# Patient Record
Sex: MALE | Race: OTHER | Employment: UNEMPLOYED | URBAN - METROPOLITAN AREA
[De-identification: names, ages, dates, MRNs, and addresses within clinical notes are randomized per-mention and may not be internally consistent; named-entity substitution may affect disease eponyms.]

---

## 2018-04-11 ENCOUNTER — OFFICE VISIT (OUTPATIENT)
Dept: PRIMARY CARE CLINIC | Age: 58
End: 2018-04-11
Payer: COMMERCIAL

## 2018-04-11 VITALS
DIASTOLIC BLOOD PRESSURE: 80 MMHG | OXYGEN SATURATION: 95 % | WEIGHT: 268 LBS | SYSTOLIC BLOOD PRESSURE: 130 MMHG | HEIGHT: 70 IN | RESPIRATION RATE: 16 BRPM | TEMPERATURE: 98 F | BODY MASS INDEX: 38.37 KG/M2 | HEART RATE: 77 BPM

## 2018-04-11 DIAGNOSIS — E78.5 DYSLIPIDEMIA: ICD-10-CM

## 2018-04-11 DIAGNOSIS — Z12.11 COLON CANCER SCREENING: ICD-10-CM

## 2018-04-11 DIAGNOSIS — G47.33 OSA (OBSTRUCTIVE SLEEP APNEA): ICD-10-CM

## 2018-04-11 DIAGNOSIS — K21.9 GASTROESOPHAGEAL REFLUX DISEASE WITHOUT ESOPHAGITIS: ICD-10-CM

## 2018-04-11 DIAGNOSIS — E03.9 HYPOTHYROIDISM, UNSPECIFIED TYPE: ICD-10-CM

## 2018-04-11 DIAGNOSIS — E66.09 EXOGENOUS OBESITY: ICD-10-CM

## 2018-04-11 DIAGNOSIS — F41.1 GAD (GENERALIZED ANXIETY DISORDER): Primary | ICD-10-CM

## 2018-04-11 DIAGNOSIS — Z00.00 ANNUAL PHYSICAL EXAM: ICD-10-CM

## 2018-04-11 LAB
ALBUMIN SERPL-MCNC: 4.9 G/DL (ref 3.9–4.9)
ALP BLD-CCNC: 70 U/L (ref 35–104)
ALT SERPL-CCNC: 32 U/L (ref 0–41)
ANION GAP SERPL CALCULATED.3IONS-SCNC: 19 MEQ/L (ref 7–13)
AST SERPL-CCNC: 34 U/L (ref 0–40)
ATYPICAL LYMPHOCYTE RELATIVE PERCENT: 7 %
BANDED NEUTROPHILS RELATIVE PERCENT: 1 %
BASOPHILS ABSOLUTE: 0.1 K/UL (ref 0–0.2)
BASOPHILS RELATIVE PERCENT: 1 %
BILIRUB SERPL-MCNC: 0.7 MG/DL (ref 0–1.2)
BILIRUBIN, POC: ABNORMAL
BLOOD URINE, POC: ABNORMAL
BUN BLDV-MCNC: 15 MG/DL (ref 6–20)
CALCIUM SERPL-MCNC: 9.5 MG/DL (ref 8.6–10.2)
CHLORIDE BLD-SCNC: 97 MEQ/L (ref 98–107)
CHOLESTEROL, TOTAL: 202 MG/DL (ref 0–199)
CLARITY, POC: CLEAR
CO2: 25 MEQ/L (ref 22–29)
COLOR, POC: YELLOW
CREAT SERPL-MCNC: 1.06 MG/DL (ref 0.7–1.2)
EOSINOPHILS ABSOLUTE: 0.2 K/UL (ref 0–0.7)
EOSINOPHILS RELATIVE PERCENT: 2 %
GFR AFRICAN AMERICAN: >60
GFR NON-AFRICAN AMERICAN: >60
GLOBULIN: 2.9 G/DL (ref 2.3–3.5)
GLUCOSE BLD-MCNC: 86 MG/DL (ref 74–109)
GLUCOSE URINE, POC: ABNORMAL
HCT VFR BLD CALC: 44.7 % (ref 42–52)
HDLC SERPL-MCNC: 79 MG/DL (ref 40–59)
HEMOGLOBIN: 15.3 G/DL (ref 14–18)
KETONES, POC: ABNORMAL
LDL CHOLESTEROL CALCULATED: 105 MG/DL (ref 0–129)
LEUKOCYTE EST, POC: ABNORMAL
LYMPHOCYTES ABSOLUTE: 2.2 K/UL (ref 1–4.8)
LYMPHOCYTES RELATIVE PERCENT: 21 %
MCH RBC QN AUTO: 31.8 PG (ref 27–31.3)
MCHC RBC AUTO-ENTMCNC: 34.3 % (ref 33–37)
MCV RBC AUTO: 92.9 FL (ref 80–100)
METAMYELOCYTES RELATIVE PERCENT: 1 %
MONOCYTES ABSOLUTE: 0.5 K/UL (ref 0.2–0.8)
MONOCYTES RELATIVE PERCENT: 7 %
NEUTROPHILS ABSOLUTE: 4.9 K/UL (ref 1.4–6.5)
NEUTROPHILS RELATIVE PERCENT: 61 %
NITRITE, POC: ABNORMAL
PDW BLD-RTO: 13.4 % (ref 11.5–14.5)
PH, POC: 6
PLATELET # BLD: 257 K/UL (ref 130–400)
PLATELET SLIDE REVIEW: ADEQUATE
POTASSIUM SERPL-SCNC: 4.1 MEQ/L (ref 3.5–5.1)
PROTEIN, POC: ABNORMAL
RBC # BLD: 4.82 M/UL (ref 4.7–6.1)
RBC # BLD: NORMAL 10*6/UL
SMUDGE CELLS: 2.6
SODIUM BLD-SCNC: 141 MEQ/L (ref 132–144)
SPECIFIC GRAVITY, POC: 1.02
TOTAL PROTEIN: 7.8 G/DL (ref 6.4–8.1)
TRIGL SERPL-MCNC: 88 MG/DL (ref 0–200)
TSH SERPL DL<=0.05 MIU/L-ACNC: 2.43 UIU/ML (ref 0.27–4.2)
UROBILINOGEN, POC: ABNORMAL
WBC # BLD: 7.8 K/UL (ref 4.8–10.8)

## 2018-04-11 PROCEDURE — G8417 CALC BMI ABV UP PARAM F/U: HCPCS | Performed by: FAMILY MEDICINE

## 2018-04-11 PROCEDURE — 3017F COLORECTAL CA SCREEN DOC REV: CPT | Performed by: FAMILY MEDICINE

## 2018-04-11 PROCEDURE — G8427 DOCREV CUR MEDS BY ELIG CLIN: HCPCS | Performed by: FAMILY MEDICINE

## 2018-04-11 PROCEDURE — 99203 OFFICE O/P NEW LOW 30 MIN: CPT | Performed by: FAMILY MEDICINE

## 2018-04-11 PROCEDURE — 81003 URINALYSIS AUTO W/O SCOPE: CPT | Performed by: FAMILY MEDICINE

## 2018-04-11 PROCEDURE — 1036F TOBACCO NON-USER: CPT | Performed by: FAMILY MEDICINE

## 2018-04-11 RX ORDER — OMEPRAZOLE 20 MG/1
20 CAPSULE, DELAYED RELEASE ORAL 2 TIMES DAILY
Qty: 30 CAPSULE | Refills: 3 | Status: SHIPPED | OUTPATIENT
Start: 2018-04-11 | End: 2018-06-07 | Stop reason: SDUPTHER

## 2018-04-11 RX ORDER — PHENTERMINE HYDROCHLORIDE 37.5 MG/1
37.5 TABLET ORAL
Qty: 30 TABLET | Refills: 0 | Status: SHIPPED | OUTPATIENT
Start: 2018-04-11 | End: 2018-05-10 | Stop reason: SDUPTHER

## 2018-04-11 ASSESSMENT — ENCOUNTER SYMPTOMS
NAUSEA: 0
CONSTIPATION: 0
PHOTOPHOBIA: 0
STRIDOR: 0
CHOKING: 0
DIARRHEA: 0
CHEST TIGHTNESS: 0
APNEA: 0
WHEEZING: 0
ABDOMINAL PAIN: 0
EYE PAIN: 0
EYE REDNESS: 0
COLOR CHANGE: 0
EYE DISCHARGE: 0
FACIAL SWELLING: 0

## 2018-04-11 ASSESSMENT — PATIENT HEALTH QUESTIONNAIRE - PHQ9
2. FEELING DOWN, DEPRESSED OR HOPELESS: 0
SUM OF ALL RESPONSES TO PHQ QUESTIONS 1-9: 0
SUM OF ALL RESPONSES TO PHQ9 QUESTIONS 1 & 2: 0
1. LITTLE INTEREST OR PLEASURE IN DOING THINGS: 0

## 2018-04-20 ENCOUNTER — HOSPITAL ENCOUNTER (OUTPATIENT)
Dept: SLEEP CENTER | Age: 58
Discharge: HOME | End: 2018-04-22
Payer: COMMERCIAL

## 2018-04-20 PROCEDURE — 95810 POLYSOM 6/> YRS 4/> PARAM: CPT

## 2018-04-26 ENCOUNTER — OFFICE VISIT (OUTPATIENT)
Dept: BEHAVIORAL/MENTAL HEALTH CLINIC | Age: 58
End: 2018-04-26
Payer: COMMERCIAL

## 2018-04-26 DIAGNOSIS — F19.21 POLYSUBSTANCE DEPENDENCE IN EARLY, EARLY PARTIAL, SUSTAINED FULL, OR SUSTAINED PARTIAL REMISSION (HCC): ICD-10-CM

## 2018-04-26 DIAGNOSIS — F41.9 ANXIETY: ICD-10-CM

## 2018-04-26 PROCEDURE — 90791 PSYCH DIAGNOSTIC EVALUATION: CPT | Performed by: PSYCHOLOGIST

## 2018-04-26 ASSESSMENT — PATIENT HEALTH QUESTIONNAIRE - PHQ9
4. FEELING TIRED OR HAVING LITTLE ENERGY: 1
3. TROUBLE FALLING OR STAYING ASLEEP: 3
8. MOVING OR SPEAKING SO SLOWLY THAT OTHER PEOPLE COULD HAVE NOTICED. OR THE OPPOSITE, BEING SO FIGETY OR RESTLESS THAT YOU HAVE BEEN MOVING AROUND A LOT MORE THAN USUAL: 3
1. LITTLE INTEREST OR PLEASURE IN DOING THINGS: 0
5. POOR APPETITE OR OVEREATING: 3
SUM OF ALL RESPONSES TO PHQ9 QUESTIONS 1 & 2: 0
7. TROUBLE CONCENTRATING ON THINGS, SUCH AS READING THE NEWSPAPER OR WATCHING TELEVISION: 3
10. IF YOU CHECKED OFF ANY PROBLEMS, HOW DIFFICULT HAVE THESE PROBLEMS MADE IT FOR YOU TO DO YOUR WORK, TAKE CARE OF THINGS AT HOME, OR GET ALONG WITH OTHER PEOPLE: 2
9. THOUGHTS THAT YOU WOULD BE BETTER OFF DEAD, OR OF HURTING YOURSELF: 0
6. FEELING BAD ABOUT YOURSELF - OR THAT YOU ARE A FAILURE OR HAVE LET YOURSELF OR YOUR FAMILY DOWN: 1
SUM OF ALL RESPONSES TO PHQ QUESTIONS 1-9: 14
2. FEELING DOWN, DEPRESSED OR HOPELESS: 0

## 2018-05-04 ENCOUNTER — TELEPHONE (OUTPATIENT)
Dept: PULMONOLOGY | Age: 58
End: 2018-05-04

## 2018-05-10 ENCOUNTER — OFFICE VISIT (OUTPATIENT)
Dept: PRIMARY CARE CLINIC | Age: 58
End: 2018-05-10
Payer: COMMERCIAL

## 2018-05-10 VITALS
OXYGEN SATURATION: 95 % | TEMPERATURE: 96.3 F | RESPIRATION RATE: 16 BRPM | HEIGHT: 70 IN | BODY MASS INDEX: 35.93 KG/M2 | SYSTOLIC BLOOD PRESSURE: 104 MMHG | HEART RATE: 94 BPM | WEIGHT: 251 LBS | DIASTOLIC BLOOD PRESSURE: 76 MMHG

## 2018-05-10 DIAGNOSIS — F41.9 ANXIETY: Primary | ICD-10-CM

## 2018-05-10 DIAGNOSIS — E66.09 EXOGENOUS OBESITY: ICD-10-CM

## 2018-05-10 DIAGNOSIS — G47.33 OSA (OBSTRUCTIVE SLEEP APNEA): ICD-10-CM

## 2018-05-10 PROCEDURE — 1036F TOBACCO NON-USER: CPT | Performed by: FAMILY MEDICINE

## 2018-05-10 PROCEDURE — G8427 DOCREV CUR MEDS BY ELIG CLIN: HCPCS | Performed by: FAMILY MEDICINE

## 2018-05-10 PROCEDURE — 99213 OFFICE O/P EST LOW 20 MIN: CPT | Performed by: FAMILY MEDICINE

## 2018-05-10 PROCEDURE — G8417 CALC BMI ABV UP PARAM F/U: HCPCS | Performed by: FAMILY MEDICINE

## 2018-05-10 PROCEDURE — 3017F COLORECTAL CA SCREEN DOC REV: CPT | Performed by: FAMILY MEDICINE

## 2018-05-10 RX ORDER — PHENTERMINE HYDROCHLORIDE 37.5 MG/1
37.5 TABLET ORAL
Qty: 30 TABLET | Refills: 0 | Status: SHIPPED | OUTPATIENT
Start: 2018-05-10 | End: 2018-06-07 | Stop reason: SDUPTHER

## 2018-05-10 ASSESSMENT — ENCOUNTER SYMPTOMS
CHOKING: 0
FACIAL SWELLING: 0
CHEST TIGHTNESS: 0
COLOR CHANGE: 0
DIARRHEA: 0
STRIDOR: 0
EYE DISCHARGE: 0
WHEEZING: 0
EYE PAIN: 0
CONSTIPATION: 0
ABDOMINAL PAIN: 0
NAUSEA: 0
PHOTOPHOBIA: 0
EYE REDNESS: 0
APNEA: 0

## 2018-06-07 ENCOUNTER — OFFICE VISIT (OUTPATIENT)
Dept: PRIMARY CARE CLINIC | Age: 58
End: 2018-06-07
Payer: COMMERCIAL

## 2018-06-07 VITALS
TEMPERATURE: 98.2 F | HEIGHT: 70 IN | WEIGHT: 242 LBS | HEART RATE: 111 BPM | OXYGEN SATURATION: 97 % | BODY MASS INDEX: 34.65 KG/M2 | SYSTOLIC BLOOD PRESSURE: 108 MMHG | RESPIRATION RATE: 16 BRPM | DIASTOLIC BLOOD PRESSURE: 68 MMHG

## 2018-06-07 DIAGNOSIS — K21.9 GASTROESOPHAGEAL REFLUX DISEASE WITHOUT ESOPHAGITIS: ICD-10-CM

## 2018-06-07 DIAGNOSIS — E66.09 EXOGENOUS OBESITY: ICD-10-CM

## 2018-06-07 DIAGNOSIS — F41.9 ANXIETY: Primary | ICD-10-CM

## 2018-06-07 PROCEDURE — G8417 CALC BMI ABV UP PARAM F/U: HCPCS | Performed by: FAMILY MEDICINE

## 2018-06-07 PROCEDURE — 3017F COLORECTAL CA SCREEN DOC REV: CPT | Performed by: FAMILY MEDICINE

## 2018-06-07 PROCEDURE — G8427 DOCREV CUR MEDS BY ELIG CLIN: HCPCS | Performed by: FAMILY MEDICINE

## 2018-06-07 PROCEDURE — 99213 OFFICE O/P EST LOW 20 MIN: CPT | Performed by: FAMILY MEDICINE

## 2018-06-07 PROCEDURE — 1036F TOBACCO NON-USER: CPT | Performed by: FAMILY MEDICINE

## 2018-06-07 RX ORDER — PHENTERMINE HYDROCHLORIDE 37.5 MG/1
37.5 TABLET ORAL
Qty: 30 TABLET | Refills: 0 | Status: SHIPPED | OUTPATIENT
Start: 2018-06-07 | End: 2018-07-07

## 2018-06-07 RX ORDER — OMEPRAZOLE 20 MG/1
20 CAPSULE, DELAYED RELEASE ORAL 2 TIMES DAILY
Qty: 30 CAPSULE | Refills: 3 | Status: SHIPPED | OUTPATIENT
Start: 2018-06-07

## 2018-06-07 ASSESSMENT — ENCOUNTER SYMPTOMS
PHOTOPHOBIA: 0
EYES NEGATIVE: 1
RESPIRATORY NEGATIVE: 1
ABDOMINAL PAIN: 0
CHEST TIGHTNESS: 0
CONSTIPATION: 0
APNEA: 0
SHORTNESS OF BREATH: 0
NAUSEA: 0
DIARRHEA: 0
VOMITING: 0
BACK PAIN: 0
COUGH: 0
BLOOD IN STOOL: 0
EYE DISCHARGE: 0

## 2018-06-08 ENCOUNTER — TELEPHONE (OUTPATIENT)
Dept: PRIMARY CARE CLINIC | Age: 58
End: 2018-06-08

## 2018-06-14 ASSESSMENT — ENCOUNTER SYMPTOMS: ABDOMINAL DISTENTION: 1

## 2019-02-09 ENCOUNTER — APPOINTMENT (OUTPATIENT)
Dept: GENERAL RADIOLOGY | Age: 59
End: 2019-02-09
Payer: COMMERCIAL

## 2019-02-09 ENCOUNTER — HOSPITAL ENCOUNTER (EMERGENCY)
Age: 59
Discharge: HOME | End: 2019-02-09
Payer: COMMERCIAL

## 2019-02-09 VITALS
RESPIRATION RATE: 16 BRPM | HEART RATE: 98 BPM | OXYGEN SATURATION: 99 % | BODY MASS INDEX: 33.64 KG/M2 | WEIGHT: 235 LBS | TEMPERATURE: 97.8 F | HEIGHT: 70 IN | DIASTOLIC BLOOD PRESSURE: 77 MMHG | SYSTOLIC BLOOD PRESSURE: 135 MMHG

## 2019-02-09 DIAGNOSIS — S82.832A CLOSED FRACTURE OF DISTAL END OF LEFT FIBULA, UNSPECIFIED FRACTURE MORPHOLOGY, INITIAL ENCOUNTER: Primary | ICD-10-CM

## 2019-02-09 PROCEDURE — 29515 APPLICATION SHORT LEG SPLINT: CPT

## 2019-02-09 PROCEDURE — 99283 EMERGENCY DEPT VISIT LOW MDM: CPT

## 2019-02-09 PROCEDURE — 73610 X-RAY EXAM OF ANKLE: CPT

## 2019-02-09 RX ORDER — HYDROCODONE BITARTRATE AND ACETAMINOPHEN 5; 325 MG/1; MG/1
1 TABLET ORAL EVERY 6 HOURS PRN
Qty: 10 TABLET | Refills: 0 | Status: SHIPPED | OUTPATIENT
Start: 2019-02-09 | End: 2019-02-12

## 2019-02-09 ASSESSMENT — PAIN DESCRIPTION - LOCATION: LOCATION: ANKLE

## 2019-02-09 ASSESSMENT — ENCOUNTER SYMPTOMS
BACK PAIN: 0
ABDOMINAL PAIN: 0
COUGH: 0
SHORTNESS OF BREATH: 0

## 2019-02-09 ASSESSMENT — PAIN DESCRIPTION - DESCRIPTORS: DESCRIPTORS: ACHING

## 2019-02-09 ASSESSMENT — PAIN SCALES - GENERAL: PAINLEVEL_OUTOF10: 5

## 2019-02-09 ASSESSMENT — PAIN DESCRIPTION - PAIN TYPE: TYPE: ACUTE PAIN

## 2019-02-25 ENCOUNTER — OFFICE VISIT (OUTPATIENT)
Dept: PRIMARY CARE CLINIC | Age: 59
End: 2019-02-25
Payer: COMMERCIAL

## 2019-02-25 VITALS
DIASTOLIC BLOOD PRESSURE: 78 MMHG | HEART RATE: 86 BPM | WEIGHT: 235 LBS | OXYGEN SATURATION: 97 % | HEIGHT: 70 IN | SYSTOLIC BLOOD PRESSURE: 128 MMHG | BODY MASS INDEX: 33.64 KG/M2 | RESPIRATION RATE: 14 BRPM | TEMPERATURE: 98.2 F

## 2019-02-25 DIAGNOSIS — E66.09 EXOGENOUS OBESITY: ICD-10-CM

## 2019-02-25 DIAGNOSIS — F19.21 POLYSUBSTANCE DEPENDENCE IN EARLY, EARLY PARTIAL, SUSTAINED FULL, OR SUSTAINED PARTIAL REMISSION (HCC): ICD-10-CM

## 2019-02-25 DIAGNOSIS — F32.9 REACTIVE DEPRESSION: Primary | ICD-10-CM

## 2019-02-25 DIAGNOSIS — S82.842G CLOSED BIMALLEOLAR FRACTURE OF LEFT ANKLE WITH DELAYED HEALING, SUBSEQUENT ENCOUNTER: ICD-10-CM

## 2019-02-25 PROCEDURE — 3017F COLORECTAL CA SCREEN DOC REV: CPT | Performed by: FAMILY MEDICINE

## 2019-02-25 PROCEDURE — 99213 OFFICE O/P EST LOW 20 MIN: CPT | Performed by: FAMILY MEDICINE

## 2019-02-25 PROCEDURE — G8427 DOCREV CUR MEDS BY ELIG CLIN: HCPCS | Performed by: FAMILY MEDICINE

## 2019-02-25 PROCEDURE — G8417 CALC BMI ABV UP PARAM F/U: HCPCS | Performed by: FAMILY MEDICINE

## 2019-02-25 PROCEDURE — G8484 FLU IMMUNIZE NO ADMIN: HCPCS | Performed by: FAMILY MEDICINE

## 2019-02-25 PROCEDURE — 1036F TOBACCO NON-USER: CPT | Performed by: FAMILY MEDICINE

## 2019-02-25 RX ORDER — NAPROXEN 375 MG/1
375 TABLET ORAL 2 TIMES DAILY WITH MEALS
Qty: 60 TABLET | Refills: 0 | Status: SHIPPED | OUTPATIENT
Start: 2019-02-25

## 2019-02-25 RX ORDER — PHENTERMINE HYDROCHLORIDE 37.5 MG/1
37.5 TABLET ORAL
Qty: 30 TABLET | Refills: 0 | Status: SHIPPED | OUTPATIENT
Start: 2019-02-25 | End: 2019-03-27

## 2019-02-25 ASSESSMENT — ENCOUNTER SYMPTOMS
RESPIRATORY NEGATIVE: 1
EYES NEGATIVE: 1
CONSTIPATION: 0
DIARRHEA: 0
VOMITING: 0
APNEA: 0
PHOTOPHOBIA: 0
GASTROINTESTINAL NEGATIVE: 1
BLOOD IN STOOL: 0
SHORTNESS OF BREATH: 0
COUGH: 0
ABDOMINAL PAIN: 0
CHEST TIGHTNESS: 0
BACK PAIN: 0
NAUSEA: 0
EYE DISCHARGE: 0

## 2019-03-06 ENCOUNTER — HOSPITAL ENCOUNTER (EMERGENCY)
Age: 59
Discharge: HOME | End: 2019-03-06
Attending: EMERGENCY MEDICINE
Payer: COMMERCIAL

## 2019-03-06 VITALS
WEIGHT: 235 LBS | DIASTOLIC BLOOD PRESSURE: 77 MMHG | HEART RATE: 86 BPM | TEMPERATURE: 98.4 F | SYSTOLIC BLOOD PRESSURE: 160 MMHG | OXYGEN SATURATION: 98 % | BODY MASS INDEX: 33.64 KG/M2 | HEIGHT: 70 IN | RESPIRATION RATE: 18 BRPM

## 2019-03-06 DIAGNOSIS — L50.9 URTICARIA: Primary | ICD-10-CM

## 2019-03-06 PROCEDURE — 96374 THER/PROPH/DIAG INJ IV PUSH: CPT

## 2019-03-06 PROCEDURE — 2580000003 HC RX 258: Performed by: EMERGENCY MEDICINE

## 2019-03-06 PROCEDURE — 6360000002 HC RX W HCPCS: Performed by: EMERGENCY MEDICINE

## 2019-03-06 PROCEDURE — 96375 TX/PRO/DX INJ NEW DRUG ADDON: CPT

## 2019-03-06 PROCEDURE — 99282 EMERGENCY DEPT VISIT SF MDM: CPT

## 2019-03-06 PROCEDURE — 2500000003 HC RX 250 WO HCPCS: Performed by: EMERGENCY MEDICINE

## 2019-03-06 RX ORDER — 0.9 % SODIUM CHLORIDE 0.9 %
500 INTRAVENOUS SOLUTION INTRAVENOUS ONCE
Status: COMPLETED | OUTPATIENT
Start: 2019-03-06 | End: 2019-03-06

## 2019-03-06 RX ORDER — PREDNISONE 20 MG/1
40 TABLET ORAL DAILY
Qty: 10 TABLET | Refills: 0 | Status: SHIPPED | OUTPATIENT
Start: 2019-03-06 | End: 2019-03-11

## 2019-03-06 RX ORDER — METHYLPREDNISOLONE SODIUM SUCCINATE 125 MG/2ML
125 INJECTION, POWDER, LYOPHILIZED, FOR SOLUTION INTRAMUSCULAR; INTRAVENOUS ONCE
Status: COMPLETED | OUTPATIENT
Start: 2019-03-06 | End: 2019-03-06

## 2019-03-06 RX ADMIN — SODIUM CHLORIDE 500 ML: 9 INJECTION, SOLUTION INTRAVENOUS at 04:50

## 2019-03-06 RX ADMIN — FAMOTIDINE 20 MG: 10 INJECTION, SOLUTION INTRAVENOUS at 04:50

## 2019-03-06 RX ADMIN — METHYLPREDNISOLONE SODIUM SUCCINATE 125 MG: 125 INJECTION, POWDER, FOR SOLUTION INTRAMUSCULAR; INTRAVENOUS at 04:50

## 2019-03-06 ASSESSMENT — ENCOUNTER SYMPTOMS
SHORTNESS OF BREATH: 0
ABDOMINAL PAIN: 0
CHEST TIGHTNESS: 0
VOMITING: 0
EYE DISCHARGE: 0
PHOTOPHOBIA: 0
SORE THROAT: 0
COUGH: 0
WHEEZING: 0
ABDOMINAL DISTENTION: 0

## 2019-04-10 ENCOUNTER — TELEPHONE (OUTPATIENT)
Dept: FAMILY MEDICINE CLINIC | Age: 59
End: 2019-04-10

## 2023-08-24 ENCOUNTER — OFFICE VISIT (OUTPATIENT)
Dept: PRIMARY CARE | Facility: CLINIC | Age: 63
End: 2023-08-24
Payer: COMMERCIAL

## 2023-08-24 VITALS
BODY MASS INDEX: 43.84 KG/M2 | OXYGEN SATURATION: 92 % | HEART RATE: 87 BPM | SYSTOLIC BLOOD PRESSURE: 130 MMHG | DIASTOLIC BLOOD PRESSURE: 66 MMHG | HEIGHT: 69 IN | WEIGHT: 296 LBS

## 2023-08-24 DIAGNOSIS — Z83.3 FAMILY HISTORY OF DIABETES MELLITUS: ICD-10-CM

## 2023-08-24 DIAGNOSIS — B35.1 ONYCHOMYCOSIS: ICD-10-CM

## 2023-08-24 DIAGNOSIS — R06.83 SNORING: ICD-10-CM

## 2023-08-24 DIAGNOSIS — F17.211 CIGARETTE NICOTINE DEPENDENCE IN REMISSION: ICD-10-CM

## 2023-08-24 DIAGNOSIS — E66.01 MORBID OBESITY (MULTI): ICD-10-CM

## 2023-08-24 DIAGNOSIS — G89.29 CHRONIC PAIN OF RIGHT KNEE: ICD-10-CM

## 2023-08-24 DIAGNOSIS — G47.19 EXCESSIVE DAYTIME SLEEPINESS: ICD-10-CM

## 2023-08-24 DIAGNOSIS — Z12.5 ENCOUNTER FOR PROSTATE CANCER SCREENING: ICD-10-CM

## 2023-08-24 DIAGNOSIS — R31.9 HEMATURIA, UNSPECIFIED TYPE: ICD-10-CM

## 2023-08-24 DIAGNOSIS — R39.198 DIFFICULTY URINATING: ICD-10-CM

## 2023-08-24 DIAGNOSIS — R73.9 HYPERGLYCEMIA: Primary | ICD-10-CM

## 2023-08-24 DIAGNOSIS — M25.561 CHRONIC PAIN OF RIGHT KNEE: ICD-10-CM

## 2023-08-24 DIAGNOSIS — R39.15 URINARY URGENCY: ICD-10-CM

## 2023-08-24 DIAGNOSIS — E78.5 DYSLIPIDEMIA: ICD-10-CM

## 2023-08-24 DIAGNOSIS — R06.81 APNEIC EPISODE: ICD-10-CM

## 2023-08-24 DIAGNOSIS — F10.10 ETOH ABUSE: ICD-10-CM

## 2023-08-24 PROBLEM — M54.16 LUMBAR RADICULOPATHY: Status: ACTIVE | Noted: 2023-08-24

## 2023-08-24 PROBLEM — K21.9 ACID REFLUX: Status: ACTIVE | Noted: 2023-08-24

## 2023-08-24 PROBLEM — E55.9 VITAMIN D DEFICIENCY: Status: ACTIVE | Noted: 2023-08-24

## 2023-08-24 LAB
POC APPEARANCE, URINE: ABNORMAL
POC BILIRUBIN, URINE: NEGATIVE
POC BLOOD, URINE: ABNORMAL
POC COLOR, URINE: YELLOW
POC FINGERSTICK BLOOD GLUCOSE: 107 MG/DL (ref 70–100)
POC GLUCOSE, URINE: NEGATIVE MG/DL
POC HEMOGLOBIN A1C: 6.1 % (ref 4.2–6.5)
POC KETONES, URINE: NEGATIVE MG/DL
POC LEUKOCYTES, URINE: NEGATIVE
POC NITRITE,URINE: NEGATIVE
POC PH, URINE: 5.5 PH
POC PROTEIN, URINE: NEGATIVE MG/DL
POC SPECIFIC GRAVITY, URINE: 1.02
POC UROBILINOGEN, URINE: 1 EU/DL

## 2023-08-24 PROCEDURE — 87086 URINE CULTURE/COLONY COUNT: CPT

## 2023-08-24 PROCEDURE — 99215 OFFICE O/P EST HI 40 MIN: CPT | Performed by: FAMILY MEDICINE

## 2023-08-24 PROCEDURE — 81002 URINALYSIS NONAUTO W/O SCOPE: CPT | Performed by: FAMILY MEDICINE

## 2023-08-24 PROCEDURE — 83036 HEMOGLOBIN GLYCOSYLATED A1C: CPT | Performed by: FAMILY MEDICINE

## 2023-08-24 PROCEDURE — 82962 GLUCOSE BLOOD TEST: CPT | Performed by: FAMILY MEDICINE

## 2023-08-24 PROCEDURE — 4004F PT TOBACCO SCREEN RCVD TLK: CPT | Performed by: FAMILY MEDICINE

## 2023-08-24 PROCEDURE — 3008F BODY MASS INDEX DOCD: CPT | Performed by: FAMILY MEDICINE

## 2023-08-24 RX ORDER — BUPROPION HYDROCHLORIDE 150 MG/1
150 TABLET, EXTENDED RELEASE ORAL 2 TIMES DAILY
Qty: 60 TABLET | Refills: 5 | Status: SHIPPED | OUTPATIENT
Start: 2023-08-24 | End: 2023-11-13 | Stop reason: SDUPTHER

## 2023-08-24 RX ORDER — TAMSULOSIN HYDROCHLORIDE 0.4 MG/1
0.4 CAPSULE ORAL DAILY
Qty: 30 CAPSULE | Refills: 2 | Status: SHIPPED | OUTPATIENT
Start: 2023-08-24 | End: 2023-11-13 | Stop reason: SDUPTHER

## 2023-08-24 RX ORDER — SEMAGLUTIDE 0.25 MG/.5ML
0.25 INJECTION, SOLUTION SUBCUTANEOUS
Qty: 2 ML | Refills: 0 | Status: SHIPPED | OUTPATIENT
Start: 2023-08-24 | End: 2023-10-05

## 2023-08-24 ASSESSMENT — ENCOUNTER SYMPTOMS
FEVER: 0
ABDOMINAL PAIN: 0
CONSTIPATION: 0
ABDOMINAL DISTENTION: 0
STRIDOR: 0
NECK STIFFNESS: 0
DIZZINESS: 0
FATIGUE: 0
FLANK PAIN: 0
DIFFICULTY URINATING: 1
SLEEP DISTURBANCE: 0
CHEST TIGHTNESS: 0
HEMATURIA: 0
NERVOUS/ANXIOUS: 0
DIARRHEA: 0
EYE PAIN: 0
AGITATION: 0
COUGH: 0
TROUBLE SWALLOWING: 0
PALPITATIONS: 0
SPEECH DIFFICULTY: 0
POLYPHAGIA: 0
HEADACHES: 0
APPETITE CHANGE: 0
SHORTNESS OF BREATH: 0
SEIZURES: 0
PHOTOPHOBIA: 0
SINUS PAIN: 0
RECTAL PAIN: 0
ADENOPATHY: 0
SORE THROAT: 0
BLOOD IN STOOL: 0
MYALGIAS: 0
ACTIVITY CHANGE: 0
RHINORRHEA: 0
ARTHRALGIAS: 1
POLYDIPSIA: 0
DECREASED CONCENTRATION: 0
DYSPHORIC MOOD: 0
COLOR CHANGE: 0
DYSURIA: 0
SINUS PRESSURE: 0
CONFUSION: 0

## 2023-08-24 NOTE — PROGRESS NOTES
Subjective   Patient ID: Jaycee Harp is a 63 y.o. male who presents for Obesity, Leg Swelling, Nicotine Dependence, Nail Problem, and Urinary Frequency.    HPI   Patient presents today with c/o Bilateral leg swelling. Patient states that this has been an issue for about 1 year.  Patient states that he does drink alcohol and when he does his legs swell severely and will have drainage from his legs. Patient also admits to heaviness in his legs when they are swelled.     Patient would like to have his prostate checked. Patient states he has frequent urination. Patient states that he has times where he is urinating and feel like he is done and the urination continues. Patient state she does not have any symptoms of urinary tract infection.     Patient would like toe nail examined. Patient states he has a toe nail fungus.     Patient would like to discuss options to help him stop smoking.     Patient would like to discuss weight loss options today.    Patient also states that he has times where he zones out for a few minuets.     Family history diabetes     Has a hard time sleeping at night, feels like he can not breath. Oxygen level today 92%    Patient c/o right knee issues. States he fell about 2 years ago and went down on his knee.    Review of Systems   Constitutional:  Negative for activity change, appetite change, fatigue and fever.   HENT:  Negative for congestion, dental problem, ear discharge, ear pain, mouth sores, rhinorrhea, sinus pressure, sinus pain, sore throat, tinnitus and trouble swallowing.    Eyes:  Negative for photophobia, pain and visual disturbance.   Respiratory:  Negative for cough, chest tightness, shortness of breath and stridor.    Cardiovascular:  Positive for leg swelling. Negative for chest pain and palpitations.   Gastrointestinal:  Negative for abdominal distention, abdominal pain, blood in stool, constipation, diarrhea and rectal pain.   Endocrine: Negative for cold intolerance, heat  "intolerance, polydipsia, polyphagia and polyuria.   Genitourinary:  Positive for difficulty urinating and urgency. Negative for dysuria, flank pain and hematuria.   Musculoskeletal:  Positive for arthralgias. Negative for gait problem, myalgias and neck stiffness.   Skin:  Negative for color change and rash.   Allergic/Immunologic: Negative for environmental allergies and food allergies.   Neurological:  Negative for dizziness, seizures, syncope, speech difficulty and headaches.   Hematological:  Negative for adenopathy.   Psychiatric/Behavioral:  Negative for agitation, confusion, decreased concentration, dysphoric mood and sleep disturbance. The patient is not nervous/anxious.        Objective   /66   Pulse 87   Ht 1.753 m (5' 9\")   Wt 134 kg (296 lb)   SpO2 92%   BMI 43.71 kg/m²     Physical Exam  Vitals reviewed.   Constitutional:       General: He is not in acute distress.     Appearance: He is obese. He is ill-appearing. He is not diaphoretic.   HENT:      Head: Normocephalic.      Right Ear: Tympanic membrane and external ear normal.      Left Ear: Tympanic membrane and external ear normal.      Nose: Nose normal. No congestion.      Mouth/Throat:      Mouth: Mucous membranes are dry.      Pharynx: No posterior oropharyngeal erythema.   Eyes:      General:         Right eye: No discharge.         Left eye: No discharge.      Extraocular Movements: Extraocular movements intact.      Conjunctiva/sclera: Conjunctivae normal.      Pupils: Pupils are equal, round, and reactive to light.   Cardiovascular:      Rate and Rhythm: Normal rate and regular rhythm.      Pulses: Normal pulses.      Heart sounds: Normal heart sounds. No murmur heard.  Pulmonary:      Effort: Pulmonary effort is normal. No respiratory distress.      Breath sounds: Normal breath sounds. No wheezing or rales.   Chest:      Chest wall: No tenderness.   Abdominal:      General: Bowel sounds are normal. There is distension.      " Palpations: There is no mass.      Tenderness: There is no abdominal tenderness. There is no guarding.   Musculoskeletal:         General: No tenderness. Normal range of motion.      Cervical back: Normal range of motion and neck supple. No tenderness.      Right lower leg: No edema.      Left lower leg: No edema.   Skin:     General: Skin is warm and dry.      Coloration: Skin is not jaundiced.      Findings: No bruising or erythema.   Neurological:      General: No focal deficit present.      Mental Status: He is alert and oriented to person, place, and time. Mental status is at baseline.      Cranial Nerves: No cranial nerve deficit.      Sensory: No sensory deficit.      Coordination: Coordination normal.      Gait: Gait normal.   Psychiatric:         Mood and Affect: Mood normal.         Thought Content: Thought content normal.         Judgment: Judgment normal.         Assessment/Plan   Problem List Items Addressed This Visit       Family history of diabetes mellitus    Relevant Medications    semaglutide, weight loss, (Wegovy) 0.25 mg/0.5 mL pen injector    Other Relevant Orders    POCT Fingerstick Glucose manually resulted (Completed)    POCT glycosylated hemoglobin (Hb A1C) manually resulted (Completed)    Referral to Nutrition Services    Dyslipidemia    Relevant Orders    Comprehensive Metabolic Panel    Lipid Panel    CBC and Auto Differential    Hyperglycemia - Primary    Relevant Medications    semaglutide, weight loss, (Wegovy) 0.25 mg/0.5 mL pen injector    semaglutide 0.25 mg or 0.5 mg (2 mg/3 mL) pen injector    Other Relevant Orders    Referral to Nutrition Services    Thyroid Stimulating Hormone    Knee pain, right    Morbid obesity (CMS/HCC)    Relevant Medications    semaglutide, weight loss, (Wegovy) 0.25 mg/0.5 mL pen injector    semaglutide 0.25 mg or 0.5 mg (2 mg/3 mL) pen injector    Other Relevant Orders    Referral to Nutrition Services    BMI 40.0-44.9, adult (CMS/[facility])    Relevant  Medications    semaglutide, weight loss, (Wegovy) 0.25 mg/0.5 mL pen injector    semaglutide 0.25 mg or 0.5 mg (2 mg/3 mL) pen injector    Other Relevant Orders    Referral to Nutrition Services    Cigarette nicotine dependence in remission    Relevant Medications    buPROPion SR (Wellbutrin SR) 150 mg 12 hr tablet    Difficulty urinating    Relevant Medications    tamsulosin (Flomax) 0.4 mg 24 hr capsule    Urinary urgency    Relevant Medications    tamsulosin (Flomax) 0.4 mg 24 hr capsule    Other Relevant Orders    POCT UA (nonautomated) manually resulted (Completed)    Urine Culture (Completed)    Onychomycosis    Relevant Orders    Referral to Podiatry    Snoring    Relevant Orders    Home sleep apnea test (HSAT)    Apneic episode    Relevant Orders    Home sleep apnea test (HSAT)    Excessive daytime sleepiness    Relevant Orders    Home sleep apnea test (HSAT)    Thyroid Stimulating Hormone    Encounter for prostate cancer screening    Relevant Orders    Prostate Specific Antigen, Screen    ETOH abuse     Other Visit Diagnoses       Hematuria, unspecified type        Relevant Orders    Referral to Urology              Scribe Attestation  By signing my name below, INilam RMA, Scribe   attest that this documentation has been prepared under the direction and in the presence of Mandeep Tucker DO.   Provider Attestation - Scribe documentation    All medical record entries made by the Scribe were at my direction and personally dictated by me. I have reviewed the chart and agree that the record accurately reflects my personal performance of the history, physical exam, discussion and plan.

## 2023-08-24 NOTE — PATIENT INSTRUCTIONS
Follow up in 6 WEEKS    Continue current medications and therapy for chronic medical conditions.    Patient was advised importance of proper diet/nutrition in addition adequate hydration. Patient was encouraged moderate exercise program to include 30 minutes daily for 5 days of the week or 150 minutes weekly. Patient will follow-up with us as scheduled.    REFERRED TO NUTRITIONIST     REFERRED TO PODIATRY     OBTAIN HOME SLEEP STUDY     START WELLBUTRIN SR 150MG TWICE DAILY     START WEGOVY 0.25MG ONCE WEEKLY    Review lab results from September 2021    OBTAIN FASTING LIPID, CMP, CBC, PSA, AND TSH     START TAMSULOSIN 0.4MG ONCE DAILY     REFERRED TO UROLOGY     IO UA, GLUCOSE AND A1C TODAY    Start Wegovy 0.25 mg weekly

## 2023-08-24 NOTE — LETTER
August 28, 2023     Jaycee Harp  [address] [place] [address]      Dear Mr. Harp:    Below are the results from your recent visit:    LAB RESULTS WITHIN NORMAL LIMITS     Resulted Orders   POCT Fingerstick Glucose manually resulted   Result Value Ref Range    POC Fingerstick Blood Glucose 107 (A) 70 - 100 mg/dl   POCT glycosylated hemoglobin (Hb A1C) manually resulted   Result Value Ref Range    POC HEMOGLOBIN A1c 6.1 4.2 - 6.5 %   POCT UA (nonautomated) manually resulted   Result Value Ref Range    POC Color, Urine Yellow Straw, Yellow, Light Yellow    POC Appearance, Urine Cloudy (A) Clear    POC Specific Gravity, Urine 1.025 1.005 - 1.035    POC PH, Urine 5.5 No Reference Range Established PH    POC Protein, Urine NEGATIVE NEGATIVE, 30 (1+) mg/dl    POC Glucose, Urine NEGATIVE NEGATIVE mg/dl    POC Blood, Urine MODERATE (2+) (A) NEGATIVE    POC Ketones, Urine NEGATIVE NEGATIVE mg/dl    POC Bilirubin, Urine NEGATIVE NEGATIVE    POC Urobilinogen, Urine 1.0 0.2, 1.0 EU/DL    Poc Nitrate, Urine NEGATIVE NEGATIVE    POC Leukocytes, Urine NEGATIVE NEGATIVE   Urine Culture   Result Value Ref Range    Urine Culture **Culture Comments - See Below     Narrative    **Culture Comments: NO GROWTH     The test results show that your current treatment is working. Please continue your current medication and plan.     If you have any questions or concerns, please don't hesitate to call.         Sincerely,        Mandeep Tucker, DO

## 2023-08-27 LAB — URINE CULTURE: NORMAL

## 2023-08-28 ENCOUNTER — LAB (OUTPATIENT)
Dept: LAB | Facility: LAB | Age: 63
End: 2023-08-28
Payer: COMMERCIAL

## 2023-08-28 DIAGNOSIS — Z12.5 ENCOUNTER FOR PROSTATE CANCER SCREENING: ICD-10-CM

## 2023-08-28 DIAGNOSIS — G47.19 EXCESSIVE DAYTIME SLEEPINESS: ICD-10-CM

## 2023-08-28 DIAGNOSIS — R73.9 HYPERGLYCEMIA: ICD-10-CM

## 2023-08-28 DIAGNOSIS — E78.5 DYSLIPIDEMIA: ICD-10-CM

## 2023-08-28 LAB
BASOPHILS (10*3/UL) IN BLOOD BY AUTOMATED COUNT: 0.03 X10E9/L (ref 0–0.1)
BASOPHILS/100 LEUKOCYTES IN BLOOD BY AUTOMATED COUNT: 0.3 % (ref 0–2)
EOSINOPHILS (10*3/UL) IN BLOOD BY AUTOMATED COUNT: 0.05 X10E9/L (ref 0–0.7)
EOSINOPHILS/100 LEUKOCYTES IN BLOOD BY AUTOMATED COUNT: 0.4 % (ref 0–6)
ERYTHROCYTE DISTRIBUTION WIDTH (RATIO) BY AUTOMATED COUNT: 13.1 % (ref 11.5–14.5)
ERYTHROCYTE MEAN CORPUSCULAR HEMOGLOBIN CONCENTRATION (G/DL) BY AUTOMATED: 32.9 G/DL (ref 32–36)
ERYTHROCYTE MEAN CORPUSCULAR VOLUME (FL) BY AUTOMATED COUNT: 105 FL (ref 80–100)
ERYTHROCYTES (10*6/UL) IN BLOOD BY AUTOMATED COUNT: 4.03 X10E12/L (ref 4.5–5.9)
HEMATOCRIT (%) IN BLOOD BY AUTOMATED COUNT: 42.2 % (ref 41–52)
HEMOGLOBIN (G/DL) IN BLOOD: 13.9 G/DL (ref 13.5–17.5)
IMMATURE GRANULOCYTES/100 LEUKOCYTES IN BLOOD BY AUTOMATED COUNT: 0.5 % (ref 0–0.9)
LEUKOCYTES (10*3/UL) IN BLOOD BY AUTOMATED COUNT: 11.2 X10E9/L (ref 4.4–11.3)
LYMPHOCYTES (10*3/UL) IN BLOOD BY AUTOMATED COUNT: 2.22 X10E9/L (ref 1.2–4.8)
LYMPHOCYTES/100 LEUKOCYTES IN BLOOD BY AUTOMATED COUNT: 19.8 % (ref 13–44)
MONOCYTES (10*3/UL) IN BLOOD BY AUTOMATED COUNT: 1.28 X10E9/L (ref 0.1–1)
MONOCYTES/100 LEUKOCYTES IN BLOOD BY AUTOMATED COUNT: 11.4 % (ref 2–10)
NEUTROPHILS (10*3/UL) IN BLOOD BY AUTOMATED COUNT: 7.56 X10E9/L (ref 1.2–7.7)
NEUTROPHILS/100 LEUKOCYTES IN BLOOD BY AUTOMATED COUNT: 67.6 % (ref 40–80)
NRBC (PER 100 WBCS) BY AUTOMATED COUNT: 0.2 /100 WBC (ref 0–0)
PLATELETS (10*3/UL) IN BLOOD AUTOMATED COUNT: 248 X10E9/L (ref 150–450)

## 2023-08-28 PROCEDURE — 85025 COMPLETE CBC W/AUTO DIFF WBC: CPT

## 2023-08-28 PROCEDURE — 84443 ASSAY THYROID STIM HORMONE: CPT

## 2023-08-28 PROCEDURE — 80061 LIPID PANEL: CPT

## 2023-08-28 PROCEDURE — 80053 COMPREHEN METABOLIC PANEL: CPT

## 2023-08-28 PROCEDURE — 84153 ASSAY OF PSA TOTAL: CPT

## 2023-08-28 PROCEDURE — 36415 COLL VENOUS BLD VENIPUNCTURE: CPT

## 2023-08-29 DIAGNOSIS — R06.83 SNORING: ICD-10-CM

## 2023-08-29 DIAGNOSIS — G47.19 EXCESSIVE DAYTIME SLEEPINESS: ICD-10-CM

## 2023-08-29 LAB
ALANINE AMINOTRANSFERASE (SGPT) (U/L) IN SER/PLAS: 25 U/L (ref 10–52)
ALBUMIN (G/DL) IN SER/PLAS: 4.1 G/DL (ref 3.4–5)
ALKALINE PHOSPHATASE (U/L) IN SER/PLAS: 79 U/L (ref 33–136)
ANION GAP IN SER/PLAS: 11 MMOL/L (ref 10–20)
ASPARTATE AMINOTRANSFERASE (SGOT) (U/L) IN SER/PLAS: 21 U/L (ref 9–39)
BILIRUBIN TOTAL (MG/DL) IN SER/PLAS: 0.6 MG/DL (ref 0–1.2)
CALCIUM (MG/DL) IN SER/PLAS: 8.8 MG/DL (ref 8.6–10.3)
CARBON DIOXIDE, TOTAL (MMOL/L) IN SER/PLAS: 34 MMOL/L (ref 21–32)
CHLORIDE (MMOL/L) IN SER/PLAS: 98 MMOL/L (ref 98–107)
CHOLESTEROL (MG/DL) IN SER/PLAS: 132 MG/DL (ref 0–199)
CHOLESTEROL IN HDL (MG/DL) IN SER/PLAS: 56.4 MG/DL
CHOLESTEROL/HDL RATIO: 2.3
CREATININE (MG/DL) IN SER/PLAS: 1.03 MG/DL (ref 0.5–1.3)
GFR MALE: 81 ML/MIN/1.73M2
GLUCOSE (MG/DL) IN SER/PLAS: 84 MG/DL (ref 74–99)
LDL: 57 MG/DL (ref 0–99)
POTASSIUM (MMOL/L) IN SER/PLAS: 4.2 MMOL/L (ref 3.5–5.3)
PROSTATE SPECIFIC ANTIGEN,SCREEN: 0.92 NG/ML (ref 0–4)
PROTEIN TOTAL: 7.5 G/DL (ref 6.4–8.2)
SODIUM (MMOL/L) IN SER/PLAS: 139 MMOL/L (ref 136–145)
THYROTROPIN (MIU/L) IN SER/PLAS BY DETECTION LIMIT <= 0.05 MIU/L: 2.47 MIU/L (ref 0.44–3.98)
TRIGLYCERIDE (MG/DL) IN SER/PLAS: 95 MG/DL (ref 0–149)
UREA NITROGEN (MG/DL) IN SER/PLAS: 11 MG/DL (ref 6–23)
VLDL: 19 MG/DL (ref 0–40)

## 2023-09-26 PROBLEM — S06.6X0D: Status: ACTIVE | Noted: 2023-09-26

## 2023-09-26 PROBLEM — E66.812 CLASS 2 SEVERE OBESITY DUE TO EXCESS CALORIES WITH SERIOUS COMORBIDITY AND BODY MASS INDEX (BMI) OF 38.0 TO 38.9 IN ADULT: Status: ACTIVE | Noted: 2023-09-26

## 2023-09-26 PROBLEM — E66.01 CLASS 2 SEVERE OBESITY DUE TO EXCESS CALORIES WITH SERIOUS COMORBIDITY AND BODY MASS INDEX (BMI) OF 38.0 TO 38.9 IN ADULT (MULTI): Status: ACTIVE | Noted: 2023-09-26

## 2023-09-26 PROBLEM — R63.5 WEIGHT GAIN: Status: ACTIVE | Noted: 2023-09-26

## 2023-09-26 PROBLEM — R53.83 FATIGUE: Status: ACTIVE | Noted: 2023-09-26

## 2023-09-26 RX ORDER — PHENTERMINE HYDROCHLORIDE 37.5 MG/1
37.5 TABLET ORAL DAILY
COMMUNITY
End: 2023-10-05 | Stop reason: SDUPTHER

## 2023-09-26 RX ORDER — OMEPRAZOLE 20 MG/1
1 CAPSULE, DELAYED RELEASE ORAL 2 TIMES DAILY
COMMUNITY
End: 2023-10-05 | Stop reason: SDUPTHER

## 2023-09-26 RX ORDER — CLOTRIMAZOLE AND BETAMETHASONE DIPROPIONATE 10; .5 MG/ML; MG/ML
1 LOTION TOPICAL
COMMUNITY
End: 2023-10-05 | Stop reason: ALTCHOICE

## 2023-09-26 RX ORDER — GLUCOSAMINE HCL 500 MG
1 TABLET ORAL DAILY
COMMUNITY
End: 2023-10-05 | Stop reason: ALTCHOICE

## 2023-10-04 ENCOUNTER — NUTRITION (OUTPATIENT)
Dept: NUTRITION | Facility: HOSPITAL | Age: 63
End: 2023-10-04
Payer: COMMERCIAL

## 2023-10-04 VITALS — BODY MASS INDEX: 43.84 KG/M2 | WEIGHT: 296 LBS | HEIGHT: 69 IN

## 2023-10-04 DIAGNOSIS — E66.01 MORBID OBESITY (MULTI): Primary | ICD-10-CM

## 2023-10-04 DIAGNOSIS — E78.5 DYSLIPIDEMIA: ICD-10-CM

## 2023-10-04 DIAGNOSIS — R63.5 WEIGHT GAIN: ICD-10-CM

## 2023-10-04 PROCEDURE — 97803 MED NUTRITION INDIV SUBSEQ: CPT | Performed by: FAMILY MEDICINE

## 2023-10-04 NOTE — PROGRESS NOTES
"Assessment     Reason for Visit:  Jaycee Harp is a 63 y.o. male who presents for Weight Loss    Anthropometrics:  Anthropometrics  Height: 175.3 cm (5' 9\")  Weight: 134 kg (296 lb) (pt declined to be weighed today, states he is meeting with PCP tomorrow.)  BMI (Calculated): 43.69         Food And Nutrient Intake:  Food and Nutrient History  Food and Nutrient History: Pt here today for follow up. Pt feels he is struggling with diet - quite smoking and drinking at the same time. On wegovy - feels nauseous when eating - has also been over eating to the point makes him feel sick. Has noticed more nausea with the dose increase lasty week. Has not changed eating habits - eating burgers, fries, pepsi - eating high carb at breakfast, skipping lunch and then eating dinner when he is home and snacking until he goes to bed. Started drinking gatorade aid, water. Intense sweet craving since stopping alcohol.  Fluid Intake: water, Gator aid  GI Symptoms: nausea  Oral Problems: denies  Dentition: own     Food Intake  Meal 1: honeybun and chocolate milk  Meal 2: gatorade  Meal 3: humburger and fries, ramen and tuna  Snacks: ice cream, crackers, pop until bedtime    Physical Activities:  Physical Activity  Physical Activity History: Pt states he plans to join [employer], friend offered to ride bike with him. Job is active - works as a     Nutrition Focused Physical Exam:  No signs/symptoms of malnutrition at this time. No weight/appetite changes currently      Energy Needs  Calculated Energy Needs Using Equations  Height: 175.3 cm (5' 9\")        Diagnosis      Nutrition Diagnosis  Patient has Nutrition Diagnosis: Yes  Diagnosis Status (1): Ongoing  Nutrition Diagnosis 1: Obese  Related to (1): excessive caloric intake with sedentary lifestyle  As Evidenced by (1): pt interview and diet recall, BMI of 43.71    Interventions/Recommendations   Food and Nutrition Delivery  Meals & Snacks: Carbohydrate-modified diet, " Energy-modified diet, Fiber-modified diet, General Healthful Diet, Protein-modified diet, Modify schedule of foods/fluids, Modify Composition of Meals/Snacks  Goals: 3 meals with 1-2 snacks between; ensure adeqaute protein with meals and snacks; plate method for portion control  Nutrition Education  Nutrition Education Content: Content related nutrition education, Education on nutrition's influence on health, Physical activity guidance  Goals: Instructed on counting macronutrient intake, proper portion sizes, label reading and general healthful nutrition. Instructed on benefits of wt loss with diabetes and heart health. Discussed mindful eating, slow gradual wt loss and goals beyond wt. Instructed pt to not skip meals - discussed this may lead to over eating later or snacking more than planned. Instructed on importance of physical activity for wt loss and maintenance of wt loss. Both verbal and written education provided in the one-on-one setting. Pt verbalized understanding of information provided. All questions answered to pt satisfaction throughout visit        Patient Instructions   Follow plate method when planning meals (1/2 plate non-starchy vegetables, 1/4 plate lean protein, 1/4 plate carbohydrates).   Increase fiber from fruits, vegetables, whole grains, and beans/lentils  Choose lean protein sources including chicken, turkey, seafood, and eggs.   Aim for at least 3 meals per day - be sure to include protein with each meal and snack  Limit added sugar to less than 37 g per day   Aim for at least 64 oz of water daily   Encouraged regular physical activity including strength training as medically appropriate per AHA guidelines   Monitoring and Evaluation   Food/Nutrient Related History Monitoring  Monitoring and Evaluation Plan: Energy intake, Amount of food, Meal/snack pattern, Protein intake        Time Spent  Time spent directly with patient, family or caregiver: 30 minutes  Documentation Time: 15  minutes        Readiness to Change : Good  Level of Understanding : Good  Anticipated Compliant : Fair

## 2023-10-04 NOTE — PATIENT INSTRUCTIONS
Follow plate method when planning meals (1/2 plate non-starchy vegetables, 1/4 plate lean protein, 1/4 plate carbohydrates).   Increase fiber from fruits, vegetables, whole grains, and beans/lentils  Choose lean protein sources including chicken, turkey, seafood, and eggs.   Aim for at least 3 meals per day - be sure to include protein with each meal and snack  Limit added sugar to less than 37 g per day   Aim for at least 64 oz of water daily   Encouraged regular physical activity including strength training as medically appropriate per AHA guidelines

## 2023-10-04 NOTE — LETTER
"October 5, 2023     Mandeep Tucker DO  [address]  [place]  [place] [address]    Patient: Jaycee Harp   YOB: 1960   Date of Visit: 10/4/2023       Dear Dr. Mandeep Tucker DO:    Thank you for referring Jaycee Harp to me for evaluation. Below are my notes for this consultation.  If you have questions, please do not hesitate to call me. I look forward to following your patient along with you.       Sincerely,     Fernanda Chapa, WARREN, LD      CC: No Recipients  ______________________________________________________________________________________    Assessment    Reason for Visit:  Jaycee Harp is a 63 y.o. male who presents for Weight Loss    Anthropometrics:  Anthropometrics  Height: 175.3 cm (5' 9\")  Weight: 134 kg (296 lb) (pt declined to be weighed today, states he is meeting with PCP tomorrow.)  BMI (Calculated): 43.69         Food And Nutrient Intake:  Food and Nutrient History  Food and Nutrient History: Pt here today for follow up. Pt feels he is struggling with diet - quite smoking and drinking at the same time. On wegovy - feels nauseous when eating - has also been over eating to the point makes him feel sick. Has noticed more nausea with the dose increase lasty week. Has not changed eating habits - eating burgers, fries, pepsi - eating high carb at breakfast, skipping lunch and then eating dinner when he is home and snacking until he goes to bed. Started drinking gatorade aid, water. Intense sweet craving since stopping alcohol.  Fluid Intake: water, Gator aid  GI Symptoms: nausea  Oral Problems: denies  Dentition: own     Food Intake  Meal 1: honeybun and chocolate milk  Meal 2: gatorade  Meal 3: humburger and fries, ramen and tuna  Snacks: ice cream, crackers, pop until bedtime    Physical Activities:  Physical Activity  Physical Activity History: Pt states he plans to join [employer], friend offered to ride bike with him. Job is active - works as a     Nutrition Focused Physical " "Exam:  No signs/symptoms of malnutrition at this time. No weight/appetite changes currently      Energy Needs  Calculated Energy Needs Using Equations  Height: 175.3 cm (5' 9\")        Diagnosis     Nutrition Diagnosis  Patient has Nutrition Diagnosis: Yes  Diagnosis Status (1): Ongoing  Nutrition Diagnosis 1: Obese  Related to (1): excessive caloric intake with sedentary lifestyle  As Evidenced by (1): pt interview and diet recall, BMI of 43.71    Interventions/Recommendations  Food and Nutrition Delivery  Meals & Snacks: Carbohydrate-modified diet, Energy-modified diet, Fiber-modified diet, General Healthful Diet, Protein-modified diet, Modify schedule of foods/fluids, Modify Composition of Meals/Snacks  Goals: 3 meals with 1-2 snacks between; ensure adeqaute protein with meals and snacks; plate method for portion control  Nutrition Education  Nutrition Education Content: Content related nutrition education, Education on nutrition's influence on health, Physical activity guidance  Goals: Instructed on counting macronutrient intake, proper portion sizes, label reading and general healthful nutrition. Instructed on benefits of wt loss with diabetes and heart health. Discussed mindful eating, slow gradual wt loss and goals beyond wt. Instructed pt to not skip meals - discussed this may lead to over eating later or snacking more than planned. Instructed on importance of physical activity for wt loss and maintenance of wt loss. Both verbal and written education provided in the one-on-one setting. Pt verbalized understanding of information provided. All questions answered to pt satisfaction throughout visit        Patient Instructions   Follow plate method when planning meals (1/2 plate non-starchy vegetables, 1/4 plate lean protein, 1/4 plate carbohydrates).   Increase fiber from fruits, vegetables, whole grains, and beans/lentils  Choose lean protein sources including chicken, turkey, seafood, and eggs.   Aim for at " least 3 meals per day - be sure to include protein with each meal and snack  Limit added sugar to less than 37 g per day   Aim for at least 64 oz of water daily   Encouraged regular physical activity including strength training as medically appropriate per AHA guidelines   Monitoring and Evaluation  Food/Nutrient Related History Monitoring  Monitoring and Evaluation Plan: Energy intake, Amount of food, Meal/snack pattern, Protein intake        Time Spent  Time spent directly with patient, family or caregiver: 30 minutes  Documentation Time: 15 minutes        Readiness to Change : Good  Level of Understanding : Good  Anticipated Compliant : Fair

## 2023-10-05 ENCOUNTER — OFFICE VISIT (OUTPATIENT)
Dept: PRIMARY CARE | Facility: CLINIC | Age: 63
End: 2023-10-05
Payer: COMMERCIAL

## 2023-10-05 VITALS
HEIGHT: 69 IN | SYSTOLIC BLOOD PRESSURE: 110 MMHG | RESPIRATION RATE: 16 BRPM | WEIGHT: 297.6 LBS | OXYGEN SATURATION: 95 % | BODY MASS INDEX: 44.08 KG/M2 | HEART RATE: 84 BPM | DIASTOLIC BLOOD PRESSURE: 60 MMHG

## 2023-10-05 DIAGNOSIS — G89.29 CHRONIC PAIN OF RIGHT KNEE: Primary | ICD-10-CM

## 2023-10-05 DIAGNOSIS — M25.561 CHRONIC PAIN OF RIGHT KNEE: Primary | ICD-10-CM

## 2023-10-05 DIAGNOSIS — R73.9 HYPERGLYCEMIA: ICD-10-CM

## 2023-10-05 DIAGNOSIS — Z12.11 ENCOUNTER FOR COLORECTAL CANCER SCREENING: ICD-10-CM

## 2023-10-05 DIAGNOSIS — B35.1 ONYCHOMYCOSIS: ICD-10-CM

## 2023-10-05 DIAGNOSIS — E66.01 MORBID OBESITY (MULTI): ICD-10-CM

## 2023-10-05 DIAGNOSIS — K21.9 GASTROESOPHAGEAL REFLUX DISEASE WITHOUT ESOPHAGITIS: ICD-10-CM

## 2023-10-05 DIAGNOSIS — Z12.12 ENCOUNTER FOR COLORECTAL CANCER SCREENING: ICD-10-CM

## 2023-10-05 PROCEDURE — 4004F PT TOBACCO SCREEN RCVD TLK: CPT | Performed by: FAMILY MEDICINE

## 2023-10-05 PROCEDURE — 3008F BODY MASS INDEX DOCD: CPT | Performed by: FAMILY MEDICINE

## 2023-10-05 PROCEDURE — 99214 OFFICE O/P EST MOD 30 MIN: CPT | Performed by: FAMILY MEDICINE

## 2023-10-05 RX ORDER — OMEPRAZOLE 20 MG/1
20 CAPSULE, DELAYED RELEASE ORAL 2 TIMES DAILY
Qty: 90 CAPSULE | Refills: 1 | Status: SHIPPED | OUTPATIENT
Start: 2023-10-05 | End: 2023-11-13 | Stop reason: SDUPTHER

## 2023-10-05 RX ORDER — PHENTERMINE HYDROCHLORIDE 37.5 MG/1
37.5 TABLET ORAL DAILY
Qty: 30 TABLET | Refills: 0 | Status: SHIPPED | OUTPATIENT
Start: 2023-10-05 | End: 2023-11-13 | Stop reason: SDUPTHER

## 2023-10-05 ASSESSMENT — ENCOUNTER SYMPTOMS
POLYPHAGIA: 0
ACTIVITY CHANGE: 0
SLEEP DISTURBANCE: 0
CONFUSION: 0
ARTHRALGIAS: 1
SHORTNESS OF BREATH: 1
FATIGUE: 0
TROUBLE SWALLOWING: 0
SINUS PRESSURE: 0
ABDOMINAL DISTENTION: 0
DYSPHORIC MOOD: 0
PALPITATIONS: 0
DIARRHEA: 0
HEADACHES: 0
RHINORRHEA: 0
ABDOMINAL PAIN: 0
DIZZINESS: 0
NECK STIFFNESS: 0
RECTAL PAIN: 0
BLOOD IN STOOL: 0
HEMATURIA: 0
CHEST TIGHTNESS: 0
FLANK PAIN: 0
APPETITE CHANGE: 0
COLOR CHANGE: 0
STRIDOR: 0
NERVOUS/ANXIOUS: 0
FEVER: 0
POLYDIPSIA: 0
SINUS PAIN: 0
ADENOPATHY: 0
CONSTIPATION: 0
SORE THROAT: 0
EYE PAIN: 0
PHOTOPHOBIA: 0
DECREASED CONCENTRATION: 0
DYSURIA: 0
AGITATION: 0
CONSTITUTIONAL NEGATIVE: 1
COUGH: 0
SPEECH DIFFICULTY: 0
MYALGIAS: 0
SEIZURES: 0

## 2023-10-05 NOTE — LETTER
November 16, 2023     Jaycee Harp  [address] [place] [address]      Dear Mr. Harp:    Below are the results from your recent visit:    COLOGUARD IS NEGATIVE     Resulted Orders   Cologuard® colon cancer screening   Result Value Ref Range    NONINV COLON CA DNA+OCC BLD SCRN STL QL Negative Negative      Comment:        NEGATIVE TEST RESULT. A negative Cologuard result indicates a low likelihood that a colorectal cancer (CRC) or advanced adenoma (adenomatous polyps with more advanced pre-malignant features)  is present. The chance that a person with a negative Cologuard test has a colorectal cancer is less than 1 in 1500 (negative predictive value >99.9%) or has an  advanced adenoma is less than  5.3% (negative predictive value 94.7%). These data are based on a prospective cross-sectional study of 10,000 individuals at average risk for colorectal cancer who were screened with both Cologuard and colonoscopy. (Caren WHEAT. et al, N Engl J Med 2014;370(14):9964-5588) The normal value (reference range) for this assay is negative.    COLOGUARD RE-SCREENING RECOMMENDATION: Periodic colorectal cancer screening is an important part of preventive healthcare for asymptomatic individuals at average risk for colorectal cancer.  Following a negative Cologuard result, the American Cancer Society and U.S.  Multi-Society Task Force screening guidelines recommend a Cologuard re-screening interval of 3 years.   References: American Cancer Society Guideline for Colorectal Cancer Screening: https://www.cancer.org/cancer/colon-rectal-cancer/edmsyyray-qiheojnse-xbisrir/acs-recommendations.html.; Gume LORD, Shweta TELLO, Enriqueta ESPINOZAK, Colorectal Cancer Screening: Recommendations for Physicians and Patients from the U.S. Multi-Society Task Force on Colorectal Cancer Screening , Am J Gastroenterology 2017; 112:1279-0147.    TEST DESCRIPTION: Composite algorithmic analysis of stool DNA-biomarkers with hemoglobin immunoassay.   Quantitative  Subjective


Progress Note for:: 11/16/17


Subjective:: 





No issues. 





Physical Exam


Vital Signs: 


 











Temp Pulse Resp BP Pulse Ox


 


 97.6 F   95   18   148/79 H  100 


 


 11/16/17 07:22  11/16/17 07:22  11/16/17 07:22  11/16/17 07:22  11/16/17 07:22








 Intake & Output











 11/15/17 11/16/17 11/17/17





 06:59 06:59 06:59


 


Intake Total 1748 242 


 


Balance 1748 242 


 


Weight 67.7 kg 69.3 kg 











General appearance: PRESENT: no acute distress, well-developed, well-nourished


Head exam: PRESENT: atraumatic, normocephalic


Eye exam: PRESENT: other - sleeping


Ear exam: PRESENT: normal external ear exam


Mouth exam: PRESENT: moist, tongue midline


Neck exam: ABSENT: carotid bruit, JVD, lymphadenopathy, thyromegaly


Respiratory exam: PRESENT: clear to auscultation alexandra.  ABSENT: rales, rhonchi, 

wheezes


Cardiovascular exam: PRESENT: RRR.  ABSENT: diastolic murmur, rubs, systolic 

murmur


Pulses: PRESENT: normal dorsalis pedis pul


Vascular exam: PRESENT: normal capillary refill


GI/Abdominal exam: PRESENT: normal bowel sounds, soft.  ABSENT: distended, 

guarding, mass, organolmegaly, rebound, tenderness


Rectal exam: PRESENT: deferred


Extremities exam: PRESENT: full ROM.  ABSENT: calf tenderness, clubbing, pedal 

edema


Neurological exam: PRESENT: other - sleeping


Skin exam: PRESENT: dry, intact, warm.  ABSENT: cyanosis, rash





Results


Laboratory Results: 


 





 11/06/17 07:09 





 11/09/17 05:54 








Impressions: 


 





Chest X-Ray  11/03/17 08:59


IMPRESSION:  Minimal bibasilar atelectasis.


 








Shoulder X-Ray  11/03/17 08:59


IMPRESSION:  Osteoporotic.  No acute fracture or malalignment.


 








Chest/Abdomen CTA  11/03/17 12:02


IMPRESSION:  Obstructive lung disease.


Bibasilar atelectasis


No CT angio evidence of acute pulmonary emboli.


 














Assessment & Plan





- Diagnosis


(1) Sepsis


Is this a current diagnosis for this admission?: Yes   


Plan: 


Secondary to Acute Cystitis due to E. Coli and Enterococcus:  Will continue 

Zyvox for 7 days.  Stop date for Zyvox is 11/17/2017.








(2) Moderate protein-calorie malnutrition


Is this a current diagnosis for this admission?: Yes   


Plan: 


Encourage good PO intake.  








(3) UTI (urinary tract infection)


Qualifiers: 


   Urinary tract infection type: acute cystitis   Hematuria presence: with 

hematuria   Qualified Code(s): N30.01 - Acute cystitis with hematuria   


Is this a current diagnosis for this admission?: Yes   


Plan: 


Secondary to E.coli and Enterococcus:  continue Zyvox for total of 7 days. 








(4) Hypotension


Is this a current diagnosis for this admission?: Yes   


Plan: 


Resolved.  Will continue to monitor. 








(5) Left shoulder pain


Qualifiers: 


   Chronicity: chronic   Qualified Code(s): M25.512 - Pain in left shoulder; 

G89.29 - Other chronic pain; G89.29 - Other chronic pain   


Is this a current diagnosis for this admission?: Yes   


Plan: 


Supportive care. 








(6) NSTEMI (non-ST elevated myocardial infarction)


Is this a current diagnosis for this admission?: Yes   


Plan: 


Medical management. 








(7) Pulmonary emboli


Qualifiers: 


   Pulmonary embolism type: other 


Is this a current diagnosis for this admission?: No   


Plan: 


Will continue Xarelto.








(8) DNR (do not resuscitate)


Is this a current diagnosis for this admission?: Yes   


Plan: 


Pt currently DNR.  Will discuss with family regarding comfort care measures. 








(9) Anemia


Qualifiers: 


   Iron deficiency anemia type: other iron deficiency 


Is this a current diagnosis for this admission?: Yes   


Plan: 


Will do anemia workup.  








(10) Hypomagnesemia


Is this a current diagnosis for this admission?: Yes   


Plan: 


Will give Magnesium replacement.








(11) DVT prophylaxis


Is this a current diagnosis for this admission?: Yes   


Plan: 


xarelto








- Time


Time Spent with patient: 15-24 minutes values of individual biomarkers are not reportable and are not associated with individual biomarker result reference ranges. Cologuard is intended for colorectal cancer screening of adults of either sex, 45 years or older, who are at average-risk for colorectal cancer (CRC). Cologuard has been approved for use by the U.S. FDA. The performance of Cologuard was  established in a cross sectional study of average-risk adults aged 50-84. Cologuard performance in patients ages 45 to 49 years was estimated by sub-group analysis of near-age groups. Colonoscopies performed for a positive result may find as the most clinically significant lesion: colorectal cancer [4.0%], advanced adenoma (including sessile serrated polyps greater than or equal to 1cm diameter) [20%] or non- advanced adenoma [31%]; or no colorectal neoplasia [45%]. These estimates are derived from a prospective cross-sectional screening study of 10,000 individuals at average risk for colorectal cancer who were screened with both Cologuard and colonoscopy. (Caren WHEAT. et al, N Engl J Med 2014;370(14):5246-6774.) Cologuard may produce a false negative or false positive result (no colorectal cancer or precancerous polyp present at colonoscopy follow up). A negative Cologuard test result does not guarantee the absence of CRC or advanced adenoma (pre-cancer). The current Cologuard  screening interval is every 3 years. (American Cancer Society and U.S. Multi-Society Task Force). Cologuard performance data in a 10,000 patient pivotal study using colonoscopy as the reference method can be accessed at the following location: www.[employer].com.Handango/results. Additional description of the Cologuard test process, warnings and precautions can be found at www.[employer].com.       The test results show that your current treatment is working. Please continue your current medication and plan. We recommend that you repeat the above test(s) in 3 years.    If you have any questions  or concerns, please don't hesitate to call.         Sincerely,        Mandeep Tucker, DO

## 2023-10-05 NOTE — PATIENT INSTRUCTIONS
Follow up in 4 WEEKS     Continue current medications and therapy for chronic medical conditions.    Patient was advised importance of proper diet/nutrition in addition adequate hydration. Patient was encouraged moderate exercise program to include 30 minutes daily for 5 days of the week or 150 minutes weekly. Patient will follow-up with us as scheduled.    OBTAIN RIGHT KNEE X-RAY     REFERRED TO PODIATRY     COMPLETE COLOGUARD     START ADIPEX     START OZEMPIC 0.25MG ONCE WEEKLY

## 2023-10-05 NOTE — PROGRESS NOTES
Subjective   Patient ID: Jaycee Harp is a 63 y.o. male who presents for Weight Loss and Joint Swelling.    HPI Patient is following up on weight loss. He is finally feeling full with the medication. He has gained 1.6 lbs.  He has stopped smoking and drinking at the same time.     He is still having SOB and phlegm coming up from lungs.   He needs a copy of the podiatry referral to call and schedule.     Patient's insurance is not covering the Wegovy. He would like to restart the adipex and if samples are available , he would like the Wegovy  0.5 mg.                   He is having right knee swelling. He had a past injury . It is reduced due to not drinking.     He would like to review blood test  8/28/23 with him.     He would like to discuss colonoscopy versus Cologuard.    Review of Systems   Constitutional: Negative.  Negative for activity change, appetite change, fatigue and fever.   HENT:  Negative for congestion, dental problem, ear discharge, ear pain, mouth sores, rhinorrhea, sinus pressure, sinus pain, sore throat, tinnitus and trouble swallowing.    Eyes:  Negative for photophobia, pain and visual disturbance.   Respiratory:  Positive for shortness of breath. Negative for cough, chest tightness and stridor.    Cardiovascular:  Negative for chest pain and palpitations.   Gastrointestinal:  Negative for abdominal distention, abdominal pain, blood in stool, constipation, diarrhea and rectal pain.   Endocrine: Negative for cold intolerance, heat intolerance, polydipsia, polyphagia and polyuria.   Genitourinary:  Negative for dysuria, flank pain, hematuria and urgency.   Musculoskeletal:  Positive for arthralgias. Negative for gait problem, myalgias and neck stiffness.   Skin:  Negative for color change and rash.   Allergic/Immunologic: Negative for environmental allergies and food allergies.   Neurological:  Negative for dizziness, seizures, syncope, speech difficulty and headaches.   Hematological:  Negative  "for adenopathy.   Psychiatric/Behavioral:  Negative for agitation, confusion, decreased concentration, dysphoric mood and sleep disturbance. The patient is not nervous/anxious.        Objective   /60 (BP Location: Right arm, Patient Position: Sitting, BP Cuff Size: Adult)   Pulse 84   Resp 16   Ht 1.753 m (5' 9\")   Wt 135 kg (297 lb 9.6 oz)   SpO2 95%   BMI 43.95 kg/m²     Physical Exam  Vitals reviewed.   Constitutional:       General: He is not in acute distress.     Appearance: He is obese. He is not ill-appearing or diaphoretic.   HENT:      Head: Normocephalic.      Right Ear: Tympanic membrane and external ear normal.      Left Ear: Tympanic membrane and external ear normal.      Nose: Nose normal. No congestion.      Mouth/Throat:      Pharynx: No posterior oropharyngeal erythema.   Eyes:      General:         Right eye: No discharge.         Left eye: No discharge.      Extraocular Movements: Extraocular movements intact.      Conjunctiva/sclera: Conjunctivae normal.      Pupils: Pupils are equal, round, and reactive to light.   Cardiovascular:      Rate and Rhythm: Normal rate and regular rhythm.      Pulses: Normal pulses.      Heart sounds: Normal heart sounds. No murmur heard.  Pulmonary:      Effort: Pulmonary effort is normal. No respiratory distress.      Breath sounds: Normal breath sounds. No wheezing or rales.   Chest:      Chest wall: No tenderness.   Abdominal:      General: Bowel sounds are normal. There is distension.      Palpations: There is no mass.      Tenderness: There is no abdominal tenderness. There is no guarding.   Musculoskeletal:         General: No tenderness. Normal range of motion.      Cervical back: Normal range of motion and neck supple. No tenderness.      Right lower leg: Edema present.      Left lower leg: Edema present.   Skin:     General: Skin is dry.      Coloration: Skin is not jaundiced.      Findings: No bruising, erythema or rash.   Neurological:      " General: No focal deficit present.      Mental Status: He is alert and oriented to person, place, and time. Mental status is at baseline.      Cranial Nerves: No cranial nerve deficit.      Sensory: No sensory deficit.      Coordination: Coordination normal.      Gait: Gait normal.   Psychiatric:         Mood and Affect: Mood normal.         Thought Content: Thought content normal.         Judgment: Judgment normal.         Assessment/Plan   Problem List Items Addressed This Visit             ICD-10-CM    Acid reflux K21.9    Relevant Medications    omeprazole (PriLOSEC) 20 mg DR capsule    Hyperglycemia R73.9    Relevant Medications    semaglutide 0.25 mg or 0.5 mg (2 mg/3 mL) pen injector    Knee pain, right M25.561    Relevant Orders    XR knee right 1-2 views    Morbid obesity (CMS/[facility]) E66.01    Relevant Medications    phentermine (Adipex-P) 37.5 mg tablet    BMI 40.0-44.9, adult (CMS/[facility]) Z68.41    Relevant Medications    phentermine (Adipex-P) 37.5 mg tablet    Onychomycosis B35.1    Relevant Orders    Referral to Podiatry     Other Visit Diagnoses         Codes    Encounter for colorectal cancer screening     Z12.11, Z12.12    Relevant Orders    Cologuard® colon cancer screening            Time Spent  Time spent directly with patient, family or caregiver: 30 minutes  Documentation Time: 15 minutes    Scribe Attestation  By signing my name below, I, GABO Razo , Mortezaiblonny   attest that this documentation has been prepared under the direction and in the presence of Mandeep Tucker DO. .  Provider Attestation - Scribe documentation    All medical record entries made by the Scribe were at my direction and personally dictated by me. I have reviewed the chart and agree that the record accurately reflects my personal performance of the history, physical exam, discussion and plan.

## 2023-10-12 ENCOUNTER — OFFICE VISIT (OUTPATIENT)
Dept: UROLOGY | Facility: CLINIC | Age: 63
End: 2023-10-12
Payer: COMMERCIAL

## 2023-10-12 ENCOUNTER — ANCILLARY PROCEDURE (OUTPATIENT)
Dept: RADIOLOGY | Facility: CLINIC | Age: 63
End: 2023-10-12
Payer: COMMERCIAL

## 2023-10-12 VITALS
HEART RATE: 59 BPM | BODY MASS INDEX: 43.69 KG/M2 | HEIGHT: 69 IN | SYSTOLIC BLOOD PRESSURE: 147 MMHG | DIASTOLIC BLOOD PRESSURE: 86 MMHG | WEIGHT: 295 LBS

## 2023-10-12 DIAGNOSIS — M25.561 CHRONIC PAIN OF RIGHT KNEE: ICD-10-CM

## 2023-10-12 DIAGNOSIS — N52.9 ERECTILE DYSFUNCTION, UNSPECIFIED ERECTILE DYSFUNCTION TYPE: ICD-10-CM

## 2023-10-12 DIAGNOSIS — R31.29 OTHER MICROSCOPIC HEMATURIA: Primary | ICD-10-CM

## 2023-10-12 DIAGNOSIS — G89.29 CHRONIC PAIN OF RIGHT KNEE: ICD-10-CM

## 2023-10-12 PROBLEM — R31.9 HEMATURIA: Status: ACTIVE | Noted: 2023-10-12

## 2023-10-12 PROCEDURE — 3008F BODY MASS INDEX DOCD: CPT | Performed by: UROLOGY

## 2023-10-12 PROCEDURE — 99214 OFFICE O/P EST MOD 30 MIN: CPT | Performed by: UROLOGY

## 2023-10-12 PROCEDURE — 4004F PT TOBACCO SCREEN RCVD TLK: CPT | Performed by: UROLOGY

## 2023-10-12 PROCEDURE — 81001 URINALYSIS AUTO W/SCOPE: CPT | Performed by: UROLOGY

## 2023-10-12 PROCEDURE — 99204 OFFICE O/P NEW MOD 45 MIN: CPT | Performed by: UROLOGY

## 2023-10-12 PROCEDURE — 81001 URINALYSIS AUTO W/SCOPE: CPT | Mod: CMCLAB | Performed by: UROLOGY

## 2023-10-12 PROCEDURE — 73560 X-RAY EXAM OF KNEE 1 OR 2: CPT | Mod: RIGHT SIDE | Performed by: RADIOLOGY

## 2023-10-12 PROCEDURE — 73560 X-RAY EXAM OF KNEE 1 OR 2: CPT | Mod: RT,FY

## 2023-10-12 RX ORDER — TADALAFIL 20 MG/1
20 TABLET ORAL DAILY PRN
Qty: 30 TABLET | Refills: 0 | Status: SHIPPED | OUTPATIENT
Start: 2023-10-12 | End: 2023-11-13 | Stop reason: SDUPTHER

## 2023-10-12 NOTE — PROGRESS NOTES
History Of Present Illness  63-year-old male see me regarding hematuria  PMH: BMI 44, lower urinary tract symptoms, alcohol use, snoring  Social: Former smoker, 150 pack year hx  Functional - gets winded with limited activity  PSA 08/23: 0.92  UA 08/23: Moderate blood, otherwise unremarkable  No UA with micro present initially.    Pt reports no gross hematuria.   Had LUTS, but doing well on flomax. He had urgency, frequency.     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  Gets partial erections but sufficient for penetration.  No nocturnal erections.  No significant angulation.    Past Medical History  He has a past medical history of Contact with and (suspected) exposure to covid-19 (08/28/2020), Personal history of other endocrine, nutritional and metabolic disease (01/13/2021), and Personal history of other specified conditions (08/28/2020).    Surgical History  He has a past surgical history that includes Other surgical history (10/31/2019).     Social History  He reports that he has been smoking cigarettes. He has quit using smokeless tobacco. He reports current alcohol use. He reports that he does not use drugs.    Family History  Family History   Problem Relation Name Age of Onset    Diabetes Mother      Other (varicose veins) Mother      Liver cancer Mother      Diabetes Father      Tuberculosis Father          Allergies  Patient has no known allergies.    ROS: 12 system review was completed and is negative with the exception of those signs and symptoms noted in the history of present illness: A 12 system review was completed and is negative with the exception of those signs and symptoms noted in the history of present illness.     Exam:  General: in NAD, appears stated age  Head: normocephalic, atraumatic  Respiratory: normal effort, no use of accessory muscles  Cardiovascular: no edema noted  Skin: normal turgor, no rashes  Neurologic: grossly  "intact, oriented to person/place/time  Psychiatric: mode and affect appropriate  Abdomen: Obese, no surgical scars, no tenderness  : Circumcised, orthotopic patent meatus, testicles descended and orthotopic bilaterally  Digital rectal exam-only able to palpate the apex of the gland, no abnormality     Last Recorded Vitals  Blood pressure 147/86, pulse 59, height 1.753 m (5' 9\"), weight 134 kg (295 lb).    Lab Results   Component Value Date    PSASCREEN 0.92 08/28/2023    CREATININE 1.03 08/28/2023    HGB 13.9 08/28/2023         ASSESSMENT/PLAN:  #Hematuria  -At this point, we only have a UA with dipstick  -UA with micro  -We discussed that if this is positive for greater than 3 red blood cells, we will proceed with a CT urogram and cystoscopy  -He is high risk for malignancy given his smoking history    #Erectile dysfunction  -Tadalafil 20 mg as needed, discussed appropriate use and side effects    Jose Angulo MD    "

## 2023-10-13 LAB
APPEARANCE UR: CLEAR
BILIRUB UR STRIP.AUTO-MCNC: NEGATIVE MG/DL
COLOR UR: ABNORMAL
GLUCOSE UR STRIP.AUTO-MCNC: NEGATIVE MG/DL
KETONES UR STRIP.AUTO-MCNC: NEGATIVE MG/DL
LEUKOCYTE ESTERASE UR QL STRIP.AUTO: NEGATIVE
MUCOUS THREADS #/AREA URNS AUTO: ABNORMAL /LPF
NITRITE UR QL STRIP.AUTO: NEGATIVE
PH UR STRIP.AUTO: 5 [PH]
PROT UR STRIP.AUTO-MCNC: NEGATIVE MG/DL
RBC # UR STRIP.AUTO: ABNORMAL /UL
RBC #/AREA URNS AUTO: ABNORMAL /HPF
SP GR UR STRIP.AUTO: 1.02
UROBILINOGEN UR STRIP.AUTO-MCNC: 4 MG/DL
WBC #/AREA URNS AUTO: ABNORMAL /HPF

## 2023-10-26 ENCOUNTER — CLINICAL SUPPORT (OUTPATIENT)
Dept: SLEEP MEDICINE | Facility: CLINIC | Age: 63
End: 2023-10-26
Payer: COMMERCIAL

## 2023-10-26 VITALS — HEIGHT: 69 IN | BODY MASS INDEX: 43.76 KG/M2 | WEIGHT: 295.42 LBS

## 2023-10-26 DIAGNOSIS — R06.83 SNORING: ICD-10-CM

## 2023-10-26 DIAGNOSIS — G47.19 EXCESSIVE DAYTIME SLEEPINESS: ICD-10-CM

## 2023-10-26 DIAGNOSIS — G47.33 OBSTRUCTIVE SLEEP APNEA (ADULT) (PEDIATRIC): ICD-10-CM

## 2023-10-26 PROCEDURE — 95810 POLYSOM 6/> YRS 4/> PARAM: CPT | Performed by: INTERNAL MEDICINE

## 2023-10-26 NOTE — PROGRESS NOTES
[facility] TECH NOTE:     Patient: Jaycee Harp   MRN//AGE: [id number]  1960  63 y.o.   Technologist: Merlin Garcia   Room: 2   Service Date: 10/26/2023        Sleep Testing Location: [facility]    [place]: 11    TECHNOLOGIST SLEEP STUDY PROCEDURE NOTE:   This sleep study is being conducted according to the policies and procedures outlined by the AAS accreditation standards.  The sleep study procedure and processes involved during this appointment was explained to the patient/patient’s family, questions were answered. The patient/family verbalized understanding.      The patient is a 63 y.o. year old male scheduled for a Diagnostic PSG Split night with montage of:  PSG . he arrived for his appointment.      The study that was ultimately completed was a  Diagnostic PSG  with montage of:  PSG .    The full study Was completed.  Patient questionnaires completed?: yes     Consents signed? yes    Initial Fall Risk Screening:     Jaycee has not fallen in the last 6 months. his did not result in injury. Jaycee does not have a fear of falling. He does not need assistance with sitting, standing, or walking. he does not need assistance walking in his home. he does not need assistance in an unfamiliar setting. The patient is notusing an assistive device.     Brief Study observations: Pt presents as  62 y/o Male here for scheduled PSG/ Split.  Noted no override orders on record.  Study Observations:  Pt arrived on time, was alone, was ambulatory w/o assistance, and appeared alert/ oriented throughout interactions w/ sleep staff.  Throughout intervention window noted AHI > 20 w/ signs of hypoxemia accompanied by frequent arousals, frequent awakenings, frequent leg movements, intermittent mild snoring, and periodic WASO.  Discussed options for hypoxemia management w/ On-Call Physician.  Noted no abnormal behaviors throughout diagnostic portion of study.  Study Interventions:  Due to AHI > 10 in 120 mins of recorded  sleep w/ SpO2 desaturations < than threshold, administered 5cmH2O of CPAP using Respironics Nuance Pro Gel Frame Pillows Mask - Medium based on protocol for BMI.  Added Cflex +3 due to signs of discomfort/ to improve compliance.  Swapped to F&P Jerry Full Face mask - Medium.  Pt then refused CPAP after approximately 17 mins of titration time.  Completed remainder of study as PSG.  Pt refused Nocturnal O2 therapy.    Additional Notes:  Pt administered own melatonin prior to start of study.  Pt informed sleep staff that he has old left parietal skull injury due to fall from ladder.     Deviation to order/protocol and reason: N/A      If PAP, which was preferred mask/pressure/mode: N/A      Other:Physician on call was contacted : Dr Russo    After the procedure, the patient/family was informed to ensure followup with ordering clinician for testing results.      Technologist: Merlin Garcia

## 2023-11-02 ENCOUNTER — APPOINTMENT (OUTPATIENT)
Dept: PRIMARY CARE | Facility: CLINIC | Age: 63
End: 2023-11-02
Payer: COMMERCIAL

## 2023-11-09 ENCOUNTER — APPOINTMENT (OUTPATIENT)
Dept: PRIMARY CARE | Facility: CLINIC | Age: 63
End: 2023-11-09
Payer: COMMERCIAL

## 2023-11-10 LAB — NONINV COLON CA DNA+OCC BLD SCRN STL QL: NEGATIVE

## 2023-11-13 DIAGNOSIS — F17.211 CIGARETTE NICOTINE DEPENDENCE IN REMISSION: ICD-10-CM

## 2023-11-13 DIAGNOSIS — R39.15 URINARY URGENCY: ICD-10-CM

## 2023-11-13 DIAGNOSIS — R39.198 DIFFICULTY URINATING: ICD-10-CM

## 2023-11-13 DIAGNOSIS — N52.9 ERECTILE DYSFUNCTION, UNSPECIFIED ERECTILE DYSFUNCTION TYPE: ICD-10-CM

## 2023-11-13 DIAGNOSIS — E66.01 MORBID OBESITY (MULTI): ICD-10-CM

## 2023-11-13 DIAGNOSIS — K21.9 GASTROESOPHAGEAL REFLUX DISEASE WITHOUT ESOPHAGITIS: ICD-10-CM

## 2023-11-13 DIAGNOSIS — R73.9 HYPERGLYCEMIA: ICD-10-CM

## 2023-11-13 NOTE — TELEPHONE ENCOUNTER
Dr Tucker pt    Pt phoned office and is requesting refill on    Bupropion  Omeprazole  Phentermine  Semaglutide  Tadalafil  Tamsulosin    [facility] [address]    Pt had a difference with his girlfriend and had to leave her house per the police and he has no meds now at all.      Also pt is wanting his lab results.

## 2023-11-14 RX ORDER — TADALAFIL 20 MG/1
20 TABLET ORAL DAILY PRN
Qty: 30 TABLET | Refills: 0 | Status: SHIPPED | OUTPATIENT
Start: 2023-11-14 | End: 2023-12-19 | Stop reason: SDUPTHER

## 2023-11-14 RX ORDER — OMEPRAZOLE 20 MG/1
20 CAPSULE, DELAYED RELEASE ORAL 2 TIMES DAILY
Qty: 90 CAPSULE | Refills: 0 | Status: SHIPPED | OUTPATIENT
Start: 2023-11-14

## 2023-11-14 RX ORDER — TAMSULOSIN HYDROCHLORIDE 0.4 MG/1
0.4 CAPSULE ORAL DAILY
Qty: 30 CAPSULE | Refills: 2 | Status: SHIPPED | OUTPATIENT
Start: 2023-11-14 | End: 2024-02-12

## 2023-11-14 RX ORDER — PHENTERMINE HYDROCHLORIDE 37.5 MG/1
37.5 TABLET ORAL DAILY
Qty: 30 TABLET | Refills: 0 | Status: SHIPPED | OUTPATIENT
Start: 2023-11-14 | End: 2023-12-19 | Stop reason: SDUPTHER

## 2023-11-14 RX ORDER — BUPROPION HYDROCHLORIDE 150 MG/1
150 TABLET, EXTENDED RELEASE ORAL 2 TIMES DAILY
Qty: 60 TABLET | Refills: 2 | Status: SHIPPED | OUTPATIENT
Start: 2023-11-14 | End: 2023-12-19 | Stop reason: SDUPTHER

## 2023-11-30 ENCOUNTER — TELEMEDICINE (OUTPATIENT)
Dept: PRIMARY CARE | Facility: CLINIC | Age: 63
End: 2023-11-30
Payer: COMMERCIAL

## 2023-11-30 VITALS — BODY MASS INDEX: 41.47 KG/M2 | HEIGHT: 69 IN | WEIGHT: 280 LBS

## 2023-11-30 DIAGNOSIS — G89.29 CHRONIC PAIN OF RIGHT KNEE: Primary | ICD-10-CM

## 2023-11-30 DIAGNOSIS — R39.15 URINARY URGENCY: ICD-10-CM

## 2023-11-30 DIAGNOSIS — E66.01 MORBID OBESITY (MULTI): ICD-10-CM

## 2023-11-30 DIAGNOSIS — R31.29 OTHER MICROSCOPIC HEMATURIA: ICD-10-CM

## 2023-11-30 DIAGNOSIS — M25.561 CHRONIC PAIN OF RIGHT KNEE: Primary | ICD-10-CM

## 2023-11-30 DIAGNOSIS — M54.16 LUMBAR RADICULOPATHY: ICD-10-CM

## 2023-11-30 PROCEDURE — 99443 PR PHYS/QHP TELEPHONE EVALUATION 21-30 MIN: CPT | Performed by: FAMILY MEDICINE

## 2023-11-30 ASSESSMENT — ENCOUNTER SYMPTOMS
SPEECH DIFFICULTY: 0
FEVER: 0
SORE THROAT: 0
DYSPHORIC MOOD: 0
CONSTITUTIONAL NEGATIVE: 1
COUGH: 0
DECREASED CONCENTRATION: 0
HEADACHES: 0
AGITATION: 0
SINUS PAIN: 0
CONSTIPATION: 0
DYSURIA: 0
STRIDOR: 0
FLANK PAIN: 0
NERVOUS/ANXIOUS: 0
SLEEP DISTURBANCE: 0
PHOTOPHOBIA: 0
DIZZINESS: 0
POLYDIPSIA: 0
APPETITE CHANGE: 0
SHORTNESS OF BREATH: 0
ADENOPATHY: 0
EYE PAIN: 0
FATIGUE: 0
RHINORRHEA: 0
POLYPHAGIA: 0
SINUS PRESSURE: 0
NECK STIFFNESS: 0
TROUBLE SWALLOWING: 0
ACTIVITY CHANGE: 0
HEMATURIA: 0
PALPITATIONS: 0
ABDOMINAL DISTENTION: 0
DIARRHEA: 0
ABDOMINAL PAIN: 0
MYALGIAS: 0
RECTAL PAIN: 0
CHEST TIGHTNESS: 0
CONFUSION: 0
BLOOD IN STOOL: 0
ARTHRALGIAS: 1
COLOR CHANGE: 0
SEIZURES: 0

## 2023-11-30 NOTE — PROGRESS NOTES
Subjective   Patient ID: Jaycee Harp is a 63 y.o. male who presents for Knee Pain and Results.    Patient is here for follow up on knee pain.    Patient states the pain is still there. Patient states have severe pain.    Patient states takes adipex and it is working. Patient states quick smoke but still feels hungry.    Patient states had done blood work on 10/12/2023 and have blood in the urine.  Patient had done XR right knee on 10/12/2023    Knee Pain   The incident occurred more than 1 week ago. The pain is present in the right knee. The quality of the pain is described as stabbing. The pain is severe. The pain has been Constant since onset.        Review of Systems   Constitutional: Negative.  Negative for activity change, appetite change, fatigue and fever.   HENT:  Negative for congestion, dental problem, ear discharge, ear pain, mouth sores, rhinorrhea, sinus pressure, sinus pain, sore throat, tinnitus and trouble swallowing.    Eyes:  Negative for photophobia, pain and visual disturbance.   Respiratory:  Negative for cough, chest tightness, shortness of breath and stridor.    Cardiovascular:  Negative for chest pain and palpitations.   Gastrointestinal:  Negative for abdominal distention, abdominal pain, blood in stool, constipation, diarrhea and rectal pain.   Endocrine: Negative for cold intolerance, heat intolerance, polydipsia, polyphagia and polyuria.   Genitourinary:  Negative for dysuria, flank pain, hematuria and urgency.   Musculoskeletal:  Positive for arthralgias. Negative for gait problem, myalgias and neck stiffness.   Skin:  Negative for color change and rash.   Allergic/Immunologic: Negative for environmental allergies and food allergies.   Neurological:  Negative for dizziness, seizures, syncope, speech difficulty and headaches.   Hematological:  Negative for adenopathy.   Psychiatric/Behavioral:  Negative for agitation, confusion, decreased concentration, dysphoric mood and sleep  "disturbance. The patient is not nervous/anxious.        Objective   Ht 1.753 m (5' 9.02\")   Wt 127 kg (280 lb)   BMI 41.32 kg/m²     Physical Exam  Constitutional: Well developed, well nourished, alert and in no acute distress   Psychiatric: Mood calm and affect normal    Telephone Visit - Audio Communication Only       Assessment/Plan   Problem List Items Addressed This Visit             ICD-10-CM    Knee pain, right - Primary M25.561    Lumbar radiculopathy M54.16    Morbid obesity (CMS/[facility]) E66.01    BMI 40.0-44.9, adult (CMS/[facility]) Z68.41    Urinary urgency R39.15    Relevant Orders    Referral to Urology    Hematuria R31.9    Relevant Orders    Referral to Urology          "

## 2023-11-30 NOTE — PATIENT INSTRUCTIONS
Follow up in 4 weeks    Continue current medications and therapy for chronic medical conditions.    Patient was advised importance of proper diet/nutrition in addition adequate hydration. Patient was encouraged moderate exercise program to include 30 minutes daily for 5 days of the week or 150 minutes weekly. Patient will follow-up with us as scheduled.    Review lab results from August 2023    Urology referral Dr. Angulo

## 2023-12-07 ENCOUNTER — APPOINTMENT (OUTPATIENT)
Dept: UROLOGY | Facility: CLINIC | Age: 63
End: 2023-12-07
Payer: COMMERCIAL

## 2023-12-19 ENCOUNTER — CLINICAL SUPPORT (OUTPATIENT)
Dept: PRIMARY CARE | Facility: CLINIC | Age: 63
End: 2023-12-19
Payer: COMMERCIAL

## 2023-12-19 VITALS — SYSTOLIC BLOOD PRESSURE: 136 MMHG | BODY MASS INDEX: 42.06 KG/M2 | WEIGHT: 285 LBS | DIASTOLIC BLOOD PRESSURE: 76 MMHG

## 2023-12-19 DIAGNOSIS — Z00.00 ROUTINE GENERAL MEDICAL EXAMINATION AT A HEALTH CARE FACILITY: ICD-10-CM

## 2023-12-19 DIAGNOSIS — F17.211 CIGARETTE NICOTINE DEPENDENCE IN REMISSION: ICD-10-CM

## 2023-12-19 DIAGNOSIS — N52.9 ERECTILE DYSFUNCTION, UNSPECIFIED ERECTILE DYSFUNCTION TYPE: ICD-10-CM

## 2023-12-19 DIAGNOSIS — E66.01 MORBID OBESITY (MULTI): ICD-10-CM

## 2023-12-19 RX ORDER — PHENTERMINE HYDROCHLORIDE 37.5 MG/1
37.5 TABLET ORAL DAILY
Qty: 30 TABLET | Refills: 0 | Status: SHIPPED | OUTPATIENT
Start: 2023-12-19

## 2023-12-19 RX ORDER — BUPROPION HYDROCHLORIDE 150 MG/1
150 TABLET, EXTENDED RELEASE ORAL 2 TIMES DAILY
Qty: 180 TABLET | Refills: 1 | Status: SHIPPED | OUTPATIENT
Start: 2023-12-19 | End: 2024-03-27 | Stop reason: SDUPTHER

## 2023-12-19 RX ORDER — TADALAFIL 20 MG/1
20 TABLET ORAL DAILY PRN
Qty: 30 TABLET | Refills: 0 | Status: SHIPPED | OUTPATIENT
Start: 2023-12-19 | End: 2024-03-27 | Stop reason: SDUPTHER

## 2023-12-19 NOTE — TELEPHONE ENCOUNTER
DR FISCHER PT  REFILL ON phentermine (Adipex-P) 37.5 mg tablet   buPROPion SR (Wellbutrin SR) 150 mg   tadalafil (Cialis) 20 mg tablet   Pharmacy    [id number] - [place], [place] - [address]

## 2024-01-18 ENCOUNTER — APPOINTMENT (OUTPATIENT)
Dept: UROLOGY | Facility: CLINIC | Age: 64
End: 2024-01-18
Payer: COMMERCIAL

## 2024-03-27 DIAGNOSIS — F17.211 CIGARETTE NICOTINE DEPENDENCE IN REMISSION: ICD-10-CM

## 2024-03-27 DIAGNOSIS — N52.9 ERECTILE DYSFUNCTION, UNSPECIFIED ERECTILE DYSFUNCTION TYPE: ICD-10-CM

## 2024-03-27 DIAGNOSIS — E66.01 MORBID OBESITY (MULTI): ICD-10-CM

## 2024-03-27 DIAGNOSIS — R73.9 HYPERGLYCEMIA: ICD-10-CM

## 2024-03-27 RX ORDER — BUPROPION HYDROCHLORIDE 150 MG/1
150 TABLET, EXTENDED RELEASE ORAL 2 TIMES DAILY
Qty: 180 TABLET | Refills: 1 | Status: SHIPPED | OUTPATIENT
Start: 2024-03-27 | End: 2024-09-23

## 2024-03-27 RX ORDER — TADALAFIL 20 MG/1
20 TABLET ORAL DAILY PRN
Qty: 30 TABLET | Refills: 0 | Status: SHIPPED | OUTPATIENT
Start: 2024-03-27 | End: 2024-04-26

## 2024-03-27 NOTE — TELEPHONE ENCOUNTER
Dr Tucker pt    Pt phoned office and is requesting refill on     Tadalafil  Bupropion    [facility]     Pt also needs samples of ozempic. Please let pt know when ready.    [phone]

## 2024-09-10 ENCOUNTER — TELEPHONE (OUTPATIENT)
Dept: PRIMARY CARE | Facility: CLINIC | Age: 64
End: 2024-09-10
Payer: MEDICARE

## 2024-09-10 NOTE — TELEPHONE ENCOUNTER
LVM FOR PT TO CALL OFFICE TO DISCUSS REQUEST FOR ACCOMODATION.    WHAT IS THE DISABILITY THAT REQUIRES THE NEED FOR AN EXTRA BEDROOM AND GRAB BARS.

## 2024-09-13 ENCOUNTER — APPOINTMENT (OUTPATIENT)
Dept: PRIMARY CARE | Facility: CLINIC | Age: 64
End: 2024-09-13

## 2024-09-13 VITALS
DIASTOLIC BLOOD PRESSURE: 74 MMHG | BODY MASS INDEX: 44.38 KG/M2 | WEIGHT: 299.6 LBS | HEART RATE: 88 BPM | SYSTOLIC BLOOD PRESSURE: 138 MMHG | TEMPERATURE: 98.4 F | HEIGHT: 69 IN

## 2024-09-13 DIAGNOSIS — R73.9 HYPERGLYCEMIA: ICD-10-CM

## 2024-09-13 DIAGNOSIS — R31.9 HEMATURIA, UNSPECIFIED TYPE: ICD-10-CM

## 2024-09-13 DIAGNOSIS — E78.5 DYSLIPIDEMIA: ICD-10-CM

## 2024-09-13 DIAGNOSIS — Z00.00 MEDICARE ANNUAL WELLNESS VISIT, INITIAL: Primary | ICD-10-CM

## 2024-09-13 DIAGNOSIS — F17.211 CIGARETTE NICOTINE DEPENDENCE IN REMISSION: ICD-10-CM

## 2024-09-13 DIAGNOSIS — I25.10 CORONARY ARTERY DISEASE INVOLVING NATIVE CORONARY ARTERY OF NATIVE HEART WITHOUT ANGINA PECTORIS: ICD-10-CM

## 2024-09-13 DIAGNOSIS — H91.93 BILATERAL HEARING LOSS, UNSPECIFIED HEARING LOSS TYPE: ICD-10-CM

## 2024-09-13 DIAGNOSIS — Z12.5 ENCOUNTER FOR PROSTATE CANCER SCREENING: ICD-10-CM

## 2024-09-13 DIAGNOSIS — I10 PRIMARY HYPERTENSION: ICD-10-CM

## 2024-09-13 DIAGNOSIS — R53.83 OTHER FATIGUE: ICD-10-CM

## 2024-09-13 DIAGNOSIS — H53.9 VISION DISTURBANCE: ICD-10-CM

## 2024-09-13 LAB
POC APPEARANCE, URINE: CLEAR
POC BILIRUBIN, URINE: ABNORMAL
POC BLOOD, URINE: ABNORMAL
POC COLOR, URINE: ABNORMAL
POC FINGERSTICK BLOOD GLUCOSE: 137 MG/DL (ref 70–100)
POC GLUCOSE, URINE: NEGATIVE MG/DL
POC HEMOGLOBIN A1C: 6.4 % (ref 4.2–6.5)
POC KETONES, URINE: NEGATIVE MG/DL
POC LEUKOCYTES, URINE: NEGATIVE
POC NITRITE,URINE: NEGATIVE
POC PH, URINE: 8.5 PH
POC PROTEIN, URINE: ABNORMAL MG/DL
POC SPECIFIC GRAVITY, URINE: 1.02
POC UROBILINOGEN, URINE: >=8 EU/DL

## 2024-09-13 PROCEDURE — 81002 URINALYSIS NONAUTO W/O SCOPE: CPT | Performed by: FAMILY MEDICINE

## 2024-09-13 PROCEDURE — 99497 ADVNCD CARE PLAN 30 MIN: CPT | Performed by: FAMILY MEDICINE

## 2024-09-13 PROCEDURE — 82962 GLUCOSE BLOOD TEST: CPT | Performed by: FAMILY MEDICINE

## 2024-09-13 PROCEDURE — 1159F MED LIST DOCD IN RCRD: CPT | Performed by: FAMILY MEDICINE

## 2024-09-13 PROCEDURE — 3078F DIAST BP <80 MM HG: CPT | Performed by: FAMILY MEDICINE

## 2024-09-13 PROCEDURE — 3008F BODY MASS INDEX DOCD: CPT | Performed by: FAMILY MEDICINE

## 2024-09-13 PROCEDURE — 1170F FXNL STATUS ASSESSED: CPT | Performed by: FAMILY MEDICINE

## 2024-09-13 PROCEDURE — 83036 HEMOGLOBIN GLYCOSYLATED A1C: CPT | Performed by: FAMILY MEDICINE

## 2024-09-13 PROCEDURE — 1158F ADVNC CARE PLAN TLK DOCD: CPT | Performed by: FAMILY MEDICINE

## 2024-09-13 PROCEDURE — 1123F ACP DISCUSS/DSCN MKR DOCD: CPT | Performed by: FAMILY MEDICINE

## 2024-09-13 PROCEDURE — G0402 INITIAL PREVENTIVE EXAM: HCPCS | Performed by: FAMILY MEDICINE

## 2024-09-13 PROCEDURE — 3075F SYST BP GE 130 - 139MM HG: CPT | Performed by: FAMILY MEDICINE

## 2024-09-13 PROCEDURE — 1160F RVW MEDS BY RX/DR IN RCRD: CPT | Performed by: FAMILY MEDICINE

## 2024-09-13 RX ORDER — LOSARTAN POTASSIUM AND HYDROCHLOROTHIAZIDE 12.5; 5 MG/1; MG/1
1 TABLET ORAL DAILY
Qty: 30 TABLET | Refills: 2 | Status: SHIPPED | OUTPATIENT
Start: 2024-09-13 | End: 2024-12-12

## 2024-09-13 RX ORDER — SEMAGLUTIDE 0.25 MG/.5ML
0.25 INJECTION, SOLUTION SUBCUTANEOUS WEEKLY
Qty: 2 ML | Refills: 0 | Status: SHIPPED | OUTPATIENT
Start: 2024-09-13 | End: 2024-09-14

## 2024-09-13 RX ORDER — VARENICLINE TARTRATE 0.5 MG/1
0.5 TABLET, FILM COATED ORAL 2 TIMES DAILY
Qty: 60 TABLET | Refills: 0 | Status: SHIPPED | OUTPATIENT
Start: 2024-09-13 | End: 2024-10-13

## 2024-09-13 RX ORDER — VARENICLINE TARTRATE 1 MG/1
1 TABLET, FILM COATED ORAL 2 TIMES DAILY
Qty: 60 TABLET | Refills: 2 | Status: SHIPPED | OUTPATIENT
Start: 2024-10-13 | End: 2025-01-11

## 2024-09-13 ASSESSMENT — ENCOUNTER SYMPTOMS
BLOOD IN STOOL: 0
FEVER: 0
RECTAL PAIN: 0
SLEEP DISTURBANCE: 0
COLOR CHANGE: 0
ABDOMINAL PAIN: 0
DEPRESSION: 0
ACTIVITY CHANGE: 0
CONSTITUTIONAL NEGATIVE: 1
TROUBLE SWALLOWING: 0
MYALGIAS: 1
NECK STIFFNESS: 0
APPETITE CHANGE: 0
ARTHRALGIAS: 1
PHOTOPHOBIA: 0
CONFUSION: 0
LOSS OF SENSATION IN FEET: 0
DECREASED CONCENTRATION: 0
SINUS PAIN: 0
CHEST TIGHTNESS: 0
CONSTIPATION: 0
FATIGUE: 0
SINUS PRESSURE: 0
FLANK PAIN: 0
DYSURIA: 0
NERVOUS/ANXIOUS: 0
DIARRHEA: 0
SPEECH DIFFICULTY: 0
RHINORRHEA: 0
COUGH: 0
STRIDOR: 0
DIZZINESS: 0
SORE THROAT: 0
EYE PAIN: 1
DYSPHORIC MOOD: 0
HEMATURIA: 0
OCCASIONAL FEELINGS OF UNSTEADINESS: 0
SEIZURES: 0
SHORTNESS OF BREATH: 0
HEADACHES: 0
PALPITATIONS: 0
POLYPHAGIA: 0
ADENOPATHY: 0
AGITATION: 0
POLYDIPSIA: 0

## 2024-09-13 ASSESSMENT — ACTIVITIES OF DAILY LIVING (ADL)
TAKING_MEDICATION: INDEPENDENT
BATHING: INDEPENDENT
DRESSING: INDEPENDENT
MANAGING_FINANCES: INDEPENDENT
GROCERY_SHOPPING: INDEPENDENT
DOING_HOUSEWORK: INDEPENDENT

## 2024-09-13 ASSESSMENT — PATIENT HEALTH QUESTIONNAIRE - PHQ9
SUM OF ALL RESPONSES TO PHQ9 QUESTIONS 1 AND 2: 0
1. LITTLE INTEREST OR PLEASURE IN DOING THINGS: NOT AT ALL
2. FEELING DOWN, DEPRESSED OR HOPELESS: NOT AT ALL

## 2024-09-13 NOTE — ASSESSMENT & PLAN NOTE
Orders:    Comprehensive Metabolic Panel; Future    CBC and Auto Differential; Future    Lipid Panel; Future

## 2024-09-13 NOTE — PROGRESS NOTES
Subjective   Reason for Visit: Jaycee Harp is an 64 y.o. male here for a Medicare Wellness visit.     Past Medical, Surgical, and Family History reviewed and updated in chart.    Reviewed all medications by prescribing practitioner or clinical pharmacist (such as prescriptions, OTCs, herbal therapies and supplements) and documented in the medical record.    HPI  Complaints of bilateral lower extremity swelling   Right leg worse than left  States the swelling worsens throughout the day  The swelling resolves when laying down overnight  Experiences tingling in the legs and feet  Occasionally he will notice a yellow fluid in his socks from leg drainage  Patient admits to heavy alcohol use including liquor and beer  States he is smoking 1.5 packs/day  Has been smoking for 53 years   Ready to quit smoking    States he was suppose to see urology in the past for hematuria. States he still sees blood in the urine.    States he fell down 2 years ago had a brain bleed and got 9 staples in his head. He experiences left sided/posterior head pain, sharp pain in left eye, and shadows.   States when he wakes up in the morning he hears his heart beat loud in his ears.    Wants toenails looked at. States the nails are real thick and discolored.    Ready to quit smoking  States he is smoke 1.5 packs/day  Has been smoking for 53 years     Patient Care Team:  Mandeep Tucker DO as PCP - [employer] PCP     Review of Systems   Constitutional: Negative.  Negative for activity change, appetite change, fatigue and fever.   HENT:  Positive for hearing loss. Negative for congestion, dental problem, ear discharge, ear pain, mouth sores, rhinorrhea, sinus pressure, sinus pain, sore throat, tinnitus and trouble swallowing.    Eyes:  Positive for pain. Negative for photophobia and visual disturbance.   Respiratory:  Negative for cough, chest tightness, shortness of breath and stridor.    Cardiovascular:  Negative for chest pain  "and palpitations.   Gastrointestinal:  Positive for abdominal distention. Negative for abdominal pain, blood in stool, constipation, diarrhea and rectal pain.   Endocrine: Negative for cold intolerance, heat intolerance, polydipsia, polyphagia and polyuria.   Genitourinary:  Negative for dysuria, flank pain, hematuria and urgency.   Musculoskeletal:  Positive for arthralgias and myalgias. Negative for gait problem and neck stiffness.   Skin:  Negative for color change and rash.   Allergic/Immunologic: Negative for environmental allergies and food allergies.   Neurological:  Negative for dizziness, seizures, syncope, speech difficulty and headaches.   Hematological:  Negative for adenopathy.   Psychiatric/Behavioral:  Negative for agitation, confusion, decreased concentration, dysphoric mood and sleep disturbance. The patient is not nervous/anxious.        Objective   Vitals:  /74 (BP Location: Right arm, Patient Position: Sitting, BP Cuff Size: Adult)   Pulse 88   Temp 36.9 °C (98.4 °F)   Ht 1.753 m (5' 9.02\")   Wt 136 kg (299 lb 9.6 oz)   BMI 44.22 kg/m²       Physical Exam  Vitals reviewed.   Constitutional:       General: He is not in acute distress.     Appearance: He is obese. He is not ill-appearing or diaphoretic.   HENT:      Head: Normocephalic.      Right Ear: Tympanic membrane and external ear normal.      Left Ear: Tympanic membrane and external ear normal.      Nose: Nose normal. No congestion.      Mouth/Throat:      Pharynx: No posterior oropharyngeal erythema.   Eyes:      General:         Right eye: No discharge.         Left eye: No discharge.      Extraocular Movements: Extraocular movements intact.      Conjunctiva/sclera: Conjunctivae normal.      Pupils: Pupils are equal, round, and reactive to light.   Cardiovascular:      Rate and Rhythm: Normal rate and regular rhythm.      Pulses: Normal pulses.      Heart sounds: Normal heart sounds. No murmur heard.  Pulmonary:      Effort: " Pulmonary effort is normal. No respiratory distress.      Breath sounds: Normal breath sounds. No wheezing or rales.   Chest:      Chest wall: No tenderness.   Abdominal:      General: Bowel sounds are normal. There is distension.      Palpations: There is no mass.      Tenderness: There is no abdominal tenderness. There is no guarding.   Musculoskeletal:         General: No tenderness. Normal range of motion.      Cervical back: Normal range of motion and neck supple. No tenderness.      Right lower leg: No edema.      Left lower leg: No edema.   Skin:     General: Skin is dry.      Coloration: Skin is not jaundiced.      Findings: No bruising, erythema or rash.   Neurological:      General: No focal deficit present.      Mental Status: He is alert and oriented to person, place, and time. Mental status is at baseline.      Cranial Nerves: No cranial nerve deficit.      Sensory: No sensory deficit.      Coordination: Coordination abnormal.      Gait: Gait abnormal.   Psychiatric:         Thought Content: Thought content normal.         Judgment: Judgment normal.      Comments: Anxious         Assessment & Plan  Hematuria, unspecified type    Orders:    POCT UA (nonautomated) manually resulted    Hyperglycemia    Orders:    POCT glycosylated hemoglobin (Hb A1C) manually resulted    POCT fingerstick glucose manually resulted    CT cardiac scoring wo IV contrast; Future    Coronary artery disease involving native coronary artery of native heart without angina pectoris    Orders:    CT cardiac scoring wo IV contrast; Future    Vision disturbance    Orders:    Referral to Ophthalmology; Future    Medicare annual wellness visit, initial         Dyslipidemia    Orders:    Comprehensive Metabolic Panel; Future    CBC and Auto Differential; Future    Lipid Panel; Future    Other fatigue    Orders:    Thyroid Stimulating Hormone; Future    Primary hypertension    Orders:    losartan-hydrochlorothiazide (Hyzaar) 50-12.5 mg  tablet; Take 1 tablet by mouth once daily.    Encounter for prostate cancer screening    Orders:    Prostate Specific Antigen, Screen; Future    Cigarette nicotine dependence in remission    Orders:    CT lung screening low dose; Future    varenicline (Chantix) 0.5 mg tablet; Take 1 tablet (0.5 mg) by mouth 2 times a day. Take with full glass of water.    varenicline (Chantix) 1 mg tablet; Take 1 tablet (1 mg) by mouth 2 times a day. Take with full glass of water. Do not fill before October 13, 2024.    Bilateral hearing loss, unspecified hearing loss type    Orders:    Hearing screen       Advance Directives Discussion  16 - 20 minutes were spent discussing Advanced Care Planning (including a Living Will, Medical Power Of , as well as specific end of life choices and/or directives). The details of that discussion were documented in Advanced Directives Discussion section of the medical record.       Scribe Attestation  By signing my name below, INilam RMA, Scribe   attest that this documentation has been prepared under the direction and in the presence of Mandeep Tucker DO.   Provider Attestation - Scribe documentation    All medical record entries made by the Scribe were at my direction and personally dictated by me. I have reviewed the chart and agree that the record accurately reflects my personal performance of the history, physical exam, discussion and plan.

## 2024-09-13 NOTE — PATIENT INSTRUCTIONS
Follow up in 10 weeks    Continue current medications and therapy for chronic medical conditions.    Patient was advised importance of proper diet/nutrition in addition adequate hydration. Patient was encouraged moderate exercise program to include 30 minutes daily for 5 days of the week or 150 minutes weekly. Patient will follow-up with us as scheduled.    Complete Medicare annual wellness physical/exam     Obtain Fasting Lipid, CMP, CBC, TSH and PSA     Start Losartan hydrochlorothiazide 50-12.5mg once daily     Start Chantix as directed 0.5 then switch to 1    Obtain CT Low dose lung      Refer to ophthalmology    Counseled on advanced directives/16 minutes    Obtain CT Cardiac Score     Hearing screen today

## 2024-09-13 NOTE — ASSESSMENT & PLAN NOTE
Orders:    POCT glycosylated hemoglobin (Hb A1C) manually resulted    POCT fingerstick glucose manually resulted    CT cardiac scoring wo IV contrast; Future

## 2024-09-13 NOTE — ASSESSMENT & PLAN NOTE
Orders:    CT lung screening low dose; Future    varenicline (Chantix) 0.5 mg tablet; Take 1 tablet (0.5 mg) by mouth 2 times a day. Take with full glass of water.    varenicline (Chantix) 1 mg tablet; Take 1 tablet (1 mg) by mouth 2 times a day. Take with full glass of water. Do not fill before October 13, 2024.

## 2024-09-14 RX ORDER — SEMAGLUTIDE 0.25 MG/.5ML
0.25 INJECTION, SOLUTION SUBCUTANEOUS WEEKLY
Qty: 2 ML | Refills: 0 | Status: SHIPPED | OUTPATIENT
Start: 2024-09-14 | End: 2024-10-06

## 2024-09-14 ASSESSMENT — ENCOUNTER SYMPTOMS: ABDOMINAL DISTENTION: 1

## 2024-09-19 DIAGNOSIS — N52.9 ERECTILE DYSFUNCTION, UNSPECIFIED ERECTILE DYSFUNCTION TYPE: ICD-10-CM

## 2024-09-19 DIAGNOSIS — K21.9 GASTROESOPHAGEAL REFLUX DISEASE WITHOUT ESOPHAGITIS: ICD-10-CM

## 2024-09-19 RX ORDER — OMEPRAZOLE 20 MG/1
20 CAPSULE, DELAYED RELEASE ORAL 2 TIMES DAILY
Qty: 90 CAPSULE | Refills: 0 | Status: SHIPPED | OUTPATIENT
Start: 2024-09-19

## 2024-09-19 RX ORDER — TADALAFIL 20 MG/1
20 TABLET ORAL DAILY PRN
Qty: 30 TABLET | Refills: 0 | Status: SHIPPED | OUTPATIENT
Start: 2024-09-19 | End: 2024-10-19

## 2024-09-19 NOTE — TELEPHONE ENCOUNTER
Patient of Dr. Tucker    Refill request     tadalafil (Cialis) 20 mg tablet 30 tablet 0 3/27/2024 4/26/2024    Sig - Route: Take 1 tablet (20 mg) by mouth once daily as needed for erectile dysfunction. - oral       Disp Refills Start End    omeprazole (PriLOSEC) 20 mg DR capsule 90 capsule 0 11/14/2023 --    Sig - Route: Take 1 capsule (20 mg) by mouth 2 times a day. Do not crush or chew. - oral        Pharmacy     [facility]/pharmacy #[id number] - [place], [place] - [address] Phone: [phone]   Fax: [phone]

## 2024-10-01 ENCOUNTER — APPOINTMENT (OUTPATIENT)
Dept: RADIOLOGY | Facility: CLINIC | Age: 64
End: 2024-10-01
Payer: MEDICARE

## 2024-10-03 ENCOUNTER — HOSPITAL ENCOUNTER (OUTPATIENT)
Dept: RADIOLOGY | Facility: CLINIC | Age: 64
Discharge: HOME | End: 2024-10-03
Payer: MEDICARE

## 2024-10-03 DIAGNOSIS — F17.211 CIGARETTE NICOTINE DEPENDENCE IN REMISSION: ICD-10-CM

## 2024-10-03 DIAGNOSIS — R50.9 FEVER, UNSPECIFIED: ICD-10-CM

## 2024-10-03 PROCEDURE — 71271 CT THORAX LUNG CANCER SCR C-: CPT

## 2024-10-07 ENCOUNTER — HOSPITAL ENCOUNTER (OUTPATIENT)
Dept: RADIOLOGY | Facility: CLINIC | Age: 64
Discharge: HOME | End: 2024-10-07
Payer: MEDICARE

## 2024-10-07 DIAGNOSIS — R91.8 LUNG NODULE, MULTIPLE: ICD-10-CM

## 2024-10-07 DIAGNOSIS — R50.9 FEVER, UNSPECIFIED: ICD-10-CM

## 2024-10-07 DIAGNOSIS — R91.1 APICAL LUNG NODULE: ICD-10-CM

## 2024-10-07 PROCEDURE — 3430000001 HC RX 343 DIAGNOSTIC RADIOPHARMACEUTICALS: Performed by: FAMILY MEDICINE

## 2024-10-07 PROCEDURE — 78815 PET IMAGE W/CT SKULL-THIGH: CPT | Mod: PI

## 2024-10-07 PROCEDURE — 78815 PET IMAGE W/CT SKULL-THIGH: CPT | Mod: PET TUMOR INIT TX STRAT | Performed by: STUDENT IN AN ORGANIZED HEALTH CARE EDUCATION/TRAINING PROGRAM

## 2024-10-07 PROCEDURE — A9552 F18 FDG: HCPCS | Performed by: FAMILY MEDICINE

## 2024-10-07 RX ORDER — FLUDEOXYGLUCOSE F 18 200 MCI/ML
11.7 INJECTION, SOLUTION INTRAVENOUS
Status: COMPLETED | OUTPATIENT
Start: 2024-10-07 | End: 2024-10-07

## 2024-10-08 ENCOUNTER — APPOINTMENT (OUTPATIENT)
Dept: PRIMARY CARE | Facility: CLINIC | Age: 64
End: 2024-10-08
Payer: MEDICARE

## 2024-10-08 ENCOUNTER — TELEMEDICINE (OUTPATIENT)
Dept: PRIMARY CARE | Facility: CLINIC | Age: 64
End: 2024-10-08
Payer: MEDICARE

## 2024-10-08 DIAGNOSIS — C34.11 MALIGNANT NEOPLASM OF UPPER LOBE OF RIGHT LUNG (MULTI): Primary | ICD-10-CM

## 2024-10-08 DIAGNOSIS — S06.6X0D: ICD-10-CM

## 2024-10-08 DIAGNOSIS — J43.2 CENTRILOBULAR EMPHYSEMA (MULTI): ICD-10-CM

## 2024-10-08 DIAGNOSIS — S06.6XAD: ICD-10-CM

## 2024-10-08 PROCEDURE — 99443 PR PHYS/QHP TELEPHONE EVALUATION 21-30 MIN: CPT | Performed by: FAMILY MEDICINE

## 2024-10-08 PROCEDURE — 1123F ACP DISCUSS/DSCN MKR DOCD: CPT | Performed by: FAMILY MEDICINE

## 2024-10-08 PROCEDURE — 1158F ADVNC CARE PLAN TLK DOCD: CPT | Performed by: FAMILY MEDICINE

## 2024-10-08 PROCEDURE — 1160F RVW MEDS BY RX/DR IN RCRD: CPT | Performed by: FAMILY MEDICINE

## 2024-10-08 PROCEDURE — 1159F MED LIST DOCD IN RCRD: CPT | Performed by: FAMILY MEDICINE

## 2024-10-08 RX ORDER — ALBUTEROL SULFATE 90 UG/1
2 INHALANT RESPIRATORY (INHALATION) EVERY 4 HOURS PRN
Qty: 8.5 G | Refills: 1 | Status: SHIPPED | OUTPATIENT
Start: 2024-10-08 | End: 2025-10-08

## 2024-10-08 ASSESSMENT — ENCOUNTER SYMPTOMS
ADENOPATHY: 0
DYSPHORIC MOOD: 0
SLEEP DISTURBANCE: 0
ABDOMINAL DISTENTION: 0
POLYDIPSIA: 0
SINUS PAIN: 0
UNEXPECTED WEIGHT CHANGE: 1
RECTAL PAIN: 0
CONSTIPATION: 0
ARTHRALGIAS: 0
HEADACHES: 0
TROUBLE SWALLOWING: 0
BLOOD IN STOOL: 0
SINUS PRESSURE: 0
CHEST TIGHTNESS: 0
MYALGIAS: 0
SORE THROAT: 0
ACTIVITY CHANGE: 0
FATIGUE: 0
PHOTOPHOBIA: 0
DIARRHEA: 0
CONFUSION: 0
NECK STIFFNESS: 0
SEIZURES: 0
COUGH: 0
COLOR CHANGE: 0
STRIDOR: 0
DIZZINESS: 0
PALPITATIONS: 0
ABDOMINAL PAIN: 0
FLANK PAIN: 0
AGITATION: 0
APPETITE CHANGE: 0
DYSURIA: 0
FEVER: 0
DECREASED CONCENTRATION: 0
HEMATURIA: 0
RHINORRHEA: 0
POLYPHAGIA: 0
SPEECH DIFFICULTY: 0
EYE PAIN: 0

## 2024-10-08 NOTE — PROGRESS NOTES
Subjective   Patient ID: Jaycee Harp is a 64 y.o. male who presents for Results and Obesity.    PHONE CALL    Consent obtained by Jaycee Harp for audio communication. Total time for this audio communication visit is 21 minutes.     Patient presents today to follow up on PET scan that was completed 10/07/2024.     Patient would like to discuss starting Adipex for weight loss. Patient has tried Wegovy but we do not currently have samples and insurance does not cover medication. States since stopping smoking 2 weeks ago he has gained weight.          Review of Systems   Constitutional:  Positive for unexpected weight change. Negative for activity change, appetite change, fatigue and fever.   HENT:  Negative for congestion, dental problem, ear discharge, ear pain, mouth sores, rhinorrhea, sinus pressure, sinus pain, sore throat, tinnitus and trouble swallowing.    Eyes:  Negative for photophobia, pain and visual disturbance.   Respiratory:  Positive for shortness of breath. Negative for cough, chest tightness and stridor.    Cardiovascular:  Negative for chest pain and palpitations.   Gastrointestinal:  Negative for abdominal distention, abdominal pain, blood in stool, constipation, diarrhea and rectal pain.   Endocrine: Negative for cold intolerance, heat intolerance, polydipsia, polyphagia and polyuria.   Genitourinary:  Negative for dysuria, flank pain, hematuria and urgency.   Musculoskeletal:  Negative for arthralgias, gait problem, myalgias and neck stiffness.   Skin:  Negative for color change and rash.   Allergic/Immunologic: Negative for environmental allergies and food allergies.   Neurological:  Negative for dizziness, seizures, syncope, speech difficulty and headaches.   Hematological:  Negative for adenopathy.   Psychiatric/Behavioral:  Negative for agitation, confusion, decreased concentration, dysphoric mood and sleep disturbance. The patient is nervous/anxious.        Objective   There were no vitals taken  for this visit.    Physical Exam  Neurological:      Mental Status: He is alert and oriented to person, place, and time.   Psychiatric:         Behavior: Behavior normal.         Thought Content: Thought content normal.       Constitutional: Well developed, well nourished, alert and in no acute distress   Psychiatric: Mood calm and affect normal    Telephone Visit - Audio Communication Only      Assessment/Plan   Problem List Items Addressed This Visit             ICD-10-CM    Traumatic intracranial subarachnoid hemorrhage with loss of consciousness, subsequent encounter S06.6X0D     Stable/improved         Malignant neoplasm of upper lobe of right lung (Multi) - Primary C34.11    Relevant Orders    Referral to Thoracic Surgery     Other Visit Diagnoses         Codes    Centrilobular emphysema (Multi)     J43.2    Relevant Medications    albuterol (ProAir HFA) 90 mcg/actuation inhaler

## 2024-10-09 NOTE — PATIENT INSTRUCTIONS
Follow up in 10 days    Continue current medications and therapy for chronic medical conditions.    Patient was advised importance of proper diet/nutrition in addition adequate hydration. Patient was encouraged moderate exercise program to include 30 minutes daily for 5 days of the week or 150 minutes weekly. Patient will follow-up with us as scheduled.    I personally reviewed the OARRS report for this patient. I have considered the risks of abuse, dependence, addiction, and diversion.    UDS/CSA:    Review CT scan of the chest    Start albuterol inhaler 2 puffs every 6 hours as needed    Refer to thoracic surgeon

## 2024-10-10 ENCOUNTER — TELEPHONE (OUTPATIENT)
Dept: PRIMARY CARE | Facility: CLINIC | Age: 64
End: 2024-10-10

## 2024-10-10 ENCOUNTER — APPOINTMENT (OUTPATIENT)
Dept: PULMONOLOGY | Facility: CLINIC | Age: 64
End: 2024-10-10
Payer: MEDICARE

## 2024-10-10 DIAGNOSIS — F17.211 CIGARETTE NICOTINE DEPENDENCE IN REMISSION: ICD-10-CM

## 2024-10-10 DIAGNOSIS — D61.818 PANCYTOPENIA: ICD-10-CM

## 2024-10-10 DIAGNOSIS — I10 PRIMARY HYPERTENSION: ICD-10-CM

## 2024-10-10 RX ORDER — VARENICLINE TARTRATE 0.5 MG/1
0.5 TABLET, FILM COATED ORAL 2 TIMES DAILY
Qty: 180 TABLET | Refills: 0 | Status: SHIPPED | OUTPATIENT
Start: 2024-10-10

## 2024-10-10 RX ORDER — LOSARTAN POTASSIUM AND HYDROCHLOROTHIAZIDE 12.5; 5 MG/1; MG/1
1 TABLET ORAL DAILY
Qty: 90 TABLET | Refills: 0 | Status: SHIPPED | OUTPATIENT
Start: 2024-10-10

## 2024-10-10 NOTE — TELEPHONE ENCOUNTER
Dr. Tucker patient    Patient said he discussed starting Adipex because his insurance does not cover Wegovy. Patient says the pharmacy did not receive script.    Pharmacy: [facility] in [place] on [address]

## 2024-10-10 NOTE — PROGRESS NOTES
HPI:   Jaycee Harp is a 64 y.o. male with a pmhx of CAD, HLD, HTN, morbid obesity, SAH, and former smoker who is referred to me by Dr Tucker for evaluation and treatment of lung nodule. Lung nodule was found on screening LDCT and followed by PET-CT. He initially presented to PCP for severe SOB. He was prescribed with Chantix and he quit smoking 3 weeks ago. He endorsed his breathing is much improved. He still have some dyspnea on exertion. He has a smoking history of 20 year with at least 1.5 -2 pack per day.  He has been gaining some weight. He felt fatigued easily. He denied history of MI or stroke. He had a history of SAH 2-3 years ago with some residual headache without any neurological deficits. He endorse asbestosis exposure in the past. He denied prior TB exposure. He was a heavy ETOH use but has been sober for one month.    PMHx: per HPI  PSHx: rectal abscess  SHx: former smoker (30-40ppy quit 3 week ago), deny current ETOH, or illicit drugs, working as a ,  FMHx: father has TB and colon cancer, mother has cirrhosis and DM, uncle has lung cancer    Current Outpatient Medications:     albuterol (ProAir HFA) 90 mcg/actuation inhaler, Inhale 2 puffs every 4 hours if needed for wheezing or shortness of breath., Disp: 8.5 g, Rfl: 1    losartan-hydrochlorothiazide (Hyzaar) 50-12.5 mg tablet, TAKE 1 TABLET BY MOUTH EVERY DAY, Disp: 90 tablet, Rfl: 0    omeprazole (PriLOSEC) 20 mg DR capsule, Take 1 capsule (20 mg) by mouth 2 times a day. Do not crush or chew., Disp: 90 capsule, Rfl: 0    tadalafil (Cialis) 20 mg tablet, Take 1 tablet (20 mg) by mouth once daily as needed for erectile dysfunction., Disp: 30 tablet, Rfl: 0    varenicline (Chantix) 0.5 mg tablet, Take 1 tablet (0.5 mg) by mouth 2 times a day. Take with full glass of water., Disp: 180 tablet, Rfl: 0    semaglutide, weight loss, (Wegovy) 0.25 mg/0.5 mL pen injector, INJECT 0.25 MG UNDER THE SKIN 1 (ONE) TIME PER WEEK FOR 4 DOSES., Disp: 2 mL,  Rfl: 0    [START ON 10/13/2024] varenicline (Chantix) 1 mg tablet, Take 1 tablet (1 mg) by mouth 2 times a day. Take with full glass of water. Do not fill before October 13, 2024., Disp: 60 tablet, Rfl: 2   No Known Allergies       ROS  General: fatigue, negative for fever, chills, weight loss, night sweat  Head: negative for severe headache, vision change, blurred vision,   CV: negative for chest pain, dizziness, lightheadedness   Pulm: dyspnea on exertion, negative for shortness of breath, hemoptysis  GI: negative for diarrhea, constipation, abdominal pain, nausea or vomiting, BRBPR  : negative for dysuria, hematuria, incontinence  Skin: negative for rash  Heme: negative for blood thinner, bleeding disorder or clotting disorder  Endo: negative for heat or cold intolerance, weight gain or weight loss  MSK: negative for rash, edema, weakness    PHYSICAL EXAM  Visit Vitals  /69   Pulse 95   Temp 36.1 °C (97 °F) (Temporal)   Constitution: well-developed well-nourished male sitting in chair in no acute distress  HEENT: NCAT, moist mucosal membrane, neck supple, no crepitus, sclera anicteric  Lymph nodes: no cervical or supraclavicular lymphadenopathy  Cardiac: RRR, normal S1, S2, no mrg  Pulmonary: normal air movement, CTAP, no wcr  Abdomen: soft, non-distended, non-tender, no rigidity, guarding or rebound tenderness, no splenohepatomegaly  Neuro: AOx3, CNII-XII grossly normal  Ext: warm, dry, no edema noted  Skin: dry, clean and intact  Psych: mood and affect wnl    Assessment and Plan:  This is a 64 y.o. male former smoker with lung nodule   I reviewed the CT scan from 10/3/24 and the PET/CT from 10/7/24. It showed right upper lobe posterior solid nodule about 16 to 17 mm in size.  This nodule was PET avid with max SUV of 4.9.  There is no mediastinal lymphadenopathy or activity on PET/CT.  There was a cavitary lesion in the left lower lobe posteriorly about 30 mm in size.  This lesion was not PET avid.    In  my opinion, this is high risk patient was newly identified PET avid solid pulmonary nodule which is highly concerning for primary lung cancer. Given his MAYEN and SOB, I will obtain a pulmonary function test. If his lung function is reasonable, I would recommend a minimal invasive wedge resection for diagnostic and therapeutic purpose. If his lung function is poor, I will recommend SBRT.     I had a candid discussion with the patient and his wife. I outlined the treatment plan as above. The patient consented to proceed.     Michael Garcia MD  Thoracic Surgeon  [facility]  [facility]  Office phone: [phone]  Fax: [phone]  Pager: [phone]    Amount of time spent:  -Prep time on date of patient encounter: 30 min  -Time spend with patient/family/caregiver: 30 min  -Additional time spent on patient care activities: 10 min  -Documentation time in medical record: 15 min  -Other time spent on date of patient encounter: 0 min  Total time: 85 min

## 2024-10-10 NOTE — PROGRESS NOTES
Patient: Jaycee Harp    [id number]  : 1960 -- AGE 64 y.o.    Provider: LALO Allison     Location [facility]   Service Date: 10/16/2024              [facility]  New Visit Note      HISTORY OF PRESENT ILLNESS     The patient's referring provider is: Mandeep Tucker DO    HISTORY OF PRESENT ILLNESS   Jaycee Harp is a 64 y.o. male who presents to a [facility] for an evaluation with concerns of No chief complaint on file.. I have independently interviewed and examined the patient in the office and reviewed available records.    Current History    On today's visit, the patient reports he felt short of breath walking short distances, he could not breath while sleeping laying down, now sleeping in the chair.  He Smoked for about 20 years x 1.5ppd, stopped when he was incarcerated for 20 years  At baseline,  has dyspnea on exertion, but none at rest. He is a  other than work he is sedentary. Has to stop for breath after walking about 100 meters or a few minutes (mMRC 3). Has orthopnea while sleeping supine, has dominick, had fluid seeping.  Has gained X 30 pounds in the last X 12 months.   Relates dry chronic cough with wite secretions , c/o wheezing, and denies green, blood streaks sputum. No night cough. No hemoptysis. No fever or shivering chills. Has a runny nose, and a tingling sensation in the back of his throat. Also complains of heartburn - on a PPI. Denies chest pain    He has had hematuria for the past year and needs to follow-up with urology    Previous pulmonary history:  no history of recurrent infections, or lung disease as a child.  No previous lung hx, never on oxygen or inhaler therapy.     Inhalers/nebulized medications: albuterol - not used    Hospitalization History: Not been hospitalized over the last year for breathing related problem.    Sleep history:  Complains of snoring, apnea, feeling  tired during the day, and taking naps during the day.   Has RISHABH and cannot tolerate machine     ALLERGIES AND MEDICATIONS     ALLERGIES  No Known Allergies    MEDICATIONS  Current Outpatient Medications   Medication Sig Dispense Refill    albuterol (ProAir HFA) 90 mcg/actuation inhaler Inhale 2 puffs every 4 hours if needed for wheezing or shortness of breath. 8.5 g 1    losartan-hydrochlorothiazide (Hyzaar) 50-12.5 mg tablet TAKE 1 TABLET BY MOUTH EVERY DAY 90 tablet 0    omeprazole (PriLOSEC) 20 mg DR capsule Take 1 capsule (20 mg) by mouth 2 times a day. Do not crush or chew. 90 capsule 0    phentermine (Adipex-P) 37.5 mg tablet Take 1 tablet (37.5 mg) by mouth once daily in the morning. Take before meals. 30 tablet 0    semaglutide, weight loss, (Wegovy) 0.25 mg/0.5 mL pen injector INJECT 0.25 MG UNDER THE SKIN 1 (ONE) TIME PER WEEK FOR 4 DOSES. 2 mL 0    tadalafil (Cialis) 20 mg tablet Take 1 tablet (20 mg) by mouth once daily as needed for erectile dysfunction. 30 tablet 0    varenicline (Chantix) 0.5 mg tablet Take 1 tablet (0.5 mg) by mouth 2 times a day. Take with full glass of water. 180 tablet 0    varenicline (Chantix) 1 mg tablet Take 1 tablet (1 mg) by mouth 2 times a day. Take with full glass of water. Do not fill before October 13, 2024. 60 tablet 2     No current facility-administered medications for this visit.         PAST HISTORY     PAST MEDICAL HISTORY  He  has a past medical history of Contact with and (suspected) exposure to covid-19 (08/28/2020), Hematuria (10/12/2023), Personal history of other endocrine, nutritional and metabolic disease (01/13/2021), and Personal history of other specified conditions (08/28/2020).  New leukopenia, pantocytopenia     PAST SURGICAL HISTORY  Past Surgical History:   Procedure Laterality Date    OTHER SURGICAL HISTORY  10/31/2019    Rectal surgery       IMMUNIZATION HISTORY  Immunization History   Administered Date(s) Administered    Tdap vaccine, age 7 year  and older (BOOSTRIX, ADACEL) 09/15/2021       SOCIAL HISTORY  He  reports that he quit smoking about 53 years ago. His smoking use included cigarettes. He started smoking about 3 weeks ago. He has been exposed to tobacco smoke. He has quit using smokeless tobacco. He reports current alcohol use of about 24.0 standard drinks of alcohol per week. He reports that he does not use drugs. He Patient   Patient admits to heavy alcohol use including liquor and beer  States he is smoking 1.5 packs/day  Has been smoking for 53 years   Ready to quit smoking - 3 weeks ago     OCCUPATIONAL/ENVIRONMENTAL HISTORY  Currently works as:  at [employer] - known exposure to asbestos   DOES/DOES NOT EC: does have known exposure to asbestos, silica, beryllium or inhaled metals.  DOES/DOES NOT EC: does not have exposure to birds or exotic animals- 2 dogs and 2 cats     FAMILY HISTORY  Family History   Problem Relation Name Age of Onset    Diabetes Mother      Other (varicose veins) Mother      Liver cancer Mother      Diabetes Father      Tuberculosis Father       DOES/DOES NOT EC: does have a family history of pulmonary disease. Paternal uncle had lung cancer, father had asthma paternal grandmother had asthma   DOES/DOES NOT EC: does have a family history of cancer.   DOES/DOES NOT EC: does not have a family history of autoimmune disorders.    RESULTS/DATA     Pulmonary Function Test Results        No testing done       Chest Radiograph     XR CHEST 1 VIEW 09/15/2021  Impression  Borderline cardiomegaly with appearance of pulmonary vascular  congestion.      Chest CT Scan     CT lung screening low dose 10/03/2024    Impression  1. 1.7 cm pleural-based nodule in the right upper lobe which is very  suspicious for malignancy. Recommend thoracic surgery consultation as  well as PET-CT and/or tissue sampling for further evaluation.  2. Mild upper lung predominant emphysema and smoking related  interstitial changes.  3. Mild coronary artery  "calcifications.  4. Hepatic steatosis. Correlate with liver function tests.  5. Mildly dilated main pulmonary artery which can be seen with  pulmonary hypertension.    ----------------------------------------------------------    NM PET CT LUNG SPN; 10/7/2024       IMPRESSION:  1. FDG avid pleural-based nodule in the right upper lobe concerning  for primary lung neoplasm.  2. Mild FDG avidity along the periphery paraseptal emphysematous  changes within the left lower lobe, favored to represent atelectasis.  3. No other concerning FDG avid disease identified.      Echocardiogram     No testing done          REVIEW OF SYSTEMS     REVIEW OF SYSTEMS  Review of Systems   Constitutional:  Positive for activity change and fatigue.   Genitourinary:  Positive for hematuria.   Hematological:  Bruises/bleeds easily.         PHYSICAL EXAM     VITAL SIGNS: There were no vitals taken for this visit. /72 (BP Location: Right arm, Patient Position: Sitting, BP Cuff Size: Adult)   Pulse 100   Temp 36.6 °C (97.9 °F) (Temporal)   Resp 18   Ht 1.753 m (5' 9\")   Wt 134 kg (295 lb)   SpO2 93%   BMI 43.56 kg/m²      CURRENT WEIGHT: [unfilled]  BMI: [unfilled]  PREVIOUS WEIGHTS:  Wt Readings from Last 3 Encounters:   10/11/24 136 kg (299 lb)   09/13/24 136 kg (299 lb 9.6 oz)   12/19/23 129 kg (285 lb)       Physical Exam  Constitutional:       Appearance: Normal appearance. He is obese.   HENT:      Head: Normocephalic and atraumatic.      Right Ear: External ear normal.      Left Ear: External ear normal.      Nose: Nose normal.      Mouth/Throat:      Mouth: Mucous membranes are moist.      Pharynx: Oropharynx is clear.   Eyes:      Extraocular Movements: Extraocular movements intact.      Conjunctiva/sclera: Conjunctivae normal.      Pupils: Pupils are equal, round, and reactive to light.   Cardiovascular:      Rate and Rhythm: Regular rhythm. Tachycardia present.      Pulses: Normal pulses.      Heart sounds: Normal heart " sounds.   Pulmonary:      Effort: Pulmonary effort is normal.      Breath sounds: Wheezing present.   Abdominal:      General: Bowel sounds are normal.      Palpations: Abdomen is soft.   Musculoskeletal:         General: Normal range of motion.      Cervical back: Normal range of motion and neck supple.   Skin:     General: Skin is warm and dry.   Neurological:      General: No focal deficit present.      Mental Status: He is alert and oriented to person, place, and time. Mental status is at baseline.   Psychiatric:         Mood and Affect: Mood normal.         Behavior: Behavior normal.         Thought Content: Thought content normal.         Judgment: Judgment normal.         ASSESSMENT/PLAN     Mr. Harp is a 64 y.o. male and  has a past medical history of Contact with and (suspected) exposure to covid-19 (08/28/2020), Hematuria (10/12/2023), Personal history of other endocrine, nutritional and metabolic disease (01/13/2021), and Personal history of other specified conditions (08/28/2020). He was referred to the [facility] for evaluation of current smoker with dyspnea     Problem List and Orders      Assessment and Plan / Recommendations:  Problem List Items Addressed This Visit       Cigarette nicotine dependence in remission             FDG avid pleural-based right upper lobe nodule identified on recent  chest CT with maximum SUV of 4.9.   - seen by thoracic surgery - possible removal vs sbrt  - May need bronchoscopy with biopsy if thoracic surgery not pursuing surgery, awaiting PFTs  - Lab work prior to procedure   - Not on any anticoagulation - but plts 37 - has pancytopenia and referred to Hematology    Former smoker - 35 pack year history/dyspnea   - Obtain pulmonary function test, FENO   - start ICS/Laba/Lama Breztri 2 puffs twice a day, rinse and spit - samples given       Dyspnea multifactorial 2/2 anemia and emphysema   Hypoxia: Presents to clinic today with O2 sat  92_% on room air. Oxywalk completed. _82_% ambulating on room air dropped after 1 min 45 sec, 3__L O2 applied and  was __95-96__%.    Will order __3__ L nasal cannula at rest, 4L with activity   Contacted [facility] to bring O2 tank for patient.   Patient needs home Oxygen concentrator  Needs portable O2 concentrator with conserving device  Diagnosis hypoxia       Pancytopenia - referred to Hematology has upcoming appointment with Dr. Beebe   - discussed if has uncontrolled bleeding needs to go to the ER as platelets are low    Hematuria - F/U with Urology     I explained the procedure to the patient. We discussed that the bronchoscopy will be performed by a member of the Interventional Pulmonary Team; depending on scheduling and provider availability. We also discussed that the IP providers function as a team and not infrequently may have to fill in for one another if there are emergent issues that need attention at the same time. The patient / family expressed understanding and agreed to proceed. All questions were answered.     I personally spent 60 minutes today (exclusive of procedures) providing care for this patient, including preparation, face to face time, EMR documentation and other services such as review of medical records, diagnostic results, patient education, counseling, and coordination of care.        Thank you for visiting the Pulmonary clinic today!       Return to clinic after 4 weeks and after PFTs  or sooner if needed   Hattie García CNP  My office number is [phone] -     Call to schedule  for radiology - CT scans/PFTs etc at  [phone]  General scheduling  [phone]     Best way to get a hold of me is to call my office --> Please do not send me follow my health messages  Any test results will be discussed at next visit -- please make sure to make a follow up appt after testing.

## 2024-10-11 ENCOUNTER — TELEMEDICINE (OUTPATIENT)
Dept: PRIMARY CARE | Facility: CLINIC | Age: 64
End: 2024-10-11
Payer: MEDICARE

## 2024-10-11 ENCOUNTER — LAB (OUTPATIENT)
Dept: LAB | Facility: LAB | Age: 64
End: 2024-10-11
Payer: MEDICARE

## 2024-10-11 ENCOUNTER — OFFICE VISIT (OUTPATIENT)
Dept: SURGERY | Facility: CLINIC | Age: 64
End: 2024-10-11
Payer: MEDICARE

## 2024-10-11 VITALS
BODY MASS INDEX: 44.13 KG/M2 | SYSTOLIC BLOOD PRESSURE: 126 MMHG | WEIGHT: 299 LBS | HEART RATE: 95 BPM | DIASTOLIC BLOOD PRESSURE: 69 MMHG | TEMPERATURE: 97 F

## 2024-10-11 DIAGNOSIS — D61.818 PANCYTOPENIA: ICD-10-CM

## 2024-10-11 DIAGNOSIS — R79.9 ABNORMAL FINDING OF BLOOD CHEMISTRY, UNSPECIFIED: ICD-10-CM

## 2024-10-11 DIAGNOSIS — E78.5 DYSLIPIDEMIA: ICD-10-CM

## 2024-10-11 DIAGNOSIS — R91.1 LUNG NODULE: Primary | ICD-10-CM

## 2024-10-11 DIAGNOSIS — Z12.5 ENCOUNTER FOR PROSTATE CANCER SCREENING: ICD-10-CM

## 2024-10-11 DIAGNOSIS — R53.83 OTHER FATIGUE: ICD-10-CM

## 2024-10-11 DIAGNOSIS — C34.11 MALIGNANT NEOPLASM OF UPPER LOBE OF RIGHT LUNG (MULTI): ICD-10-CM

## 2024-10-11 LAB
ALBUMIN SERPL BCP-MCNC: 4 G/DL (ref 3.4–5)
ALP SERPL-CCNC: 67 U/L (ref 33–136)
ALT SERPL W P-5'-P-CCNC: 37 U/L (ref 10–52)
ANION GAP SERPL CALC-SCNC: 12 MMOL/L (ref 10–20)
AST SERPL W P-5'-P-CCNC: 28 U/L (ref 9–39)
BASO STIPL BLD QL SMEAR: PRESENT
BASOPHILS # BLD MANUAL: 0 X10*3/UL (ref 0–0.1)
BASOPHILS NFR BLD MANUAL: 0 %
BILIRUB SERPL-MCNC: 0.7 MG/DL (ref 0–1.2)
BUN SERPL-MCNC: 13 MG/DL (ref 6–23)
CALCIUM SERPL-MCNC: 8.5 MG/DL (ref 8.6–10.3)
CHLORIDE SERPL-SCNC: 101 MMOL/L (ref 98–107)
CHOLEST SERPL-MCNC: 120 MG/DL (ref 0–199)
CHOLESTEROL/HDL RATIO: 3
CO2 SERPL-SCNC: 30 MMOL/L (ref 21–32)
CREAT SERPL-MCNC: 1.08 MG/DL (ref 0.5–1.3)
DACRYOCYTES BLD QL SMEAR: ABNORMAL
EGFRCR SERPLBLD CKD-EPI 2021: 76 ML/MIN/1.73M*2
EOSINOPHIL # BLD MANUAL: 0.04 X10*3/UL (ref 0–0.7)
EOSINOPHIL NFR BLD MANUAL: 2 %
ERYTHROCYTE [DISTWIDTH] IN BLOOD BY AUTOMATED COUNT: 14.1 % (ref 11.5–14.5)
GLUCOSE SERPL-MCNC: 107 MG/DL (ref 74–99)
HCT VFR BLD AUTO: 27.4 % (ref 41–52)
HDLC SERPL-MCNC: 40.6 MG/DL
HGB BLD-MCNC: 9.3 G/DL (ref 13.5–17.5)
IMM GRANULOCYTES # BLD AUTO: 0 X10*3/UL (ref 0–0.7)
IMM GRANULOCYTES NFR BLD AUTO: 0 % (ref 0–0.9)
LDLC SERPL CALC-MCNC: 57 MG/DL
LYMPHOCYTES # BLD MANUAL: 1.26 X10*3/UL (ref 1.2–4.8)
LYMPHOCYTES NFR BLD MANUAL: 70 %
MCH RBC QN AUTO: 36.3 PG (ref 26–34)
MCHC RBC AUTO-ENTMCNC: 33.9 G/DL (ref 32–36)
MCV RBC AUTO: 107 FL (ref 80–100)
MONOCYTES # BLD MANUAL: 0.05 X10*3/UL (ref 0.1–1)
MONOCYTES NFR BLD MANUAL: 3 %
NEUTS SEG # BLD MANUAL: 0.41 X10*3/UL (ref 1.2–7)
NEUTS SEG NFR BLD MANUAL: 23 %
NON HDL CHOLESTEROL: 79 MG/DL (ref 0–149)
NRBC BLD-RTO: 2.2 /100 WBCS (ref 0–0)
OVALOCYTES BLD QL SMEAR: ABNORMAL
PATH REVIEW-CBC DIFFERENTIAL: NORMAL
PLATELET # BLD AUTO: 37 X10*3/UL (ref 150–450)
POLYCHROMASIA BLD QL SMEAR: ABNORMAL
POTASSIUM SERPL-SCNC: 4 MMOL/L (ref 3.5–5.3)
PROT SERPL-MCNC: 7.3 G/DL (ref 6.4–8.2)
PSA SERPL-MCNC: 0.57 NG/ML
RBC # BLD AUTO: 2.56 X10*6/UL (ref 4.5–5.9)
RBC MORPH BLD: ABNORMAL
SODIUM SERPL-SCNC: 139 MMOL/L (ref 136–145)
TOTAL CELLS COUNTED BLD: 100
TRIGL SERPL-MCNC: 110 MG/DL (ref 0–149)
TSH SERPL-ACNC: 1.94 MIU/L (ref 0.44–3.98)
VARIANT LYMPHS # BLD MANUAL: 0.04 X10*3/UL (ref 0–0.5)
VARIANT LYMPHS NFR BLD: 2 %
VLDL: 22 MG/DL (ref 0–40)
WBC # BLD AUTO: 1.8 X10*3/UL (ref 4.4–11.3)

## 2024-10-11 PROCEDURE — 1159F MED LIST DOCD IN RCRD: CPT | Performed by: STUDENT IN AN ORGANIZED HEALTH CARE EDUCATION/TRAINING PROGRAM

## 2024-10-11 PROCEDURE — 99442 PR PHYS/QHP TELEPHONE EVALUATION 11-20 MIN: CPT | Performed by: FAMILY MEDICINE

## 2024-10-11 PROCEDURE — 1126F AMNT PAIN NOTED NONE PRSNT: CPT | Performed by: STUDENT IN AN ORGANIZED HEALTH CARE EDUCATION/TRAINING PROGRAM

## 2024-10-11 PROCEDURE — 80061 LIPID PANEL: CPT

## 2024-10-11 PROCEDURE — 36415 COLL VENOUS BLD VENIPUNCTURE: CPT

## 2024-10-11 PROCEDURE — 99215 OFFICE O/P EST HI 40 MIN: CPT | Performed by: STUDENT IN AN ORGANIZED HEALTH CARE EDUCATION/TRAINING PROGRAM

## 2024-10-11 PROCEDURE — 85027 COMPLETE CBC AUTOMATED: CPT

## 2024-10-11 PROCEDURE — 84443 ASSAY THYROID STIM HORMONE: CPT

## 2024-10-11 PROCEDURE — 99205 OFFICE O/P NEW HI 60 MIN: CPT | Performed by: STUDENT IN AN ORGANIZED HEALTH CARE EDUCATION/TRAINING PROGRAM

## 2024-10-11 PROCEDURE — 1123F ACP DISCUSS/DSCN MKR DOCD: CPT | Performed by: STUDENT IN AN ORGANIZED HEALTH CARE EDUCATION/TRAINING PROGRAM

## 2024-10-11 PROCEDURE — 85007 BL SMEAR W/DIFF WBC COUNT: CPT

## 2024-10-11 PROCEDURE — 1036F TOBACCO NON-USER: CPT | Performed by: STUDENT IN AN ORGANIZED HEALTH CARE EDUCATION/TRAINING PROGRAM

## 2024-10-11 PROCEDURE — 80053 COMPREHEN METABOLIC PANEL: CPT

## 2024-10-11 PROCEDURE — G0103 PSA SCREENING: HCPCS

## 2024-10-11 RX ORDER — PHENTERMINE HYDROCHLORIDE 37.5 MG/1
37.5 TABLET ORAL
Qty: 30 TABLET | Refills: 0 | Status: SHIPPED | OUTPATIENT
Start: 2024-10-11

## 2024-10-11 ASSESSMENT — ENCOUNTER SYMPTOMS
CHEST TIGHTNESS: 0
DECREASED CONCENTRATION: 0
NECK STIFFNESS: 0
ADENOPATHY: 0
DYSURIA: 0
SPEECH DIFFICULTY: 0
TROUBLE SWALLOWING: 0
ABDOMINAL PAIN: 0
SEIZURES: 0
HEMATURIA: 0
FATIGUE: 0
RECTAL PAIN: 0
ABDOMINAL DISTENTION: 0
COUGH: 0
POLYDIPSIA: 0
NERVOUS/ANXIOUS: 0
FEVER: 0
PHOTOPHOBIA: 0
CONSTITUTIONAL NEGATIVE: 1
EYE PAIN: 0
FLANK PAIN: 0
DIARRHEA: 0
ACTIVITY CHANGE: 0
RHINORRHEA: 0
ARTHRALGIAS: 0
AGITATION: 0
SINUS PAIN: 0
DYSPHORIC MOOD: 0
DIZZINESS: 0
HEADACHES: 0
CONSTIPATION: 0
PALPITATIONS: 0
APPETITE CHANGE: 0
SINUS PRESSURE: 0
SORE THROAT: 0
SLEEP DISTURBANCE: 0
BLOOD IN STOOL: 0
POLYPHAGIA: 0
MYALGIAS: 0
SHORTNESS OF BREATH: 0
STRIDOR: 0
CONFUSION: 0
COLOR CHANGE: 0

## 2024-10-11 ASSESSMENT — PAIN SCALES - GENERAL: PAINLEVEL: 0-NO PAIN

## 2024-10-11 NOTE — LETTER
October 11, 2024     Mandeep Tucker DO  [address] [place] [address]    Patient: Jaycee Harp   YOB: 1960   Date of Visit: 10/11/2024       Dear Dr. Mandeep Tucker, :    Thank you for referring Jaycee Harp to me for evaluation. I am concerned that his RUL nodule represents a primary lung cancer. I will schedule him for a pulmonary function test and decide treatment option afterwards. If you have questions, please do not hesitate to call me. Thank you for involving me in the care of this patient.       Sincerely,     Michael Garcia MD  [phone]    CC: No Recipients  ______________________________________________________________________________________    HPI:   Jaycee Harp is a 64 y.o. male with a pmhx of CAD, HLD, HTN, morbid obesity, SAH, and former smoker who is referred to me by Dr Tucker for evaluation and treatment of lung nodule. Lung nodule was found on screening LDCT and followed by PET-CT. He initially presented to PCP for severe SOB. He was prescribed with Chantix and he quit smoking 3 weeks ago. He endorsed his breathing is much improved. He still have some dyspnea on exertion. He has a smoking history of 20 year with at least 1.5 -2 pack per day.  He has been gaining some weight. He felt fatigued easily. He denied history of MI or stroke. He had a history of SAH 2-3 years ago with some residual headache without any neurological deficits. He endorse asbestosis exposure in the past. He denied prior TB exposure. He was a heavy ETOH use but has been sober for one month.    PMHx: per HPI  PSHx: rectal abscess  SHx: former smoker (30-40ppy quit 3 week ago), deny current ETOH, or illicit drugs, working as a ,  FMHx: father has TB and colon cancer, mother has cirrhosis and DM, uncle has lung cancer    Current Outpatient Medications:   •  albuterol (ProAir HFA) 90 mcg/actuation inhaler, Inhale 2 puffs every 4 hours if needed for wheezing or shortness of breath., Disp: 8.5 g,  Rfl: 1  •  losartan-hydrochlorothiazide (Hyzaar) 50-12.5 mg tablet, TAKE 1 TABLET BY MOUTH EVERY DAY, Disp: 90 tablet, Rfl: 0  •  omeprazole (PriLOSEC) 20 mg DR capsule, Take 1 capsule (20 mg) by mouth 2 times a day. Do not crush or chew., Disp: 90 capsule, Rfl: 0  •  tadalafil (Cialis) 20 mg tablet, Take 1 tablet (20 mg) by mouth once daily as needed for erectile dysfunction., Disp: 30 tablet, Rfl: 0  •  varenicline (Chantix) 0.5 mg tablet, Take 1 tablet (0.5 mg) by mouth 2 times a day. Take with full glass of water., Disp: 180 tablet, Rfl: 0  •  semaglutide, weight loss, (Wegovy) 0.25 mg/0.5 mL pen injector, INJECT 0.25 MG UNDER THE SKIN 1 (ONE) TIME PER WEEK FOR 4 DOSES., Disp: 2 mL, Rfl: 0  •  [START ON 10/13/2024] varenicline (Chantix) 1 mg tablet, Take 1 tablet (1 mg) by mouth 2 times a day. Take with full glass of water. Do not fill before October 13, 2024., Disp: 60 tablet, Rfl: 2   No Known Allergies       ROS  General: fatigue, negative for fever, chills, weight loss, night sweat  Head: negative for severe headache, vision change, blurred vision,   CV: negative for chest pain, dizziness, lightheadedness   Pulm: dyspnea on exertion, negative for shortness of breath, hemoptysis  GI: negative for diarrhea, constipation, abdominal pain, nausea or vomiting, BRBPR  : negative for dysuria, hematuria, incontinence  Skin: negative for rash  Heme: negative for blood thinner, bleeding disorder or clotting disorder  Endo: negative for heat or cold intolerance, weight gain or weight loss  MSK: negative for rash, edema, weakness    PHYSICAL EXAM  Visit Vitals  /69   Pulse 95   Temp 36.1 °C (97 °F) (Temporal)   Constitution: well-developed well-nourished male sitting in chair in no acute distress  HEENT: NCAT, moist mucosal membrane, neck supple, no crepitus, sclera anicteric  Lymph nodes: no cervical or supraclavicular lymphadenopathy  Cardiac: RRR, normal S1, S2, no mrg  Pulmonary: normal air movement, CTAP, no  wcr  Abdomen: soft, non-distended, non-tender, no rigidity, guarding or rebound tenderness, no splenohepatomegaly  Neuro: AOx3, CNII-XII grossly normal  Ext: warm, dry, no edema noted  Skin: dry, clean and intact  Psych: mood and affect wnl    Assessment and Plan:  This is a 64 y.o. male former smoker with lung nodule   I reviewed the CT scan from 10/3/24 and the PET/CT from 10/7/24. It showed right upper lobe posterior solid nodule about 16 to 17 mm in size.  This nodule was PET avid with max SUV of 4.9.  There is no mediastinal lymphadenopathy or activity on PET/CT.  There was a cavitary lesion in the left lower lobe posteriorly about 30 mm in size.  This lesion was not PET avid.    In my opinion, this is high risk patient was newly identified PET avid solid pulmonary nodule which is highly concerning for primary lung cancer. Given his MAYEN and SOB, I will obtain a pulmonary function test. If his lung function is reasonable, I would recommend a minimal invasive wedge resection for diagnostic and therapeutic purpose. If his lung function is poor, I will recommend SBRT.     I had a candid discussion with the patient and his wife. I outlined the treatment plan as above. The patient consented to proceed.     Michael Garcia MD  Thoracic Surgeon  [facility]   of Medicine  [facility]  Office phone: [phone]  Fax: [phone]  Pager: [phone]    Amount of time spent:  -Prep time on date of patient encounter: 30 min  -Time spend with patient/family/caregiver: 30 min  -Additional time spent on patient care activities: 10 min  -Documentation time in medical record: 15 min  -Other time spent on date of patient encounter: 0 min  Total time: 85 min

## 2024-10-11 NOTE — PROGRESS NOTES
Subjective   Patient ID: Jaycee Harp is a 64 y.o. male who presents for No chief complaint on file..    Patient presents today to follow up on lab results completed today        Review of Systems   Constitutional: Negative.  Negative for activity change, appetite change, fatigue and fever.   HENT:  Negative for congestion, dental problem, ear discharge, ear pain, mouth sores, rhinorrhea, sinus pressure, sinus pain, sore throat, tinnitus and trouble swallowing.    Eyes:  Negative for photophobia, pain and visual disturbance.   Respiratory:  Negative for cough, chest tightness, shortness of breath and stridor.    Cardiovascular:  Negative for chest pain and palpitations.   Gastrointestinal:  Negative for abdominal distention, abdominal pain, blood in stool, constipation, diarrhea and rectal pain.   Endocrine: Negative for cold intolerance, heat intolerance, polydipsia, polyphagia and polyuria.   Genitourinary:  Negative for dysuria, flank pain, hematuria and urgency.   Musculoskeletal:  Negative for arthralgias, gait problem, myalgias and neck stiffness.   Skin:  Negative for color change and rash.   Allergic/Immunologic: Negative for environmental allergies and food allergies.   Neurological:  Negative for dizziness, seizures, syncope, speech difficulty and headaches.   Hematological:  Negative for adenopathy.   Psychiatric/Behavioral:  Negative for agitation, confusion, decreased concentration, dysphoric mood and sleep disturbance. The patient is not nervous/anxious.        Objective   There were no vitals taken for this visit.    Physical Exam  Constitutional: Well developed, well nourished, alert and in no acute distress   Psychiatric: Mood calm and affect normal    Telephone Visit - Audio Communication Only      Assessment/Plan   Problem List Items Addressed This Visit             ICD-10-CM    BMI 40.0-44.9, adult (Multi) Z68.41    Relevant Medications    phentermine (Adipex-P) 37.5 mg tablet     Other Visit  Diagnoses         Codes    Pancytopenia     D61.818    Relevant Orders    CBC and Auto Differential    Folate    Vitamin B12    Iron and TIBC    Abnormal finding of blood chemistry, unspecified     R79.9    Relevant Orders    Iron and TIBC            Consent obtained by Jaycee Harp for audio communication. Total time for this audio communication visit is 12 minutes.         Scribe Attestation  By signing my name below, INilam RMA , [place]   attest that this documentation has been prepared under the direction and in the presence of Mandeep Tucker DO.   Provider Attestation - Scribe documentation    All medical record entries made by the Scribe were at my direction and personally dictated by me. I have reviewed the chart and agree that the record accurately reflects my personal performance of the history, physical exam, discussion and plan.

## 2024-10-11 NOTE — PATIENT INSTRUCTIONS
Follow up in 4 and 8 weeks for ma visit and in 3 months office visit     Continue current medications and therapy for chronic medical conditions.    Patient was advised importance of proper diet/nutrition in addition adequate hydration. Patient was encouraged moderate exercise program to include 30 minutes daily for 5 days of the week or 150 minutes weekly. Patient will follow-up with us as scheduled.    Adipex 37.5 mg daily    Review lab results from October 2024    Obtain labs to include CBC, B12, folic acid and iron studies

## 2024-10-13 PROBLEM — S06.6XAD: Status: ACTIVE | Noted: 2023-09-26

## 2024-10-13 ASSESSMENT — ENCOUNTER SYMPTOMS
NERVOUS/ANXIOUS: 1
SHORTNESS OF BREATH: 1

## 2024-10-16 ENCOUNTER — APPOINTMENT (OUTPATIENT)
Dept: PULMONOLOGY | Facility: CLINIC | Age: 64
End: 2024-10-16
Payer: MEDICARE

## 2024-10-16 VITALS
RESPIRATION RATE: 18 BRPM | WEIGHT: 295 LBS | TEMPERATURE: 97.9 F | BODY MASS INDEX: 43.69 KG/M2 | DIASTOLIC BLOOD PRESSURE: 72 MMHG | SYSTOLIC BLOOD PRESSURE: 124 MMHG | HEART RATE: 100 BPM | HEIGHT: 69 IN | OXYGEN SATURATION: 93 %

## 2024-10-16 DIAGNOSIS — F17.211 CIGARETTE NICOTINE DEPENDENCE IN REMISSION: ICD-10-CM

## 2024-10-16 DIAGNOSIS — R91.1 LUNG NODULE: ICD-10-CM

## 2024-10-16 DIAGNOSIS — J43.9 PULMONARY EMPHYSEMA, UNSPECIFIED EMPHYSEMA TYPE (MULTI): ICD-10-CM

## 2024-10-16 DIAGNOSIS — I10 PRIMARY HYPERTENSION: ICD-10-CM

## 2024-10-16 DIAGNOSIS — R09.02 HYPOXIA: ICD-10-CM

## 2024-10-16 DIAGNOSIS — R06.02 SHORTNESS OF BREATH: Primary | ICD-10-CM

## 2024-10-16 PROCEDURE — 1159F MED LIST DOCD IN RCRD: CPT | Performed by: NURSE PRACTITIONER

## 2024-10-16 PROCEDURE — 99205 OFFICE O/P NEW HI 60 MIN: CPT | Performed by: NURSE PRACTITIONER

## 2024-10-16 PROCEDURE — 1123F ACP DISCUSS/DSCN MKR DOCD: CPT | Performed by: NURSE PRACTITIONER

## 2024-10-16 PROCEDURE — 3008F BODY MASS INDEX DOCD: CPT | Performed by: NURSE PRACTITIONER

## 2024-10-16 PROCEDURE — 1126F AMNT PAIN NOTED NONE PRSNT: CPT | Performed by: NURSE PRACTITIONER

## 2024-10-16 RX ORDER — LOSARTAN POTASSIUM AND HYDROCHLOROTHIAZIDE 12.5; 5 MG/1; MG/1
1 TABLET ORAL DAILY
Qty: 90 TABLET | Refills: 0 | OUTPATIENT
Start: 2024-10-16

## 2024-10-16 ASSESSMENT — ENCOUNTER SYMPTOMS
OCCASIONAL FEELINGS OF UNSTEADINESS: 1
DEPRESSION: 1
FATIGUE: 1
BRUISES/BLEEDS EASILY: 1
HEMATURIA: 1
LOSS OF SENSATION IN FEET: 0
ACTIVITY CHANGE: 1

## 2024-10-16 ASSESSMENT — PAIN SCALES - GENERAL: PAINLEVEL_OUTOF10: 0-NO PAIN

## 2024-10-16 NOTE — PATIENT INSTRUCTIONS
1. FDG avid pleural-based right upper lobe nodule identified on recent  chest CT with maximum SUV of 4.9.   - seen by thoracic surgery - possible removal vs sbrt  - Plan for bronchoscopy with biopsy   - Lab work prior to procedure   - Not on any anticoagulation - but plts 27 -     Former smoker - 35 pack year history  - Obtain pulmonary function test, FENO   - start ICS/Laba/Lama Breztri 2 puffs twice a day, rinse and spit       Dyspnea multifactorial 2/2 anemia and emphysema   Hypoxia: Presents to clinic today with O2 sat 92_% on room air. Oxywalk completed. _82_% ambulating on room air dropped after 1 min 45 sec, 3__L O2 applied and  was __95-96__%.    Will order __3__ L nasal cannula at rest, 4L with activity   Contacted [facility] to bring O2 tank for patient.   Patient needs home Oxygen concentrator  Needs portable O2 concentrator with conserving device  Diagnosis hypoxia       Pancytopenia - referred to Hematology has upcoming appointment with Dr. Beebe             I explained the procedure to the patient. We discussed that the bronchoscopy will be performed by a member of the Interventional Pulmonary Team; depending on scheduling and provider availability. We also discussed that the IP providers function as a team and not infrequently may have to fill in for one another if there are emergent issues that need attention at the same time. The patient / family expressed understanding and agreed to proceed. All questions were answered.                 Thank you for visiting the Pulmonary clinic today!       Return to clinic after 4 weeks and after PFTs  or sooner if needed   Hattie García CNP  My office number is [phone] -     Call to schedule  for radiology - CT scans/PFTs etc at  [phone]  General scheduling  [phone]     Best way to get a hold of me is to call my office --> Please do not send me follow my health messages  Any test results will be discussed at next visit -- please make sure to  make a follow up appt after testing.

## 2024-10-17 ENCOUNTER — APPOINTMENT (OUTPATIENT)
Dept: RADIOLOGY | Facility: CLINIC | Age: 64
End: 2024-10-17
Payer: MEDICARE

## 2024-10-18 ENCOUNTER — HOSPITAL ENCOUNTER (OUTPATIENT)
Dept: RESPIRATORY THERAPY | Facility: HOSPITAL | Age: 64
End: 2024-10-18
Payer: MEDICARE

## 2024-10-21 ENCOUNTER — TELEPHONE (OUTPATIENT)
Dept: PULMONOLOGY | Facility: CLINIC | Age: 64
End: 2024-10-21
Payer: MEDICARE

## 2024-10-21 DIAGNOSIS — N52.9 ERECTILE DYSFUNCTION, UNSPECIFIED ERECTILE DYSFUNCTION TYPE: ICD-10-CM

## 2024-10-21 NOTE — TELEPHONE ENCOUNTER
Patient called to see if he could swith his oxygen machine to a more portable one as he said you two discussed (May be a mishap of the DME Co.)   Also wanted to know where to return the Ox tank he got from [facility]. Nurys has offered to call the DME Co and see what's going on with his machine order and also advised Pt to return tank to the office.

## 2024-10-21 NOTE — TELEPHONE ENCOUNTER
Dr. Tucker Pt    Refill for  tadalafil (Cialis) 20 mg tablet    [facility]/pharmacy #[id number] - [place], [place] - [address]

## 2024-10-23 RX ORDER — TADALAFIL 20 MG/1
20 TABLET ORAL DAILY PRN
Qty: 30 TABLET | Refills: 0 | Status: SHIPPED | OUTPATIENT
Start: 2024-10-23 | End: 2024-11-22

## 2024-10-24 ENCOUNTER — OFFICE VISIT (OUTPATIENT)
Dept: UROLOGY | Facility: CLINIC | Age: 64
End: 2024-10-24
Payer: MEDICARE

## 2024-10-24 VITALS
BODY MASS INDEX: 43.95 KG/M2 | HEART RATE: 99 BPM | RESPIRATION RATE: 20 BRPM | DIASTOLIC BLOOD PRESSURE: 68 MMHG | SYSTOLIC BLOOD PRESSURE: 129 MMHG | HEIGHT: 70 IN | WEIGHT: 307 LBS

## 2024-10-24 DIAGNOSIS — R31.0 GROSS HEMATURIA: Primary | ICD-10-CM

## 2024-10-24 DIAGNOSIS — N52.9 ERECTILE DYSFUNCTION, UNSPECIFIED ERECTILE DYSFUNCTION TYPE: ICD-10-CM

## 2024-10-24 DIAGNOSIS — R31.29 OTHER MICROSCOPIC HEMATURIA: ICD-10-CM

## 2024-10-24 PROCEDURE — 99214 OFFICE O/P EST MOD 30 MIN: CPT | Performed by: UROLOGY

## 2024-10-24 PROCEDURE — 1123F ACP DISCUSS/DSCN MKR DOCD: CPT | Performed by: UROLOGY

## 2024-10-24 PROCEDURE — G2211 COMPLEX E/M VISIT ADD ON: HCPCS | Performed by: UROLOGY

## 2024-10-24 PROCEDURE — 1126F AMNT PAIN NOTED NONE PRSNT: CPT | Performed by: UROLOGY

## 2024-10-24 PROCEDURE — 1159F MED LIST DOCD IN RCRD: CPT | Performed by: UROLOGY

## 2024-10-24 PROCEDURE — 3008F BODY MASS INDEX DOCD: CPT | Performed by: UROLOGY

## 2024-10-24 ASSESSMENT — PAIN SCALES - GENERAL: PAINLEVEL_OUTOF10: 0-NO PAIN

## 2024-10-24 NOTE — PROGRESS NOTES
PRIOR NOTES  64-year-old male see me regarding hematuria  PMH: BMI 44, lower urinary tract symptoms, alcohol use, snoring  Social: Former smoker, 150 pack year hx, also has hx of incarceration and drug use. Hasn;t used in 7 years  Functional - gets winded with limited activity  PSA 08/23: 0.92  UA 08/23: Moderate blood, otherwise unremarkable  No UA with micro present initially.     Pt reports no gross hematuria.   Had LUTS, but doing well on flomax. He had urgency, frequency.      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  Gets partial erections but sufficient for penetration.  No nocturnal erections.  No significant angulation.  **started tadalafil 20mg prn    10/12/2023 - 11-20 RBCs    10/7/24 - FDG PET w/ consern for lung ca    UPDATED SUBJECTIVE HISTORY  10/24/24 - pt was scheduled for cysto and ctu but had insurance issues. Now seeing gross hematuria. No trouble voiding, no LUTS. Getting good erections with cialis.  Scr 1.08    Past Medical History  He has a past medical history of Contact with and (suspected) exposure to covid-19 (08/28/2020), Hematuria (10/12/2023), Personal history of other endocrine, nutritional and metabolic disease (01/13/2021), and Personal history of other specified conditions (08/28/2020).    Surgical History  He has a past surgical history that includes Other surgical history (10/31/2019).     Social History  He reports that he quit smoking about 53 years ago. His smoking use included cigarettes. He started smoking about 4 weeks ago. He has been exposed to tobacco smoke. He has quit using smokeless tobacco. He reports that he does not currently use alcohol after a past usage of about 24.0 standard drinks of alcohol per week. He reports current drug use. Drug: Marijuana.    Family History  Family History   Problem Relation Name Age of Onset    Diabetes Mother      Other (varicose veins) Mother      Liver cancer Mother       "Diabetes Father      Tuberculosis Father          Allergies  Patient has no known allergies.    ROS: 12 system review was completed and is negative with the exception of those signs and symptoms noted in the history of present illness: A 12 system review was completed and is negative with the exception of those signs and symptoms noted in the history of present illness.     Exam:  General: in NAD, appears stated age  Head: normocephalic, atraumatic  Respiratory: Increased work of breathing on nasal cannula  Cardiovascular: no edema noted  Skin: normal turgor, no rashes  Neurologic: grossly intact, oriented to person/place/time  Psychiatric: mode and affect appropriate     Last Recorded Vitals  Blood pressure 129/68, pulse 99, resp. rate 20, height 1.765 m (5' 9.5\"), weight 139 kg (307 lb).    Lab Results   Component Value Date    PSASCREEN 0.57 10/11/2024    CREATININE 1.08 10/11/2024    HGB 9.3 (L) 10/11/2024         ASSESSMENT/PLAN:  # Gross hematuria  -Concerning for urothelial malignancy versus urolithiasis, BPH  -Cystoscopy, CT urogram ASAP  -Discussed possibilities in range of outcomes    # Erectile dysfunction  -Patient was taking tadalafil 20 mg daily  -I told him to stop, take only as needed    Jose Angulo MD    "

## 2024-10-26 ENCOUNTER — HOSPITAL ENCOUNTER (OUTPATIENT)
Dept: RADIOLOGY | Facility: CLINIC | Age: 64
Discharge: HOME | End: 2024-10-26
Payer: MEDICARE

## 2024-10-26 DIAGNOSIS — I25.10 CORONARY ARTERY DISEASE INVOLVING NATIVE CORONARY ARTERY OF NATIVE HEART WITHOUT ANGINA PECTORIS: ICD-10-CM

## 2024-10-26 DIAGNOSIS — R73.9 HYPERGLYCEMIA: ICD-10-CM

## 2024-10-26 PROCEDURE — 75571 CT HRT W/O DYE W/CA TEST: CPT

## 2024-10-29 ENCOUNTER — OFFICE VISIT (OUTPATIENT)
Dept: HEMATOLOGY/ONCOLOGY | Facility: CLINIC | Age: 64
End: 2024-10-29
Payer: MEDICARE

## 2024-10-29 VITALS
SYSTOLIC BLOOD PRESSURE: 117 MMHG | OXYGEN SATURATION: 96 % | RESPIRATION RATE: 16 BRPM | BODY MASS INDEX: 43.1 KG/M2 | DIASTOLIC BLOOD PRESSURE: 63 MMHG | TEMPERATURE: 96.8 F | WEIGHT: 296.08 LBS | HEART RATE: 93 BPM

## 2024-10-29 DIAGNOSIS — D61.818 PANCYTOPENIA: Primary | ICD-10-CM

## 2024-10-29 DIAGNOSIS — J44.9 CHRONIC OBSTRUCTIVE PULMONARY DISEASE, UNSPECIFIED COPD TYPE (MULTI): ICD-10-CM

## 2024-10-29 DIAGNOSIS — F17.211 CIGARETTE NICOTINE DEPENDENCE IN REMISSION: ICD-10-CM

## 2024-10-29 DIAGNOSIS — E66.01 MORBID OBESITY (MULTI): ICD-10-CM

## 2024-10-29 DIAGNOSIS — R91.1 LUNG NODULE: ICD-10-CM

## 2024-10-29 DIAGNOSIS — I10 PRIMARY HYPERTENSION: ICD-10-CM

## 2024-10-29 PROCEDURE — 99204 OFFICE O/P NEW MOD 45 MIN: CPT | Performed by: INTERNAL MEDICINE

## 2024-10-29 PROCEDURE — 1123F ACP DISCUSS/DSCN MKR DOCD: CPT | Performed by: INTERNAL MEDICINE

## 2024-10-29 PROCEDURE — 1159F MED LIST DOCD IN RCRD: CPT | Performed by: INTERNAL MEDICINE

## 2024-10-29 PROCEDURE — 1126F AMNT PAIN NOTED NONE PRSNT: CPT | Performed by: INTERNAL MEDICINE

## 2024-10-29 PROCEDURE — 99214 OFFICE O/P EST MOD 30 MIN: CPT | Performed by: INTERNAL MEDICINE

## 2024-10-29 PROCEDURE — 3078F DIAST BP <80 MM HG: CPT | Performed by: INTERNAL MEDICINE

## 2024-10-29 PROCEDURE — 3074F SYST BP LT 130 MM HG: CPT | Performed by: INTERNAL MEDICINE

## 2024-10-29 ASSESSMENT — PAIN SCALES - GENERAL: PAINLEVEL_OUTOF10: 0-NO PAIN

## 2024-10-30 PROBLEM — I10 PRIMARY HYPERTENSION: Status: ACTIVE | Noted: 2024-10-30

## 2024-10-30 PROBLEM — R91.1 LUNG NODULE: Status: ACTIVE | Noted: 2024-10-30

## 2024-10-30 PROBLEM — D61.818 PANCYTOPENIA: Status: ACTIVE | Noted: 2024-10-30

## 2024-10-30 PROBLEM — J44.9 CHRONIC OBSTRUCTIVE PULMONARY DISEASE (MULTI): Status: ACTIVE | Noted: 2024-10-30

## 2024-10-30 ASSESSMENT — ENCOUNTER SYMPTOMS
SHORTNESS OF BREATH: 1
GASTROINTESTINAL NEGATIVE: 1
NEUROLOGICAL NEGATIVE: 1
CARDIOVASCULAR NEGATIVE: 1
FATIGUE: 1
PSYCHIATRIC NEGATIVE: 1
HEMATOLOGIC/LYMPHATIC NEGATIVE: 1
ENDOCRINE NEGATIVE: 1
MUSCULOSKELETAL NEGATIVE: 1
EYES NEGATIVE: 1

## 2024-11-01 ENCOUNTER — TELEPHONE (OUTPATIENT)
Dept: PRIMARY CARE | Facility: CLINIC | Age: 64
End: 2024-11-01

## 2024-11-01 ENCOUNTER — PROCEDURE VISIT (OUTPATIENT)
Dept: HEMATOLOGY/ONCOLOGY | Facility: CLINIC | Age: 64
End: 2024-11-01
Payer: MEDICARE

## 2024-11-01 ENCOUNTER — APPOINTMENT (OUTPATIENT)
Dept: HEMATOLOGY/ONCOLOGY | Facility: CLINIC | Age: 64
End: 2024-11-01
Payer: MEDICARE

## 2024-11-01 ENCOUNTER — LAB (OUTPATIENT)
Dept: LAB | Facility: CLINIC | Age: 64
End: 2024-11-01
Payer: MEDICARE

## 2024-11-01 VITALS
TEMPERATURE: 97.7 F | WEIGHT: 300.27 LBS | BODY MASS INDEX: 43.71 KG/M2 | HEART RATE: 84 BPM | DIASTOLIC BLOOD PRESSURE: 66 MMHG | RESPIRATION RATE: 17 BRPM | SYSTOLIC BLOOD PRESSURE: 115 MMHG | OXYGEN SATURATION: 97 %

## 2024-11-01 DIAGNOSIS — D61.818 PANCYTOPENIA: ICD-10-CM

## 2024-11-01 DIAGNOSIS — R79.9 ABNORMAL FINDING OF BLOOD CHEMISTRY, UNSPECIFIED: ICD-10-CM

## 2024-11-01 LAB
BASO STIPL BLD QL SMEAR: PRESENT
BASOPHILS # BLD AUTO: 0 X10*3/UL (ref 0–0.1)
BASOPHILS NFR BLD AUTO: 0 %
DACRYOCYTES BLD QL SMEAR: NORMAL
EOSINOPHIL # BLD AUTO: 0.01 X10*3/UL (ref 0–0.7)
EOSINOPHIL NFR BLD AUTO: 0.6 %
ERYTHROCYTE [DISTWIDTH] IN BLOOD BY AUTOMATED COUNT: 14.2 % (ref 11.5–14.5)
FOLATE SERPL-MCNC: 9.4 NG/ML
HCT VFR BLD AUTO: 20.2 % (ref 41–52)
HGB BLD-MCNC: 7 G/DL (ref 13.5–17.5)
HYPOCHROMIA BLD QL SMEAR: NORMAL
IMM GRANULOCYTES # BLD AUTO: 0 X10*3/UL (ref 0–0.7)
IMM GRANULOCYTES NFR BLD AUTO: 0 % (ref 0–0.9)
IRON SATN MFR SERPL: ABNORMAL %
IRON SERPL-MCNC: 330 UG/DL (ref 35–150)
LYMPHOCYTES # BLD AUTO: 1.2 X10*3/UL (ref 1.2–4.8)
LYMPHOCYTES NFR BLD AUTO: 74.5 %
MCH RBC QN AUTO: 36.5 PG (ref 26–34)
MCHC RBC AUTO-ENTMCNC: 34.7 G/DL (ref 32–36)
MCV RBC AUTO: 105 FL (ref 80–100)
MONOCYTES # BLD AUTO: 0.12 X10*3/UL (ref 0.1–1)
MONOCYTES NFR BLD AUTO: 7.5 %
NEUTROPHILS # BLD AUTO: 0.28 X10*3/UL (ref 1.2–7.7)
NEUTROPHILS NFR BLD AUTO: 17.4 %
NRBC BLD-RTO: ABNORMAL /100{WBCS}
OVALOCYTES BLD QL SMEAR: NORMAL
PLATELET # BLD AUTO: 24 X10*3/UL (ref 150–450)
POLYCHROMASIA BLD QL SMEAR: NORMAL
RBC # BLD AUTO: 1.92 X10*6/UL (ref 4.5–5.9)
RBC MORPH BLD: NORMAL
TIBC SERPL-MCNC: ABNORMAL UG/DL
UIBC SERPL-MCNC: <55 UG/DL (ref 110–370)
VIT B12 SERPL-MCNC: 371 PG/ML (ref 211–911)
WBC # BLD AUTO: 1.6 X10*3/UL (ref 4.4–11.3)

## 2024-11-01 PROCEDURE — 81229 CYTOG ALYS CHRML ABNR SNPCGH: CPT | Performed by: INTERNAL MEDICINE

## 2024-11-01 PROCEDURE — 82746 ASSAY OF FOLIC ACID SERUM: CPT

## 2024-11-01 PROCEDURE — 38222 DX BONE MARROW BX & ASPIR: CPT

## 2024-11-01 PROCEDURE — 88280 CHROMOSOME KARYOTYPE STUDY: CPT | Performed by: INTERNAL MEDICINE

## 2024-11-01 PROCEDURE — 83540 ASSAY OF IRON: CPT

## 2024-11-01 PROCEDURE — 85025 COMPLETE CBC W/AUTO DIFF WBC: CPT

## 2024-11-01 PROCEDURE — 82607 VITAMIN B-12: CPT

## 2024-11-01 PROCEDURE — 36415 COLL VENOUS BLD VENIPUNCTURE: CPT

## 2024-11-01 ASSESSMENT — PAIN SCALES - GENERAL: PAINLEVEL_OUTOF10: 0-NO PAIN

## 2024-11-04 ENCOUNTER — HOSPITAL ENCOUNTER (OUTPATIENT)
Dept: RESPIRATORY THERAPY | Facility: HOSPITAL | Age: 64
Discharge: HOME | End: 2024-11-04
Payer: MEDICARE

## 2024-11-04 DIAGNOSIS — R91.1 LUNG NODULE: ICD-10-CM

## 2024-11-04 LAB
CELL COUNT (BLOOD): 1.05 X10*3/UL
CELL POPULATIONS: NORMAL
DIAGNOSIS: NORMAL
FLOW DIFFERENTIAL: NORMAL
FLOW TEST ORDERED: NORMAL
LAB TEST METHOD: NORMAL
MGC ASCENT PFT - FEV1 - POST: 1.91
MGC ASCENT PFT - FEV1 - PRE: 1.75
MGC ASCENT PFT - FEV1 - PREDICTED: 3.14
MGC ASCENT PFT - FVC - POST: 2.56
MGC ASCENT PFT - FVC - PRE: 2.39
MGC ASCENT PFT - FVC - PREDICTED: 4.09
NUMBER OF CELLS COLLECTED: NORMAL
PATH REPORT.TOTAL CANCER: NORMAL
SIGNATURE COMMENT: NORMAL
SPECIMEN VIABILITY: NORMAL

## 2024-11-04 PROCEDURE — 94726 PLETHYSMOGRAPHY LUNG VOLUMES: CPT

## 2024-11-05 ENCOUNTER — APPOINTMENT (OUTPATIENT)
Dept: PRIMARY CARE | Facility: CLINIC | Age: 64
End: 2024-11-05
Payer: MEDICARE

## 2024-11-05 VITALS
TEMPERATURE: 98.5 F | SYSTOLIC BLOOD PRESSURE: 124 MMHG | BODY MASS INDEX: 43.38 KG/M2 | HEART RATE: 105 BPM | RESPIRATION RATE: 16 BRPM | DIASTOLIC BLOOD PRESSURE: 74 MMHG | WEIGHT: 303 LBS | HEIGHT: 70 IN | OXYGEN SATURATION: 96 %

## 2024-11-05 DIAGNOSIS — I10 PRIMARY HYPERTENSION: ICD-10-CM

## 2024-11-05 DIAGNOSIS — E78.5 DYSLIPIDEMIA: ICD-10-CM

## 2024-11-05 DIAGNOSIS — J43.2 CENTRILOBULAR EMPHYSEMA (MULTI): ICD-10-CM

## 2024-11-05 DIAGNOSIS — C34.11 MALIGNANT NEOPLASM OF UPPER LOBE OF RIGHT LUNG (MULTI): ICD-10-CM

## 2024-11-05 DIAGNOSIS — D61.818 PANCYTOPENIA: Primary | ICD-10-CM

## 2024-11-05 DIAGNOSIS — E66.01 MORBID OBESITY (MULTI): ICD-10-CM

## 2024-11-05 PROCEDURE — G2211 COMPLEX E/M VISIT ADD ON: HCPCS | Performed by: FAMILY MEDICINE

## 2024-11-05 PROCEDURE — 99214 OFFICE O/P EST MOD 30 MIN: CPT | Performed by: FAMILY MEDICINE

## 2024-11-05 ASSESSMENT — ENCOUNTER SYMPTOMS
APPETITE CHANGE: 0
MYALGIAS: 0
HEMATURIA: 0
FEVER: 0
TROUBLE SWALLOWING: 0
SEIZURES: 0
SINUS PAIN: 0
COUGH: 0
ADENOPATHY: 0
ARTHRALGIAS: 0
CONSTIPATION: 0
COLOR CHANGE: 0
STRIDOR: 0
CONFUSION: 0
POLYDIPSIA: 0
DYSURIA: 0
DIARRHEA: 0
DYSPHORIC MOOD: 0
RECTAL PAIN: 0
NECK STIFFNESS: 0
POLYPHAGIA: 0
SPEECH DIFFICULTY: 0
EYE PAIN: 0
SINUS PRESSURE: 0
DECREASED CONCENTRATION: 0
ABDOMINAL DISTENTION: 0
RHINORRHEA: 0
HEADACHES: 0
FLANK PAIN: 0
NERVOUS/ANXIOUS: 0
ACTIVITY CHANGE: 0
CHEST TIGHTNESS: 0
DIZZINESS: 0
BLOOD IN STOOL: 0
AGITATION: 0
ABDOMINAL PAIN: 0
PHOTOPHOBIA: 0
SLEEP DISTURBANCE: 0
PALPITATIONS: 0
SORE THROAT: 0

## 2024-11-05 NOTE — PATIENT INSTRUCTIONS
Follow up in 4 weeks    Continue current medications and therapy for chronic medical conditions.    Patient was advised importance of proper diet/nutrition in addition adequate hydration. Patient was encouraged moderate exercise program to include 30 minutes daily for 5 days of the week or 150 minutes weekly. Patient will follow-up with us as scheduled.    DC Wegovy    Review lab results from October and November 2024    Review CT urography    Schedule CBC    Await bone marrow results

## 2024-11-05 NOTE — PROGRESS NOTES
"Subjective   Patient ID: Jaycee Harp is a 64 y.o. male who presents for Anxiety.    Patient presents in office with a complaint of anxiety. Patient admits he has been experiencing difficulties with the loss of a friend this past Saturday.        Review of Systems   Constitutional:  Positive for fatigue. Negative for activity change, appetite change and fever.   HENT:  Negative for congestion, dental problem, ear discharge, ear pain, mouth sores, rhinorrhea, sinus pressure, sinus pain, sore throat, tinnitus and trouble swallowing.    Eyes:  Negative for photophobia, pain and visual disturbance.   Respiratory:  Positive for shortness of breath. Negative for cough, chest tightness and stridor.    Cardiovascular:  Negative for chest pain and palpitations.   Gastrointestinal:  Negative for abdominal distention, abdominal pain, blood in stool, constipation, diarrhea and rectal pain.   Endocrine: Negative for cold intolerance, heat intolerance, polydipsia, polyphagia and polyuria.   Genitourinary:  Negative for dysuria, flank pain, hematuria and urgency.   Musculoskeletal:  Negative for arthralgias, gait problem, myalgias and neck stiffness.   Skin:  Negative for color change and rash.   Allergic/Immunologic: Negative for environmental allergies and food allergies.   Neurological:  Positive for weakness. Negative for dizziness, seizures, syncope, speech difficulty and headaches.   Hematological:  Negative for adenopathy.   Psychiatric/Behavioral:  Negative for agitation, confusion, decreased concentration, dysphoric mood and sleep disturbance. The patient is not nervous/anxious.        Objective   /74 (BP Location: Right arm, Patient Position: Sitting, BP Cuff Size: Large adult)   Pulse 105   Temp 36.9 °C (98.5 °F) (Temporal)   Resp 16   Ht 1.765 m (5' 9.5\")   Wt 137 kg (303 lb)   SpO2 96%   BMI 44.10 kg/m²     Physical Exam  Vitals reviewed.   Constitutional:       General: He is not in acute distress.     " Appearance: He is obese. He is not ill-appearing or diaphoretic.   HENT:      Head: Normocephalic.      Right Ear: Tympanic membrane and external ear normal.      Left Ear: Tympanic membrane and external ear normal.      Nose: Nose normal. No congestion.      Mouth/Throat:      Pharynx: No posterior oropharyngeal erythema.   Eyes:      General:         Right eye: No discharge.         Left eye: No discharge.      Extraocular Movements: Extraocular movements intact.      Conjunctiva/sclera: Conjunctivae normal.      Pupils: Pupils are equal, round, and reactive to light.   Cardiovascular:      Rate and Rhythm: Normal rate and regular rhythm.      Pulses: Normal pulses.      Heart sounds: Normal heart sounds. No murmur heard.  Pulmonary:      Effort: No respiratory distress.      Breath sounds: No wheezing or rales.      Comments: Diminished breath sounds bilaterally, respiratory rate 18  Chest:      Chest wall: No tenderness.   Abdominal:      General: Bowel sounds are normal. There is distension.      Palpations: There is no mass.      Tenderness: There is no abdominal tenderness. There is no guarding.   Musculoskeletal:         General: No tenderness. Normal range of motion.      Cervical back: Normal range of motion and neck supple. No tenderness.      Right lower leg: No edema.      Left lower leg: No edema.   Skin:     General: Skin is dry.      Coloration: Skin is not jaundiced.      Findings: No bruising, erythema or rash.   Neurological:      General: No focal deficit present.      Mental Status: He is alert and oriented to person, place, and time. Mental status is at baseline.      Cranial Nerves: No cranial nerve deficit.      Sensory: No sensory deficit.      Coordination: Coordination normal.      Gait: Gait normal.   Psychiatric:         Mood and Affect: Mood normal.         Thought Content: Thought content normal.         Judgment: Judgment normal.         Assessment/Plan   Problem List Items Addressed  This Visit             ICD-10-CM    Dyslipidemia E78.5    Morbid obesity (Multi) E66.01    BMI 40.0-44.9, adult (Multi) Z68.41    Malignant neoplasm of upper lobe of right lung (Multi) C34.11    Pancytopenia - Primary D61.818    Relevant Orders    CBC and Auto Differential    Primary hypertension I10    Chronic obstructive pulmonary disease (Multi) J44.9     Scribe Attestation  By signing my name below, I, Lorraine Gilbert MA , Scribe   attest that this documentation has been prepared under the direction and in the presence of Mandeep Tucker DO.    Provider Attestation - Scribe documentation    All medical record entries made by the Scribe were at my direction and personally dictated by me. I have reviewed the chart and agree that the record accurately reflects my personal performance of the history, physical exam, discussion and plan.

## 2024-11-06 ASSESSMENT — ENCOUNTER SYMPTOMS
FATIGUE: 1
ACTIVITY CHANGE: 1
HEMATURIA: 1
BRUISES/BLEEDS EASILY: 1

## 2024-11-06 NOTE — PROGRESS NOTES
Patient: Jaycee Harp    [id number]  : 1960 -- AGE 64 y.o.    Provider: LALO Allison     Location [facility]   Service Date: 2024              [facility]  New Visit Note      HISTORY OF PRESENT ILLNESS     The patient's referring provider is: No ref. provider found    HISTORY OF PRESENT ILLNESS   Jaycee Harp is a 64 y.o. male who presents to a [facility] for an evaluation with concerns of Follow-up. I have independently interviewed and examined the patient in the office and reviewed available records.    Current History 10/16/2024    On today's visit, the patient reports he felt short of breath walking short distances, he could not breath while sleeping laying down, now sleeping in the chair.  He Smoked for about 20 years x 1.5ppd, stopped when he was incarcerated for 20 years  At baseline,  has dyspnea on exertion, but none at rest. He is a  other than work he is sedentary. Has to stop for breath after walking about 100 meters or a few minutes (mMRC 3). Has orthopnea while sleeping supine, has dominick, had fluid seeping.  Has gained X 30 pounds in the last X 12 months.   Relates dry chronic cough with wite secretions , c/o wheezing, and denies green, blood streaks sputum. No night cough. No hemoptysis. No fever or shivering chills. Has a runny nose, and a tingling sensation in the back of his throat. Also complains of heartburn - on a PPI. Denies chest pain    He has had hematuria for the past year and needs to follow-up with urology    Previous pulmonary history:  no history of recurrent infections, or lung disease as a child.  No previous lung hx, never on oxygen or inhaler therapy.     Inhalers/nebulized medications: albuterol - not used    Hospitalization History: Not been hospitalized over the last year for breathing related problem.    Sleep history:  Complains of snoring, apnea, feeling tired  during the day, and taking naps during the day.   Has RISHABH and cannot tolerate machine     Current History 11/19/2024     On today's visit, the patient reports he was recently discharged from the hospital 11/16 due to anemia due to bone marrow failure. S/p bone marrow biopsy. He needs to F/U with hematology/oncology follow-up for myelofibrosis     S/p 2 units of blood transfusion.  He felt more energy after transfusion. He is using 3 L continuously.  Reports he is triggered by warm air or heat.   Denies coughing or wheeze, Fevers/chills, or chest pain.   He reports the night after he was discharged he felt cold alternating with hot.   He denies SOB at rest  Report energy levels being low, He has MAYEN about 5 minutes of walking.   Denies GERD  Has bloody nose, resolved spontaneously   Feels better using breztri - describes as his lungs are expanding   Stopped smoking       ALLERGIES AND MEDICATIONS     ALLERGIES  No Known Allergies    MEDICATIONS  Current Outpatient Medications   Medication Sig Dispense Refill    albuterol (ProAir HFA) 90 mcg/actuation inhaler Inhale 2 puffs every 4 hours if needed for wheezing or shortness of breath. 8.5 g 1    losartan-hydrochlorothiazide (Hyzaar) 50-12.5 mg tablet TAKE 1 TABLET BY MOUTH EVERY DAY 90 tablet 0    omeprazole (PriLOSEC) 20 mg DR capsule Take 1 capsule (20 mg) by mouth 2 times a day. Do not crush or chew. 90 capsule 0    phentermine (Adipex-P) 37.5 mg tablet Take 1 tablet (37.5 mg) by mouth once daily in the morning. Take before meals. 30 tablet 0    tadalafil (Cialis) 20 mg tablet Take 1 tablet (20 mg) by mouth once daily as needed for erectile dysfunction. 30 tablet 0    varenicline (Chantix) 0.5 mg tablet Take 1 tablet (0.5 mg) by mouth 2 times a day. Take with full glass of water.       No current facility-administered medications for this visit.         PAST HISTORY     PAST MEDICAL HISTORY  He  has a past medical history of Contact with and (suspected) exposure to  covid-19 (08/28/2020), Hematuria (10/12/2023), Personal history of other endocrine, nutritional and metabolic disease (01/13/2021), and Personal history of other specified conditions (08/28/2020).  New leukopenia, pantocytopenia     PAST SURGICAL HISTORY  Past Surgical History:   Procedure Laterality Date    OTHER SURGICAL HISTORY  10/31/2019    Rectal surgery       IMMUNIZATION HISTORY  Immunization History   Administered Date(s) Administered    Tdap vaccine, age 7 year and older (BOOSTRIX, ADACEL) 09/15/2021       SOCIAL HISTORY  He  reports that he quit smoking about 53 years ago. His smoking use included cigarettes. He started smoking about 8 weeks ago. He has been exposed to tobacco smoke. He has quit using smokeless tobacco. He reports that he does not currently use alcohol after a past usage of about 24.0 standard drinks of alcohol per week. He reports current drug use. Drug: Marijuana. He Patient   Patient admits to heavy alcohol use including liquor and beer  States he is smoking 1.5 packs/day  Has been smoking for 53 years   Ready to quit smoking - 3 weeks ago     OCCUPATIONAL/ENVIRONMENTAL HISTORY  Currently works as:  at [employer] - known exposure to asbestos   DOES/DOES NOT EC: does have known exposure to asbestos, silica, beryllium or inhaled metals.  DOES/DOES NOT EC: does not have exposure to birds or exotic animals- 2 dogs and 2 cats     FAMILY HISTORY  Family History   Problem Relation Name Age of Onset    Diabetes Mother      Other (varicose veins) Mother      Liver cancer Mother      Diabetes Father      Tuberculosis Father       DOES/DOES NOT EC: does have a family history of pulmonary disease. Paternal uncle had lung cancer, father had asthma paternal grandmother had asthma   DOES/DOES NOT EC: does have a family history of cancer.   DOES/DOES NOT EC: does not have a family history of autoimmune disorders.    RESULTS/DATA     Pulmonary Function Test Results     Pulmonary Function Test - Scan  on 11/4/2024   Narrative & Impression   The FEV1/FVC (0.73) is normal. The FEV1 (1.73L/55% is moderately reduced. The FVC is moderately reduced. Following administration of bronchodilators, there is no significant response. The TLC by body plethysmography is reduced, suggesting a moderate restrictive defect. The DLCO (55%)s moderately reduced; however, the diffusing capacity was not corrected for the patient's hemoglobin. The airways resistance is normal.   Conclusion: Spirometry and lung volumes by plethysmography indicate a complex restrictive ventilatory impairment. DLCO values are consistent with loss of alveoli capillary structure with loss of lung volume ? e.g., emphysema or ILD. Patient has adequate preoperative lung reserves for lobectomy but somewhat borderline lung disease for a pneumonectomy.  Please correlate clinically.     Scans on Order [id number]                                        Chest Radiograph     XR CHEST 1 VIEW 09/15/2021  Impression  Borderline cardiomegaly with appearance of pulmonary vascular  congestion.      Chest CT Scan     CT lung screening low dose 10/03/2024    Impression  1. 1.7 cm pleural-based nodule in the right upper lobe which is very  suspicious for malignancy. Recommend thoracic surgery consultation as  well as PET-CT and/or tissue sampling for further evaluation.  2. Mild upper lung predominant emphysema and smoking related  interstitial changes.  3. Mild coronary artery calcifications.  4. Hepatic steatosis. Correlate with liver function tests.  5. Mildly dilated main pulmonary artery which can be seen with  pulmonary hypertension.    ----------------------------------------------------------    NM PET CT LUNG SPN; 10/7/2024       IMPRESSION:  1. FDG avid pleural-based nodule in the right upper lobe concerning  for primary lung neoplasm.  2. Mild FDG avidity along the periphery paraseptal emphysematous  changes within the left lower lobe, favored to represent  "atelectasis.  3. No other concerning FDG avid disease identified.      Echocardiogram     No testing done          REVIEW OF SYSTEMS     REVIEW OF SYSTEMS  Review of Systems   Constitutional:  Positive for activity change and fatigue.   Genitourinary:  Positive for hematuria.   Hematological:  Bruises/bleeds easily.         PHYSICAL EXAM     VITAL SIGNS: /68   Pulse 91   Resp 18   Ht 1.778 m (5' 10\")   Wt 135 kg (297 lb 12.8 oz)   SpO2 92% Comment: 3L  BMI 42.73 kg/m²  /68   Pulse 91   Resp 18   Ht 1.778 m (5' 10\")   Wt 135 kg (297 lb 12.8 oz)   SpO2 92% Comment: 3L  BMI 42.73 kg/m²      CURRENT WEIGHT: [unfilled]  BMI: [unfilled]  PREVIOUS WEIGHTS:  Wt Readings from Last 3 Encounters:   11/19/24 135 kg (297 lb 12.8 oz)   11/15/24 134 kg (296 lb)   11/14/24 137 kg (303 lb)       Physical Exam  Constitutional:       Appearance: Normal appearance. He is obese.   HENT:      Head: Normocephalic and atraumatic.      Right Ear: External ear normal.      Left Ear: External ear normal.      Nose: Nose normal.      Mouth/Throat:      Mouth: Mucous membranes are moist.      Pharynx: Oropharynx is clear.   Eyes:      Extraocular Movements: Extraocular movements intact.      Conjunctiva/sclera: Conjunctivae normal.      Pupils: Pupils are equal, round, and reactive to light.   Cardiovascular:      Rate and Rhythm: Regular rhythm. Tachycardia present.      Pulses: Normal pulses.      Heart sounds: Normal heart sounds.   Pulmonary:      Effort: Pulmonary effort is normal.      Breath sounds: Wheezing present.   Abdominal:      General: Bowel sounds are normal.      Palpations: Abdomen is soft.   Musculoskeletal:         General: Normal range of motion.      Cervical back: Normal range of motion and neck supple.   Skin:     General: Skin is warm and dry.   Neurological:      General: No focal deficit present.      Mental Status: He is alert and oriented to person, place, and time. Mental status is at baseline. "   Psychiatric:         Mood and Affect: Mood normal.         Behavior: Behavior normal.         Thought Content: Thought content normal.         Judgment: Judgment normal.         ASSESSMENT/PLAN     Mr. Harp is a 64 y.o. male and  has a past medical history of Contact with and (suspected) exposure to covid-19 (08/28/2020), Hematuria (10/12/2023), Personal history of other endocrine, nutritional and metabolic disease (01/13/2021), and Personal history of other specified conditions (08/28/2020). He was referred to the [facility] for evaluation of current smoker with dyspnea     Problem List and Orders      Assessment and Plan / Recommendations:  Problem List Items Addressed This Visit    None              FDG avid pleural-based right upper lobe nodule  and newly diagnosed with MYELOPROLIFERATIVE NEOPLASM with pancytopenia   - defer to thoracic and hem/Onc as new diagnosis of myeloproliferative   - seen by thoracic surgery - possible removal vs sbrt,       Former smoker - 35 pack year history/dyspnea  - stopped smoking 5 weeks ago   - Obtain pulmonary function test - Conclusion: Spirometry and lung volumes by plethysmography indicate a complex restrictive ventilatory impairment. DLCO reduced due to multifactorial   - cont ICS/Laba/Lama Breztri 2 puffs twice a day, rinse and spit   - albuterol hfa 2 puffs or albuterol nebs every 4-6 hours as needed        Dyspnea/Hypoxia multifactorial 2/2 anemia   Will order __3__ L nasal cannula at rest, 4L with activity         Pancytopenia - referred to Hematology has upcoming appointment with Dr. Beebe   newly diagnosed with MYELOPROLIFERATIVE NEOPLASM with pancytopenia   - discussed if develops fever or uncontrolled breathing will need to seek emergency care as was neutropenic at discharge       Thank you for visiting the Pulmonary clinic today!       Return to clinic after 4 -6 weeks and  sooner if needed   Hattie García CNP  My office  number is [phone] -     Call to schedule  for radiology - CT scans/PFTs etc at  [phone]  General scheduling  [phone]     Best way to get a hold of me is to call my office --> Please do not send me follow my health messages  Any test results will be discussed at next visit -- please make sure to make a follow up appt after testing.

## 2024-11-07 ENCOUNTER — HOSPITAL ENCOUNTER (OUTPATIENT)
Dept: RADIOLOGY | Facility: HOSPITAL | Age: 64
Discharge: HOME | End: 2024-11-07
Payer: MEDICARE

## 2024-11-07 DIAGNOSIS — R31.29 OTHER MICROSCOPIC HEMATURIA: ICD-10-CM

## 2024-11-07 PROCEDURE — 2550000001 HC RX 255 CONTRASTS

## 2024-11-07 PROCEDURE — 76377 3D RENDER W/INTRP POSTPROCES: CPT | Performed by: STUDENT IN AN ORGANIZED HEALTH CARE EDUCATION/TRAINING PROGRAM

## 2024-11-07 PROCEDURE — 74178 CT ABD&PLV WO CNTR FLWD CNTR: CPT | Performed by: STUDENT IN AN ORGANIZED HEALTH CARE EDUCATION/TRAINING PROGRAM

## 2024-11-07 PROCEDURE — 76377 3D RENDER W/INTRP POSTPROCES: CPT

## 2024-11-08 LAB
ELECTRONICALLY SIGNED BY: NORMAL
MYELOID NGS RESULTS: NORMAL
PATH REPORT.COMMENTS IMP SPEC: NORMAL
PATH REPORT.FINAL DX SPEC: NORMAL
PATH REPORT.GROSS SPEC: NORMAL
PATH REPORT.MICROSCOPIC SPEC OTHER STN: NORMAL
PATH REPORT.RELEVANT HX SPEC: NORMAL
PATH REPORT.TOTAL CANCER: NORMAL

## 2024-11-08 RX ORDER — LIDOCAINE HYDROCHLORIDE 20 MG/ML
1 JELLY TOPICAL ONCE
Status: DISCONTINUED | OUTPATIENT
Start: 2024-11-14 | End: 2024-11-14 | Stop reason: HOSPADM

## 2024-11-10 DIAGNOSIS — D75.81 MYELOFIBROSIS (MULTI): Primary | ICD-10-CM

## 2024-11-10 ASSESSMENT — ENCOUNTER SYMPTOMS
FATIGUE: 1
SHORTNESS OF BREATH: 1
WEAKNESS: 1

## 2024-11-13 NOTE — TUMOR BOARD NOTE
TUMOR BOARD DISCUSSION SUMMARY    PRESENTER: Dr. Raul Beebe    DIAGNOSIS: myeloproliferative neoplasm    SUMMARY/PRESENTATION: Jaycee Harp is a 64 y.o. male patient who presents with biopsy favoring primary myelofibrosis     History: COPD (oxygen dependent), diabetes mellitus, obesity     Radiology review: RUL lung node, of indetermined significance.    Information reviewed: Pathology Review    Pathology:   Bone Marrow Biopsy 11/01/24  --HYPERCELLULAR BONE MARROW (95%) WITH ATYPICAL MEGAKARYOCYTES AND INCREASED FIBROSIS CONSISTENT WITH MYELOPROLIFERATIVE NEOPLASM, SEE NOTE.     NOTE: Sections show increased and morphologically atypical megakaryocytes frequently arranged in clusters in a slightly hypercellular bone marrow. Blasts are not increased by morphology or CD34 immunohistochemical stain. There is increaased reticulin fibrosis (MF grade 2). There is not definitive morphologic evidence of dysplasia. 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 The overall findings are most consistent with a myeloproliferative neoplasm, favor primary myelofibrosis. Chromosomal analysis and FISH studies will be attempted and results will be reported separately. Correlation with clinical findings is recommended.  -- Population of myeloblasts (0.4%) with mild phenotypic atypia, and mild immunophenotypic atypia of monocytes, see note.  -- No immunophenotypic evidence of a lymphoproliferative disorder.     RECOMMENDATIONS: High grade myelofibrosis with high MIPP 70 score, consider KEVIN 2 inhibitor,  evaluated for allograft although may not be candidate due to comorbities           Disclaimer     [facility] tumor board recommendations represent the consensus opinion of physicians present at a weekly patient care conference. The treating [facility] physician is not always present, and many of the physicians formulating the recommendation  have not personally seen or examined the patient under discussion. It is understood that the treating [facility] physician considers the expertise of the Tumor Board Recommendation in formulating his/her plan for the patient. However, in many situations, based on individualized patient considerations, a different plan is determined by the treating physician to be the optimal medical management.     Scribe Attestation  By signing my name below, Dav MILLER Scribe   attest that this documentation has been prepared under the direction and in the presence of MALIGNANT HEME TUMOR BOARD.

## 2024-11-14 ENCOUNTER — TELEPHONE (OUTPATIENT)
Dept: PRIMARY CARE | Facility: CLINIC | Age: 64
End: 2024-11-14

## 2024-11-14 ENCOUNTER — LAB (OUTPATIENT)
Dept: LAB | Facility: LAB | Age: 64
End: 2024-11-14
Payer: MEDICARE

## 2024-11-14 ENCOUNTER — HOSPITAL ENCOUNTER (INPATIENT)
Facility: HOSPITAL | Age: 64
LOS: 1 days | Discharge: HOME | End: 2024-11-16
Attending: STUDENT IN AN ORGANIZED HEALTH CARE EDUCATION/TRAINING PROGRAM | Admitting: INTERNAL MEDICINE
Payer: MEDICARE

## 2024-11-14 ENCOUNTER — PROCEDURE VISIT (OUTPATIENT)
Dept: UROLOGY | Facility: CLINIC | Age: 64
End: 2024-11-14
Payer: MEDICARE

## 2024-11-14 ENCOUNTER — TUMOR BOARD CONFERENCE (OUTPATIENT)
Dept: HEMATOLOGY/ONCOLOGY | Facility: HOSPITAL | Age: 64
End: 2024-11-14
Payer: MEDICARE

## 2024-11-14 VITALS
HEIGHT: 70 IN | WEIGHT: 303 LBS | DIASTOLIC BLOOD PRESSURE: 65 MMHG | SYSTOLIC BLOOD PRESSURE: 101 MMHG | BODY MASS INDEX: 43.38 KG/M2 | HEART RATE: 103 BPM

## 2024-11-14 DIAGNOSIS — D61.818 PANCYTOPENIA: ICD-10-CM

## 2024-11-14 DIAGNOSIS — R31.0 GROSS HEMATURIA: ICD-10-CM

## 2024-11-14 DIAGNOSIS — N20.0 NEPHROLITHIASIS: ICD-10-CM

## 2024-11-14 DIAGNOSIS — D61.9 ANEMIA DUE TO BONE MARROW FAILURE, UNSPECIFIED BONE MARROW FAILURE TYPE (MULTI): Primary | ICD-10-CM

## 2024-11-14 LAB
BASOPHILS # BLD MANUAL: 0 X10*3/UL (ref 0–0.1)
BASOPHILS NFR BLD MANUAL: 0 %
DACRYOCYTES BLD QL SMEAR: ABNORMAL
EOSINOPHIL # BLD MANUAL: 0.04 X10*3/UL (ref 0–0.7)
EOSINOPHIL NFR BLD MANUAL: 2 %
ERYTHROCYTE [DISTWIDTH] IN BLOOD BY AUTOMATED COUNT: 14.7 % (ref 11.5–14.5)
HCT VFR BLD AUTO: 19 % (ref 41–52)
HGB BLD-MCNC: 6.4 G/DL (ref 13.5–17.5)
HYPOCHROMIA BLD QL SMEAR: ABNORMAL
IMM GRANULOCYTES # BLD AUTO: 0.01 X10*3/UL (ref 0–0.7)
IMM GRANULOCYTES NFR BLD AUTO: 0.5 % (ref 0–0.9)
LYMPHOCYTES # BLD MANUAL: 1.44 X10*3/UL (ref 1.2–4.8)
LYMPHOCYTES NFR BLD MANUAL: 76 %
MCH RBC QN AUTO: 35.8 PG (ref 26–34)
MCHC RBC AUTO-ENTMCNC: 33.7 G/DL (ref 32–36)
MCV RBC AUTO: 106 FL (ref 80–100)
MONOCYTES # BLD MANUAL: 0.1 X10*3/UL (ref 0.1–1)
MONOCYTES NFR BLD MANUAL: 5 %
NEUTROPHILS # BLD MANUAL: 0.31 X10*3/UL (ref 1.2–7.7)
NEUTS BAND # BLD MANUAL: 0.02 X10*3/UL (ref 0–0.7)
NEUTS BAND NFR BLD MANUAL: 1 %
NEUTS SEG # BLD MANUAL: 0.29 X10*3/UL (ref 1.2–7)
NEUTS SEG NFR BLD MANUAL: 15 %
NRBC BLD-RTO: 4.7 /100 WBCS (ref 0–0)
OVALOCYTES BLD QL SMEAR: ABNORMAL
PLATELET # BLD AUTO: 22 X10*3/UL (ref 150–450)
POLYCHROMASIA BLD QL SMEAR: ABNORMAL
RBC # BLD AUTO: 1.79 X10*6/UL (ref 4.5–5.9)
RBC MORPH BLD: ABNORMAL
TOTAL CELLS COUNTED BLD: 100
VARIANT LYMPHS # BLD MANUAL: 0.02 X10*3/UL (ref 0–0.5)
VARIANT LYMPHS NFR BLD: 1 %
WBC # BLD AUTO: 1.9 X10*3/UL (ref 4.4–11.3)

## 2024-11-14 PROCEDURE — 36415 COLL VENOUS BLD VENIPUNCTURE: CPT

## 2024-11-14 PROCEDURE — 99213 OFFICE O/P EST LOW 20 MIN: CPT | Performed by: UROLOGY

## 2024-11-14 PROCEDURE — 2500000005 HC RX 250 GENERAL PHARMACY W/O HCPCS: Performed by: UROLOGY

## 2024-11-14 PROCEDURE — 52000 CYSTOURETHROSCOPY: CPT | Performed by: UROLOGY

## 2024-11-14 PROCEDURE — 99291 CRITICAL CARE FIRST HOUR: CPT | Performed by: STUDENT IN AN ORGANIZED HEALTH CARE EDUCATION/TRAINING PROGRAM

## 2024-11-14 PROCEDURE — 85027 COMPLETE CBC AUTOMATED: CPT

## 2024-11-14 PROCEDURE — 85007 BL SMEAR W/DIFF WBC COUNT: CPT

## 2024-11-14 ASSESSMENT — COLUMBIA-SUICIDE SEVERITY RATING SCALE - C-SSRS
6. HAVE YOU EVER DONE ANYTHING, STARTED TO DO ANYTHING, OR PREPARED TO DO ANYTHING TO END YOUR LIFE?: NO
1. IN THE PAST MONTH, HAVE YOU WISHED YOU WERE DEAD OR WISHED YOU COULD GO TO SLEEP AND NOT WAKE UP?: NO
2. HAVE YOU ACTUALLY HAD ANY THOUGHTS OF KILLING YOURSELF?: NO

## 2024-11-14 ASSESSMENT — PAIN SCALES - GENERAL: PAINLEVEL_OUTOF10: 0 - NO PAIN

## 2024-11-14 ASSESSMENT — PAIN - FUNCTIONAL ASSESSMENT: PAIN_FUNCTIONAL_ASSESSMENT: 0-10

## 2024-11-14 NOTE — PROGRESS NOTES
History Of Present Illness  64-year-old male see me regarding hematuria  PMH: BMI 44, lower urinary tract symptoms, alcohol use, snoring  Social: Former smoker, 150 pack year hx, also has hx of incarceration and drug use. Hasn;t used in 7 years  Functional - gets winded with limited activity  PSA 08/23: 0.92  UA 08/23: Moderate blood, otherwise unremarkable  No UA with micro present initially.     Pt reports no gross hematuria.   Had LUTS, but doing well on flomax. He had urgency, frequency.      Patient also has ED, using over-the-counter supplements from the [employer] which she reports works.  Gets partial erections but sufficient for penetration.  No nocturnal erections.  No significant angulation.  **started tadalafil 20mg prn     10/12/2023 - 11-20 RBCs     10/7/24 - FDG PET w/ concern for lung ca     10/24/24 - pt was scheduled for cysto and ctu but had insurance issues. Now seeing gross hematuria. No trouble voiding, no LUTS. Getting good erections with cialis.  Scr 1.08    CTU 11/7/24 - personally viewed and interpreted -9 mm stone left lower pole, no other suspicious findings  PSA 0.57    Past Medical History  He has a past medical history of Contact with and (suspected) exposure to covid-19 (08/28/2020), Hematuria (10/12/2023), Personal history of other endocrine, nutritional and metabolic disease (01/13/2021), and Personal history of other specified conditions (08/28/2020).    Surgical History  He has a past surgical history that includes Other surgical history (10/31/2019).     Social History  He reports that he quit smoking about 53 years ago. His smoking use included cigarettes. He started smoking about 7 weeks ago. He has been exposed to tobacco smoke. He has quit using smokeless tobacco. He reports that he does not currently use alcohol after a past usage of about 24.0 standard drinks of alcohol per week. He reports current drug use. Drug: Marijuana.    Family History  Family History   Problem  "Relation Name Age of Onset    Diabetes Mother      Other (varicose veins) Mother      Liver cancer Mother      Diabetes Father      Tuberculosis Father          Allergies  Patient has no known allergies.    ROS: 12 system review was completed and is negative with the exception of those signs and symptoms noted in the history of present illness: A 12 system review was completed and is negative with the exception of those signs and symptoms noted in the history of present illness.     Exam:  General: in NAD, appears stated age  Head: normocephalic, atraumatic  Respiratory: On supplemental O2  Cardiovascular: no edema noted  Skin: normal turgor, no rashes  Neurologic: grossly intact, oriented to person/place/time  Psychiatric: mode and affect appropriate    Patient ID: Jaycee Harp is a 64 y.o. male.    Cystoscopy    Date/Time: 11/14/2024 2:35 PM    Performed by: oJse Angulo MD  Authorized by: Jose Angulo MD      Comments:      The R/B/A to the following procedure were discussed and an informed consent was signed. A time-out was performed confirming the correct procedure, laterality (if applicable), and plan.    The patient was prepped and draped in the standard sterile fashion. A 16 [place] flexible cystoscope was inserted through the penis. The following finding were noted:    Anterior urethra -normal, had to pop through a small narrowing at the meatus but remainder of the anterior urethra normal  Prostate -not enlarged, non-obstructing  Bladder mucosa-normal without tumors, stones, or other lesions  Ureteral orifices -visualized in orthotopic position    The patient tolerated the procedure well.         Last Recorded Vitals  Blood pressure 101/65, pulse 103, height 1.765 m (5' 9.5\"), weight 137 kg (303 lb).    Lab Results   Component Value Date    PSASCREEN 0.57 10/11/2024    CREATININE 1.08 10/11/2024    HGB 7.0 (L) 11/01/2024         ASSESSMENT/PLAN:  # Hematuria  -Either secondary to the stone or small " stricture at the meatus  -Benign evaluation otherwise    # Nephrolithiasis  -Asymptomatic  -Recommend observation while he is being worked up for malignancy  -Follow-up in 3 to 4 months for nephrolithiasis with KUB    Jose Angulo MD

## 2024-11-15 ENCOUNTER — APPOINTMENT (OUTPATIENT)
Dept: RADIOLOGY | Facility: HOSPITAL | Age: 64
End: 2024-11-15
Payer: MEDICARE

## 2024-11-15 ENCOUNTER — APPOINTMENT (OUTPATIENT)
Dept: HEMATOLOGY/ONCOLOGY | Facility: CLINIC | Age: 64
End: 2024-11-15
Payer: MEDICARE

## 2024-11-15 PROBLEM — D64.9 ACUTE ON CHRONIC ANEMIA: Status: ACTIVE | Noted: 2024-11-15

## 2024-11-15 PROBLEM — D61.9: Status: ACTIVE | Noted: 2024-11-15

## 2024-11-15 PROBLEM — R04.0 EPISTAXIS: Status: ACTIVE | Noted: 2024-11-15

## 2024-11-15 LAB
ABO GROUP (TYPE) IN BLOOD: NORMAL
ABO GROUP (TYPE) IN BLOOD: NORMAL
ALBUMIN SERPL BCP-MCNC: 3.7 G/DL (ref 3.4–5)
ALBUMIN SERPL BCP-MCNC: 3.9 G/DL (ref 3.4–5)
ALP SERPL-CCNC: 64 U/L (ref 33–136)
ALP SERPL-CCNC: 71 U/L (ref 33–136)
ALT SERPL W P-5'-P-CCNC: 58 U/L (ref 10–52)
ALT SERPL W P-5'-P-CCNC: 62 U/L (ref 10–52)
ANION GAP SERPL CALC-SCNC: 14 MMOL/L (ref 10–20)
ANION GAP SERPL CALC-SCNC: 9 MMOL/L (ref 10–20)
ANTIBODY SCREEN: NORMAL
APPEARANCE UR: CLEAR
APTT PPP: 25 SECONDS (ref 27–38)
AST SERPL W P-5'-P-CCNC: 35 U/L (ref 9–39)
AST SERPL W P-5'-P-CCNC: 39 U/L (ref 9–39)
BASOPHILS # BLD AUTO: 0 X10*3/UL (ref 0–0.1)
BASOPHILS NFR BLD AUTO: 0 %
BILIRUB SERPL-MCNC: 0.7 MG/DL (ref 0–1.2)
BILIRUB SERPL-MCNC: 0.7 MG/DL (ref 0–1.2)
BILIRUB UR STRIP.AUTO-MCNC: NEGATIVE MG/DL
BLOOD EXPIRATION DATE: NORMAL
BLOOD EXPIRATION DATE: NORMAL
BNP SERPL-MCNC: 30 PG/ML (ref 0–99)
BUN SERPL-MCNC: 20 MG/DL (ref 6–23)
BUN SERPL-MCNC: 20 MG/DL (ref 6–23)
CALCIUM SERPL-MCNC: 8.4 MG/DL (ref 8.6–10.3)
CALCIUM SERPL-MCNC: 8.6 MG/DL (ref 8.6–10.3)
CHLORIDE SERPL-SCNC: 101 MMOL/L (ref 98–107)
CHLORIDE SERPL-SCNC: 103 MMOL/L (ref 98–107)
CHROM ANALY OVERALL INTERP-IMP: NORMAL
CO2 SERPL-SCNC: 27 MMOL/L (ref 21–32)
CO2 SERPL-SCNC: 31 MMOL/L (ref 21–32)
COLOR UR: YELLOW
CREAT SERPL-MCNC: 1.21 MG/DL (ref 0.5–1.3)
CREAT SERPL-MCNC: 1.45 MG/DL (ref 0.5–1.3)
DISPENSE STATUS: NORMAL
DISPENSE STATUS: NORMAL
EGFRCR SERPLBLD CKD-EPI 2021: 53 ML/MIN/1.73M*2
EGFRCR SERPLBLD CKD-EPI 2021: 66 ML/MIN/1.73M*2
ELECTRONICALLY COSIGNED BY CYTOGENETICS: NORMAL
ELECTRONICALLY SIGNED BY CYTOGENETICS: NORMAL
EOSINOPHIL # BLD AUTO: 0.01 X10*3/UL (ref 0–0.7)
EOSINOPHIL NFR BLD AUTO: 0.6 %
ERYTHROCYTE [DISTWIDTH] IN BLOOD BY AUTOMATED COUNT: 15 % (ref 11.5–14.5)
ERYTHROCYTE [DISTWIDTH] IN BLOOD BY AUTOMATED COUNT: 15.8 % (ref 11.5–14.5)
FIBRINOGEN PPP-MCNC: 272 MG/DL (ref 200–400)
FISH ISCN RESULTS: NORMAL
GLUCOSE SERPL-MCNC: 103 MG/DL (ref 74–99)
GLUCOSE SERPL-MCNC: 116 MG/DL (ref 74–99)
GLUCOSE UR STRIP.AUTO-MCNC: NORMAL MG/DL
HCT VFR BLD AUTO: 18.7 % (ref 41–52)
HCT VFR BLD AUTO: 20.1 % (ref 41–52)
HGB BLD-MCNC: 6.1 G/DL (ref 13.5–17.5)
HGB BLD-MCNC: 6.7 G/DL (ref 13.5–17.5)
HOLD SPECIMEN: NORMAL
IMM GRANULOCYTES # BLD AUTO: 0 X10*3/UL (ref 0–0.7)
IMM GRANULOCYTES NFR BLD AUTO: 0 % (ref 0–0.9)
INR PPP: 1.2 (ref 0.9–1.1)
KETONES UR STRIP.AUTO-MCNC: NEGATIVE MG/DL
LEUKOCYTE ESTERASE UR QL STRIP.AUTO: NEGATIVE
LYMPHOCYTES # BLD AUTO: 1.36 X10*3/UL (ref 1.2–4.8)
LYMPHOCYTES NFR BLD AUTO: 77.7 %
MCH RBC QN AUTO: 34.5 PG (ref 26–34)
MCH RBC QN AUTO: 36.1 PG (ref 26–34)
MCHC RBC AUTO-ENTMCNC: 32.6 G/DL (ref 32–36)
MCHC RBC AUTO-ENTMCNC: 33.3 G/DL (ref 32–36)
MCV RBC AUTO: 104 FL (ref 80–100)
MCV RBC AUTO: 111 FL (ref 80–100)
MONOCYTES # BLD AUTO: 0.12 X10*3/UL (ref 0.1–1)
MONOCYTES NFR BLD AUTO: 6.9 %
MUCOUS THREADS #/AREA URNS AUTO: NORMAL /LPF
NEUTROPHILS # BLD AUTO: 0.26 X10*3/UL (ref 1.2–7.7)
NEUTROPHILS NFR BLD AUTO: 14.8 %
NITRITE UR QL STRIP.AUTO: NEGATIVE
NRBC BLD-RTO: 2.9 /100 WBCS (ref 0–0)
NRBC BLD-RTO: 4.1 /100 WBCS (ref 0–0)
PH UR STRIP.AUTO: 6.5 [PH]
PLATELET # BLD AUTO: 20 X10*3/UL (ref 150–450)
PLATELET # BLD AUTO: 33 X10*3/UL (ref 150–450)
POTASSIUM SERPL-SCNC: 3.7 MMOL/L (ref 3.5–5.3)
POTASSIUM SERPL-SCNC: 3.7 MMOL/L (ref 3.5–5.3)
PRODUCT BLOOD TYPE: 5100
PRODUCT BLOOD TYPE: 5100
PRODUCT CODE: NORMAL
PRODUCT CODE: NORMAL
PROT SERPL-MCNC: 7.1 G/DL (ref 6.4–8.2)
PROT SERPL-MCNC: 7.1 G/DL (ref 6.4–8.2)
PROT UR STRIP.AUTO-MCNC: NEGATIVE MG/DL
PROTHROMBIN TIME: 13.1 SECONDS (ref 9.8–12.8)
RBC # BLD AUTO: 1.69 X10*6/UL (ref 4.5–5.9)
RBC # BLD AUTO: 1.94 X10*6/UL (ref 4.5–5.9)
RBC # UR STRIP.AUTO: ABNORMAL /UL
RBC #/AREA URNS AUTO: NORMAL /HPF
RH FACTOR (ANTIGEN D): NORMAL
RH FACTOR (ANTIGEN D): NORMAL
SODIUM SERPL-SCNC: 138 MMOL/L (ref 136–145)
SODIUM SERPL-SCNC: 139 MMOL/L (ref 136–145)
SP GR UR STRIP.AUTO: 1.02
UNIT ABO: NORMAL
UNIT ABO: NORMAL
UNIT NUMBER: NORMAL
UNIT NUMBER: NORMAL
UNIT RH: NORMAL
UNIT RH: NORMAL
UNIT VOLUME: 338
UNIT VOLUME: 350
UROBILINOGEN UR STRIP.AUTO-MCNC: ABNORMAL MG/DL
WBC # BLD AUTO: 1.5 X10*3/UL (ref 4.4–11.3)
WBC # BLD AUTO: 1.8 X10*3/UL (ref 4.4–11.3)
WBC #/AREA URNS AUTO: NORMAL /HPF
XM INTEP: NORMAL
XM INTEP: NORMAL

## 2024-11-15 PROCEDURE — 86901 BLOOD TYPING SEROLOGIC RH(D): CPT

## 2024-11-15 PROCEDURE — 85610 PROTHROMBIN TIME: CPT

## 2024-11-15 PROCEDURE — 36415 COLL VENOUS BLD VENIPUNCTURE: CPT

## 2024-11-15 PROCEDURE — 1200000002 HC GENERAL ROOM WITH TELEMETRY DAILY

## 2024-11-15 PROCEDURE — 99223 1ST HOSP IP/OBS HIGH 75: CPT | Performed by: INTERNAL MEDICINE

## 2024-11-15 PROCEDURE — 85730 THROMBOPLASTIN TIME PARTIAL: CPT

## 2024-11-15 PROCEDURE — 73130 X-RAY EXAM OF HAND: CPT | Mod: LT

## 2024-11-15 PROCEDURE — P9016 RBC LEUKOCYTES REDUCED: HCPCS

## 2024-11-15 PROCEDURE — 73110 X-RAY EXAM OF WRIST: CPT | Mod: LEFT SIDE | Performed by: RADIOLOGY

## 2024-11-15 PROCEDURE — 36430 TRANSFUSION BLD/BLD COMPNT: CPT

## 2024-11-15 PROCEDURE — 99232 SBSQ HOSP IP/OBS MODERATE 35: CPT | Performed by: INTERNAL MEDICINE

## 2024-11-15 PROCEDURE — 86920 COMPATIBILITY TEST SPIN: CPT

## 2024-11-15 PROCEDURE — 81001 URINALYSIS AUTO W/SCOPE: CPT | Performed by: STUDENT IN AN ORGANIZED HEALTH CARE EDUCATION/TRAINING PROGRAM

## 2024-11-15 PROCEDURE — 99222 1ST HOSP IP/OBS MODERATE 55: CPT | Performed by: INTERNAL MEDICINE

## 2024-11-15 PROCEDURE — 85384 FIBRINOGEN ACTIVITY: CPT

## 2024-11-15 PROCEDURE — 80053 COMPREHEN METABOLIC PANEL: CPT

## 2024-11-15 PROCEDURE — 73110 X-RAY EXAM OF WRIST: CPT | Mod: LT

## 2024-11-15 PROCEDURE — 85027 COMPLETE CBC AUTOMATED: CPT | Performed by: INTERNAL MEDICINE

## 2024-11-15 PROCEDURE — 83880 ASSAY OF NATRIURETIC PEPTIDE: CPT

## 2024-11-15 PROCEDURE — 85025 COMPLETE CBC W/AUTO DIFF WBC: CPT

## 2024-11-15 PROCEDURE — 11721 DEBRIDE NAIL 6 OR MORE: CPT | Performed by: PODIATRIST

## 2024-11-15 PROCEDURE — 73130 X-RAY EXAM OF HAND: CPT | Mod: LEFT SIDE | Performed by: RADIOLOGY

## 2024-11-15 PROCEDURE — 80053 COMPREHEN METABOLIC PANEL: CPT | Performed by: INTERNAL MEDICINE

## 2024-11-15 PROCEDURE — 2500000001 HC RX 250 WO HCPCS SELF ADMINISTERED DRUGS (ALT 637 FOR MEDICARE OP): Performed by: INTERNAL MEDICINE

## 2024-11-15 RX ORDER — VARENICLINE TARTRATE 0.5 MG/1
0.5 TABLET, FILM COATED ORAL 2 TIMES DAILY
COMMUNITY

## 2024-11-15 RX ORDER — PANTOPRAZOLE SODIUM 40 MG/1
40 TABLET, DELAYED RELEASE ORAL
Status: DISCONTINUED | OUTPATIENT
Start: 2024-11-15 | End: 2024-11-16 | Stop reason: HOSPADM

## 2024-11-15 RX ORDER — METOCLOPRAMIDE 10 MG/1
10 TABLET ORAL EVERY 6 HOURS PRN
Status: DISCONTINUED | OUTPATIENT
Start: 2024-11-15 | End: 2024-11-16 | Stop reason: HOSPADM

## 2024-11-15 RX ORDER — ONDANSETRON 4 MG/1
4 TABLET, ORALLY DISINTEGRATING ORAL EVERY 8 HOURS PRN
Status: DISCONTINUED | OUTPATIENT
Start: 2024-11-15 | End: 2024-11-16 | Stop reason: HOSPADM

## 2024-11-15 RX ORDER — ACETAMINOPHEN 160 MG/5ML
650 SOLUTION ORAL EVERY 4 HOURS PRN
Status: DISCONTINUED | OUTPATIENT
Start: 2024-11-15 | End: 2024-11-16 | Stop reason: HOSPADM

## 2024-11-15 RX ORDER — METOCLOPRAMIDE HYDROCHLORIDE 5 MG/ML
10 INJECTION INTRAMUSCULAR; INTRAVENOUS EVERY 6 HOURS PRN
Status: DISCONTINUED | OUTPATIENT
Start: 2024-11-15 | End: 2024-11-16 | Stop reason: HOSPADM

## 2024-11-15 RX ORDER — TALC
3 POWDER (GRAM) TOPICAL NIGHTLY PRN
Status: DISCONTINUED | OUTPATIENT
Start: 2024-11-15 | End: 2024-11-16 | Stop reason: HOSPADM

## 2024-11-15 RX ORDER — VARENICLINE TARTRATE 0.5 MG/1
0.5 TABLET, FILM COATED ORAL 2 TIMES DAILY
Status: DISCONTINUED | OUTPATIENT
Start: 2024-11-15 | End: 2024-11-15

## 2024-11-15 RX ORDER — ACETAMINOPHEN 325 MG/1
650 TABLET ORAL EVERY 4 HOURS PRN
Status: DISCONTINUED | OUTPATIENT
Start: 2024-11-15 | End: 2024-11-16 | Stop reason: HOSPADM

## 2024-11-15 RX ORDER — ONDANSETRON HYDROCHLORIDE 2 MG/ML
4 INJECTION, SOLUTION INTRAVENOUS EVERY 8 HOURS PRN
Status: DISCONTINUED | OUTPATIENT
Start: 2024-11-15 | End: 2024-11-16 | Stop reason: HOSPADM

## 2024-11-15 RX ORDER — ACETAMINOPHEN 650 MG/1
650 SUPPOSITORY RECTAL EVERY 4 HOURS PRN
Status: DISCONTINUED | OUTPATIENT
Start: 2024-11-15 | End: 2024-11-16 | Stop reason: HOSPADM

## 2024-11-15 RX ORDER — PANTOPRAZOLE SODIUM 40 MG/10ML
40 INJECTION, POWDER, LYOPHILIZED, FOR SOLUTION INTRAVENOUS
Status: DISCONTINUED | OUTPATIENT
Start: 2024-11-15 | End: 2024-11-16 | Stop reason: HOSPADM

## 2024-11-15 RX ORDER — OXYCODONE AND ACETAMINOPHEN 5; 325 MG/1; MG/1
1 TABLET ORAL EVERY 4 HOURS PRN
Status: DISCONTINUED | OUTPATIENT
Start: 2024-11-15 | End: 2024-11-16 | Stop reason: HOSPADM

## 2024-11-15 SDOH — SOCIAL STABILITY: SOCIAL INSECURITY
WITHIN THE LAST YEAR, HAVE YOU BEEN KICKED, HIT, SLAPPED, OR OTHERWISE PHYSICALLY HURT BY YOUR PARTNER OR EX-PARTNER?: NO

## 2024-11-15 SDOH — SOCIAL STABILITY: SOCIAL INSECURITY: HAVE YOU HAD ANY THOUGHTS OF HARMING ANYONE ELSE?: NO

## 2024-11-15 SDOH — SOCIAL STABILITY: SOCIAL INSECURITY: WERE YOU ABLE TO COMPLETE ALL THE BEHAVIORAL HEALTH SCREENINGS?: YES

## 2024-11-15 SDOH — SOCIAL STABILITY: SOCIAL INSECURITY: WITHIN THE LAST YEAR, HAVE YOU BEEN AFRAID OF YOUR PARTNER OR EX-PARTNER?: NO

## 2024-11-15 SDOH — ECONOMIC STABILITY: INCOME INSECURITY: IN THE PAST 12 MONTHS HAS THE ELECTRIC, GAS, OIL, OR WATER COMPANY THREATENED TO SHUT OFF SERVICES IN YOUR HOME?: NO

## 2024-11-15 SDOH — ECONOMIC STABILITY: HOUSING INSECURITY: IN THE PAST 12 MONTHS, HOW MANY TIMES HAVE YOU MOVED WHERE YOU WERE LIVING?: 0

## 2024-11-15 SDOH — ECONOMIC STABILITY: TRANSPORTATION INSECURITY: IN THE PAST 12 MONTHS, HAS LACK OF TRANSPORTATION KEPT YOU FROM MEDICAL APPOINTMENTS OR FROM GETTING MEDICATIONS?: NO

## 2024-11-15 SDOH — SOCIAL STABILITY: SOCIAL INSECURITY: ARE YOU OR HAVE YOU BEEN THREATENED OR ABUSED PHYSICALLY, EMOTIONALLY, OR SEXUALLY BY ANYONE?: NO

## 2024-11-15 SDOH — SOCIAL STABILITY: SOCIAL INSECURITY: WITHIN THE LAST YEAR, HAVE YOU BEEN HUMILIATED OR EMOTIONALLY ABUSED IN OTHER WAYS BY YOUR PARTNER OR EX-PARTNER?: NO

## 2024-11-15 SDOH — SOCIAL STABILITY: SOCIAL INSECURITY: HAVE YOU HAD THOUGHTS OF HARMING ANYONE ELSE?: NO

## 2024-11-15 SDOH — ECONOMIC STABILITY: HOUSING INSECURITY: IN THE LAST 12 MONTHS, WAS THERE A TIME WHEN YOU WERE NOT ABLE TO PAY THE MORTGAGE OR RENT ON TIME?: NO

## 2024-11-15 SDOH — ECONOMIC STABILITY: FOOD INSECURITY: WITHIN THE PAST 12 MONTHS, YOU WORRIED THAT YOUR FOOD WOULD RUN OUT BEFORE YOU GOT THE MONEY TO BUY MORE.: NEVER TRUE

## 2024-11-15 SDOH — SOCIAL STABILITY: SOCIAL INSECURITY: ARE THERE ANY APPARENT SIGNS OF INJURIES/BEHAVIORS THAT COULD BE RELATED TO ABUSE/NEGLECT?: NO

## 2024-11-15 SDOH — SOCIAL STABILITY: SOCIAL INSECURITY: HAS ANYONE EVER THREATENED TO HURT YOUR FAMILY OR YOUR PETS?: NO

## 2024-11-15 SDOH — ECONOMIC STABILITY: HOUSING INSECURITY: AT ANY TIME IN THE PAST 12 MONTHS, WERE YOU HOMELESS OR LIVING IN A SHELTER (INCLUDING NOW)?: NO

## 2024-11-15 SDOH — ECONOMIC STABILITY: FOOD INSECURITY: HOW HARD IS IT FOR YOU TO PAY FOR THE VERY BASICS LIKE FOOD, HOUSING, MEDICAL CARE, AND HEATING?: NOT HARD AT ALL

## 2024-11-15 SDOH — ECONOMIC STABILITY: FOOD INSECURITY: WITHIN THE PAST 12 MONTHS, THE FOOD YOU BOUGHT JUST DIDN'T LAST AND YOU DIDN'T HAVE MONEY TO GET MORE.: NEVER TRUE

## 2024-11-15 SDOH — SOCIAL STABILITY: SOCIAL INSECURITY: DOES ANYONE TRY TO KEEP YOU FROM HAVING/CONTACTING OTHER FRIENDS OR DOING THINGS OUTSIDE YOUR HOME?: NO

## 2024-11-15 SDOH — SOCIAL STABILITY: SOCIAL INSECURITY
WITHIN THE LAST YEAR, HAVE YOU BEEN RAPED OR FORCED TO HAVE ANY KIND OF SEXUAL ACTIVITY BY YOUR PARTNER OR EX-PARTNER?: NO

## 2024-11-15 SDOH — SOCIAL STABILITY: SOCIAL INSECURITY: DO YOU FEEL UNSAFE GOING BACK TO THE PLACE WHERE YOU ARE LIVING?: NO

## 2024-11-15 SDOH — SOCIAL STABILITY: SOCIAL INSECURITY: DO YOU FEEL ANYONE HAS EXPLOITED OR TAKEN ADVANTAGE OF YOU FINANCIALLY OR OF YOUR PERSONAL PROPERTY?: NO

## 2024-11-15 SDOH — SOCIAL STABILITY: SOCIAL INSECURITY: ABUSE: ADULT

## 2024-11-15 ASSESSMENT — COGNITIVE AND FUNCTIONAL STATUS - GENERAL
PATIENT BASELINE BEDBOUND: NO
MOBILITY SCORE: 24
DAILY ACTIVITIY SCORE: 24

## 2024-11-15 ASSESSMENT — LIFESTYLE VARIABLES
EVER HAD A DRINK FIRST THING IN THE MORNING TO STEADY YOUR NERVES TO GET RID OF A HANGOVER: NO
HOW OFTEN DO YOU HAVE 6 OR MORE DRINKS ON ONE OCCASION: NEVER
SKIP TO QUESTIONS 9-10: 1
PRESCIPTION_ABUSE_PAST_12_MONTHS: NO
HAVE PEOPLE ANNOYED YOU BY CRITICIZING YOUR DRINKING: NO
TOTAL SCORE: 0
SUBSTANCE_ABUSE_PAST_12_MONTHS: YES
AUDIT-C TOTAL SCORE: 0
HOW OFTEN DO YOU HAVE A DRINK CONTAINING ALCOHOL: NEVER
AUDIT-C TOTAL SCORE: 0
HAVE YOU EVER FELT YOU SHOULD CUT DOWN ON YOUR DRINKING: NO
EVER FELT BAD OR GUILTY ABOUT YOUR DRINKING: NO
HOW MANY STANDARD DRINKS CONTAINING ALCOHOL DO YOU HAVE ON A TYPICAL DAY: PATIENT DOES NOT DRINK

## 2024-11-15 ASSESSMENT — PAIN DESCRIPTION - LOCATION
LOCATION: HAND
LOCATION: HAND

## 2024-11-15 ASSESSMENT — PAIN - FUNCTIONAL ASSESSMENT
PAIN_FUNCTIONAL_ASSESSMENT: 0-10

## 2024-11-15 ASSESSMENT — ACTIVITIES OF DAILY LIVING (ADL)
LACK_OF_TRANSPORTATION: NO
BATHING: INDEPENDENT
DRESSING YOURSELF: INDEPENDENT
TOILETING: INDEPENDENT
LACK_OF_TRANSPORTATION: NO
WALKS IN HOME: INDEPENDENT
HEARING - RIGHT EAR: FUNCTIONAL
JUDGMENT_ADEQUATE_SAFELY_COMPLETE_DAILY_ACTIVITIES: YES
HEARING - LEFT EAR: FUNCTIONAL
FEEDING YOURSELF: INDEPENDENT
GROOMING: INDEPENDENT
ADEQUATE_TO_COMPLETE_ADL: YES
LACK_OF_TRANSPORTATION: NO
PATIENT'S MEMORY ADEQUATE TO SAFELY COMPLETE DAILY ACTIVITIES?: YES

## 2024-11-15 ASSESSMENT — ENCOUNTER SYMPTOMS
SHORTNESS OF BREATH: 1
WEAKNESS: 1
JOINT SWELLING: 1

## 2024-11-15 ASSESSMENT — PAIN DESCRIPTION - DESCRIPTORS: DESCRIPTORS: ACHING

## 2024-11-15 ASSESSMENT — PATIENT HEALTH QUESTIONNAIRE - PHQ9
SUM OF ALL RESPONSES TO PHQ9 QUESTIONS 1 & 2: 0
1. LITTLE INTEREST OR PLEASURE IN DOING THINGS: NOT AT ALL
2. FEELING DOWN, DEPRESSED OR HOPELESS: NOT AT ALL

## 2024-11-15 ASSESSMENT — PAIN DESCRIPTION - ORIENTATION: ORIENTATION: LEFT

## 2024-11-15 ASSESSMENT — PAIN SCALES - GENERAL
PAINLEVEL_OUTOF10: 7
PAINLEVEL_OUTOF10: 0 - NO PAIN
PAINLEVEL_OUTOF10: 8
PAINLEVEL_OUTOF10: 2
PAINLEVEL_OUTOF10: 0 - NO PAIN
PAINLEVEL_OUTOF10: 3

## 2024-11-15 NOTE — CONSULTS
Reason For Consult      History Of Present Illness  Jaycee Harp is a 64 y.o. male presenting with worsening anemia.  Patient has known pancytopenia and worsening counts recently.  He underwent a bone marrow biopsy on 11/1/2024 consistent with myelofibrosis and was discussed at tumor board yesterday with recommendation to start momelotinib + LEIF.  He was plan to meet his outpatient oncologist Dr. Beebe today however due to worsening counts presented to the emergency room and noted to have a hemoglobin of 6.1 at admission.  He was having profound dizziness and lightheadedness.     Past Medical History  He has a past medical history of Contact with and (suspected) exposure to covid-19 (08/28/2020), Hematuria (10/12/2023), Personal history of other endocrine, nutritional and metabolic disease (01/13/2021), and Personal history of other specified conditions (08/28/2020).    Surgical History  He has a past surgical history that includes Other surgical history (10/31/2019).     Social History  He reports that he quit smoking about 53 years ago. His smoking use included cigarettes. He started smoking about 7 weeks ago. He has been exposed to tobacco smoke. He has quit using smokeless tobacco. He reports that he does not currently use alcohol after a past usage of about 24.0 standard drinks of alcohol per week. He reports current drug use. Drug: Marijuana.    Family History  Family History   Problem Relation Name Age of Onset    Diabetes Mother      Other (varicose veins) Mother      Liver cancer Mother      Diabetes Father      Tuberculosis Father          Allergies  Patient has no known allergies.    Review of Systems  Negative outside of HPI       Physical Exam  Gen: NAD  HEENT: atraumatic head, normocephalic, EOMI, conjuctiva normal  LUNG: no increased WOB  CV: No JVD. RRR  GI: soft, NT, ND  LE: no LE edema  Skin: no obvious rashes or lesions  Neuro: interactive, no focal deficits, limited in setting of acute  "illness  Psych: difficult to participate, normal affect         Last Recorded Vitals  Blood pressure 154/68, pulse 76, temperature 36.1 °C (97 °F), temperature source Temporal, resp. rate 18, height 1.778 m (5' 10\"), weight 134 kg (296 lb), SpO2 98%.    Relevant Results  Results for orders placed or performed during the hospital encounter of 11/14/24 (from the past 24 hours)   Prepare RBC: 1 Units   Result Value Ref Range    PRODUCT CODE S1847H83     Unit Number [id number]     Unit ABO O     Unit RH POS     XM INTEP COMP     Dispense Status TR     Blood Expiration Date 12/6/2024 11:59:00 PM EST     PRODUCT BLOOD TYPE 5100     UNIT VOLUME 350    CBC and Auto Differential   Result Value Ref Range    WBC 1.8 (L) 4.4 - 11.3 x10*3/uL    nRBC 2.9 (H) 0.0 - 0.0 /100 WBCs    RBC 1.69 (L) 4.50 - 5.90 x10*6/uL    Hemoglobin 6.1 (LL) 13.5 - 17.5 g/dL    Hematocrit 18.7 (L) 41.0 - 52.0 %     (H) 80 - 100 fL    MCH 36.1 (H) 26.0 - 34.0 pg    MCHC 32.6 32.0 - 36.0 g/dL    RDW 15.0 (H) 11.5 - 14.5 %    Platelets 33 (LL) 150 - 450 x10*3/uL    Neutrophils % 14.8 40.0 - 80.0 %    Immature Granulocytes %, Automated 0.0 0.0 - 0.9 %    Lymphocytes % 77.7 13.0 - 44.0 %    Monocytes % 6.9 2.0 - 10.0 %    Eosinophils % 0.6 0.0 - 6.0 %    Basophils % 0.0 0.0 - 2.0 %    Neutrophils Absolute 0.26 (L) 1.20 - 7.70 x10*3/uL    Immature Granulocytes Absolute, Automated 0.00 0.00 - 0.70 x10*3/uL    Lymphocytes Absolute 1.36 1.20 - 4.80 x10*3/uL    Monocytes Absolute 0.12 0.10 - 1.00 x10*3/uL    Eosinophils Absolute 0.01 0.00 - 0.70 x10*3/uL    Basophils Absolute 0.00 0.00 - 0.10 x10*3/uL   Comprehensive metabolic panel   Result Value Ref Range    Glucose 116 (H) 74 - 99 mg/dL    Sodium 138 136 - 145 mmol/L    Potassium 3.7 3.5 - 5.3 mmol/L    Chloride 101 98 - 107 mmol/L    Bicarbonate 27 21 - 32 mmol/L    Anion Gap 14 10 - 20 mmol/L    Urea Nitrogen 20 6 - 23 mg/dL    Creatinine 1.45 (H) 0.50 - 1.30 mg/dL    eGFR 53 (L) >60 " mL/min/1.73m*2    Calcium 8.6 8.6 - 10.3 mg/dL    Albumin 3.9 3.4 - 5.0 g/dL    Alkaline Phosphatase 71 33 - 136 U/L    Total Protein 7.1 6.4 - 8.2 g/dL    AST 39 9 - 39 U/L    Bilirubin, Total 0.7 0.0 - 1.2 mg/dL    ALT 62 (H) 10 - 52 U/L   Protime-INR   Result Value Ref Range    Protime 13.1 (H) 9.8 - 12.8 seconds    INR 1.2 (H) 0.9 - 1.1   Type and Screen   Result Value Ref Range    ABO TYPE O     Rh TYPE POS     ANTIBODY SCREEN NEG    aPTT   Result Value Ref Range    aPTT 25 (L) 27 - 38 seconds   Fibrinogen   Result Value Ref Range    Fibrinogen 272 200 - 400 mg/dL   B-type natriuretic peptide   Result Value Ref Range    BNP 30 0 - 99 pg/mL   Red Top   Result Value Ref Range    Extra Tube Hold for add-ons.    VERIFY ABO/Rh Group Test   Result Value Ref Range    ABO TYPE O     Rh TYPE POS    Urinalysis with Reflex Microscopic   Result Value Ref Range    Color, Urine Yellow Light-Yellow, Yellow, Dark-Yellow    Appearance, Urine Clear Clear    Specific Gravity, Urine 1.021 1.005 - 1.035    pH, Urine 6.5 5.0, 5.5, 6.0, 6.5, 7.0, 7.5, 8.0    Protein, Urine NEGATIVE NEGATIVE, 10 (TRACE), 20 (TRACE) mg/dL    Glucose, Urine Normal Normal mg/dL    Blood, Urine 0.06 (1+) (A) NEGATIVE    Ketones, Urine NEGATIVE NEGATIVE mg/dL    Bilirubin, Urine NEGATIVE NEGATIVE    Urobilinogen, Urine 2 (1+) (A) Normal mg/dL    Nitrite, Urine NEGATIVE NEGATIVE    Leukocyte Esterase, Urine NEGATIVE NEGATIVE   Microscopic Only, Urine   Result Value Ref Range    WBC, Urine NONE 1-5, NONE /HPF    RBC, Urine 3-5 NONE, 1-2, 3-5 /HPF    Mucus, Urine FEW Reference range not established. /LPF   CBC   Result Value Ref Range    WBC 1.5 (L) 4.4 - 11.3 x10*3/uL    nRBC 4.1 (H) 0.0 - 0.0 /100 WBCs    RBC 1.94 (L) 4.50 - 5.90 x10*6/uL    Hemoglobin 6.7 (L) 13.5 - 17.5 g/dL    Hematocrit 20.1 (L) 41.0 - 52.0 %     (H) 80 - 100 fL    MCH 34.5 (H) 26.0 - 34.0 pg    MCHC 33.3 32.0 - 36.0 g/dL    RDW 15.8 (H) 11.5 - 14.5 %    Platelets 20 (LL) 150 -  450 x10*3/uL   Comprehensive metabolic panel   Result Value Ref Range    Glucose 103 (H) 74 - 99 mg/dL    Sodium 139 136 - 145 mmol/L    Potassium 3.7 3.5 - 5.3 mmol/L    Chloride 103 98 - 107 mmol/L    Bicarbonate 31 21 - 32 mmol/L    Anion Gap 9 (L) 10 - 20 mmol/L    Urea Nitrogen 20 6 - 23 mg/dL    Creatinine 1.21 0.50 - 1.30 mg/dL    eGFR 66 >60 mL/min/1.73m*2    Calcium 8.4 (L) 8.6 - 10.3 mg/dL    Albumin 3.7 3.4 - 5.0 g/dL    Alkaline Phosphatase 64 33 - 136 U/L    Total Protein 7.1 6.4 - 8.2 g/dL    AST 35 9 - 39 U/L    Bilirubin, Total 0.7 0.0 - 1.2 mg/dL    ALT 58 (H) 10 - 52 U/L   Prepare RBC: 1 Units   Result Value Ref Range    PRODUCT CODE E4518L35     Unit Number [id number]     Unit ABO O     Unit RH POS     XM INTEP COMP     Dispense Status TR     Blood Expiration Date 12/10/2024 11:59:00 PM EST     PRODUCT BLOOD TYPE 5100     UNIT VOLUME 338           Assessment/Plan     Primary myelofibrosis  Pancytopenia    Patient has persistent cytopenias related to myelofibrosis.  Discussed at our tumor board yesterday and recommendation is to be started on momelotinib + erythropoietin stimulating agent as an outpatient.  No further hematologic oncologic workup needed while inpatient.  Recommend supportive care and for transfusion for hemoglobin less than 7.  Will arrange outpatient follow-up with Dr. Beebe next week to initiate to discuss next steps in therapy when stable for discharge.    I spent 60 minutes in the professional and overall care of this patient.      Parisa Infante MD

## 2024-11-15 NOTE — TELEPHONE ENCOUNTER
The lab called regarding critical result on the patient with hemoglobin of 6.4 and platelets of 22,000.  I called and left a message on the patient's voicemail to call us back for instruction regarding the critical results since he did not answer.  I also attempted his HCA and there was no response.  I will send a message to his PCP Dr. Tucker who ordered the lab.

## 2024-11-15 NOTE — PROGRESS NOTES
Spiritual Care Visit    Clinical Encounter Type  Visited With: Patient  Routine Visit: Introduction              Sacramental Encounters  Sacrament of Sick-Anointing: Anointed                             Taxonomy  Intended Effects: Build relationship of care and support, Preserve dignity and respect, Establish rapport and connectedness  Methods: Offer spiritual/Latter day support, Assist with spiritual/Latter day practices  Interventions: Perform a Latter day rite or ritual, Share words of hope and inspiration

## 2024-11-15 NOTE — CONSULTS
Consults    Reason For Consult  Painful elongated nails    History Of Present Illness  Jaycee Harp is a 64 y.o. male presenting with painful toenails.     Past Medical History  He has a past medical history of Contact with and (suspected) exposure to covid-19 (08/28/2020), Hematuria (10/12/2023), Personal history of other endocrine, nutritional and metabolic disease (01/13/2021), and Personal history of other specified conditions (08/28/2020).    Surgical History  He has a past surgical history that includes Other surgical history (10/31/2019).     Social History  He reports that he quit smoking about 53 years ago. His smoking use included cigarettes. He started smoking about 7 weeks ago. He has been exposed to tobacco smoke. He has quit using smokeless tobacco. He reports that he does not currently use alcohol after a past usage of about 24.0 standard drinks of alcohol per week. He reports current drug use. Drug: Marijuana.    Family History  Family History   Problem Relation Name Age of Onset    Diabetes Mother      Other (varicose veins) Mother      Liver cancer Mother      Diabetes Father      Tuberculosis Father          Allergies  Patient has no known allergies.    Review of Systems    REVIEW OF SYSTEMS  GENERAL:  Negative for malaise, significant weight loss, fever  HEENT:  No changes in hearing or vision, no nose bleeds or other nasal problems and Negative for frequent or significant headaches  NECK:  Negative for lumps, goiter, pain and significant neck swelling  RESPIRATORY:  Negative for cough, wheezing and shortness of breath  CARDIOVASCULAR:  Negative for chest pain, leg swelling and palpitations  GI:  Negative for abdominal discomfort, blood in stools or black stools and change in bowel habits  :  Negative for dysuria, frequency and incontinence  MUSCULOSKELETAL:  Negative for joint pain or swelling, back pain, and muscle pain.  SKIN:  Negative for lesions, rash, and itching  PSYCH:  Negative for  "sleep disturbance, mood disorder and recent psychosocial stressors  HEMATOLOGY/LYMPHOLOGY:  Negative for prolonged bleeding, bruising easily, and swollen nodes.  ENDOCRINE:  Negative for cold or heat intolerance, polyuria, polydipsia and goiter  NEURO: Negative, denies any burning, tingling or numbness     Objective:   Vasc: DP and PT pulses are palpable bilateral.  CFT is less than 3 seconds bilateral.  Skin temperature is warm to cool proximal to distal bilateral.      Neuro:  Light touch is intact to the foot bilateral.    There is no clonus noted.  The hallux is downgoing bilateral.      Derm: Nails 1-5 bilateral are thickened elongated and crumbly with subungual debris skin is supple with normal texture and turgor noted.  Webspaces are clean, dry and intact bilateral.  There are no hyperkeratoses, ulcerations, verruca or other lesions noted.      Ortho: Muscle strength is 5/5 for all pedal groups tested.  Ankle joint, subtalar joint, 1st MPJ and lesser MPJ ROM is full and without pain or crepitus.  The foot type is rectus bilateral off weight bearing.  There are no structural deformities noted.       Physical Exam     Last Recorded Vitals  Blood pressure 154/68, pulse 76, temperature 36.1 °C (97 °F), temperature source Temporal, resp. rate 18, height 1.778 m (5' 10\"), weight 134 kg (296 lb), SpO2 98%.    Relevant Results       Assessment/Plan     Painful nail mycosis: Nails are debrided in length and thickness to avoid infection for pain relief    I spent 15 minutes in the professional and overall care of this patient.          "

## 2024-11-15 NOTE — PROGRESS NOTES
Physical Therapy                 Therapy Communication Note    Patient Name: Jaycee Harp  MRN: [id number]  Department: [facility] 3 S  Room: [facility]-A  Today's Date: 11/15/2024     Discipline: Physical Therapy    Missed Time: Attempt    Comment:  PT orders received, chart reviewed.  Pt with 6.7 Hgb this AM with orders for 1 unit PRBC.  Pt not appropriate for therapy eval at this time.  Will re-attempt as appropriate.

## 2024-11-15 NOTE — CARE PLAN
The patient's goals for the shift include      The clinical goals for the shift include Pt will remain free from injury      Problem: Fall/Injury  Goal: Be free from injury by end of the shift  Outcome: Progressing     Problem: Skin  Goal: Participates in plan/prevention/treatment measures  Outcome: Progressing

## 2024-11-15 NOTE — PROGRESS NOTES
11/15/24 1137   Discharge Planning   Living Arrangements Spouse/significant other   Support Systems Spouse/significant other   Assistance Needed none   Type of Residence Private residence   Home or Post Acute Services None   Expected Discharge Disposition Home   Does the patient need discharge transport arranged? No   Financial Resource Strain   How hard is it for you to pay for the very basics like food, housing, medical care, and heating? Not hard   Housing Stability   In the last 12 months, was there a time when you were not able to pay the mortgage or rent on time? N   In the past 12 months, how many times have you moved where you were living? 1   At any time in the past 12 months, were you homeless or living in a shelter (including now)? N   Transportation Needs   In the past 12 months, has lack of transportation kept you from medical appointments or from getting medications? no   In the past 12 months, has lack of transportation kept you from meetings, work, or from getting things needed for daily living? No     Met with patient at the bedside. Confirmed address and contacts. Patient lives at home with his significant other. States he is independent with all of his care and denies issue with transportation. PCP is Dr Tucker-elio regularly. Denies issue obtaining or affording medications. Feels he is managing well at home. Did state he feels he might need a walker upon dc. Will see pending progress.

## 2024-11-15 NOTE — ED PROCEDURE NOTE
Procedure  Critical Care    Performed by: Jonathan De Leon DO  Authorized by: Jonathan De Leon DO    Critical care provider statement:     Critical care time (minutes):  30    Critical care time was exclusive of:  Separately billable procedures and treating other patients    Critical care was necessary to treat or prevent imminent or life-threatening deterioration of the following conditions: Symptomatic anemia requiring blood transfusion.    Critical care was time spent personally by me on the following activities:  Evaluation of patient's response to treatment, examination of patient, review of old charts, re-evaluation of patient's condition, pulse oximetry, ordering and review of radiographic studies and ordering and review of laboratory studies    Care discussed with: admitting provider                 Jonathan De Leon DO  11/15/24 0553

## 2024-11-15 NOTE — H&P
History Of Present Illness  Jaycee Harp is a 64 y.o. male presenting with abnormal blood work and states that he was notified by his outpatient care team that his hemoglobin was low and that he should report to the closest emergency department for further evaluation.  Patient does have known pancytopenia.  He has had a progressive downtrend in his hemoglobin as well as WBC count and platelets.  He recently underwent bone marrow biopsy with concern for broad differential including MDS, hairy cell leukemia, possibly AML.  Bone marrow biopsy obtained 11/1/2024 notes hypercellular bone marrow with atypical megakaryocytes and increased fibrosis consistent with myeloproliferative neoplasm.  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  Final pathology remains pending.  These findings and their interpretation have not apparently been relayed to patient at this time.  Hemoglobin noted to be 6.1 at the time of admission.  Patient also endorsing intermittent episodes of epistaxis which have increased in frequency but tend to self resolve.  Also endorses dysuria with recent outpatient cystoscopy for gross hematuria.  Patient endorses persistence of symptoms.  Patient is also endorsing difficulty with ambulation.  Endorses profound dizziness and lightheadedness upon standing and states he feels unsteady on his feet.  Patient also endorses moderate left hand and wrist pain.  Although he did not disclose this to me, he did disclose to his bedside nurse that Saturday, 5 days prior to this current evaluation, he was given very upsetting family news and out of anger and frustration punched a wall.  Endorses difficulty making a closed fist.  Otherwise neurovascularly intact.  Admitted for further management.     Past Medical History  Past Medical History:   Diagnosis Date    Contact with and (suspected) exposure to covid-19 08/28/2020    Exposure to 2019 novel coronavirus    Hematuria 10/12/2023    Personal history of other  endocrine, nutritional and metabolic disease 01/13/2021    History of obesity    Personal history of other specified conditions 08/28/2020    History of nasal congestion       Surgical History  Past Surgical History:   Procedure Laterality Date    OTHER SURGICAL HISTORY  10/31/2019    Rectal surgery        Social History  He reports that he quit smoking about 53 years ago. His smoking use included cigarettes. He started smoking about 7 weeks ago. He has been exposed to tobacco smoke. He has quit using smokeless tobacco. He reports that he does not currently use alcohol after a past usage of about 24.0 standard drinks of alcohol per week. He reports current drug use. Drug: Marijuana.    Family History  Family History   Problem Relation Name Age of Onset    Diabetes Mother      Other (varicose veins) Mother      Liver cancer Mother      Diabetes Father      Tuberculosis Father          Allergies  Patient has no known allergies.    Review of Systems   Respiratory:  Positive for shortness of breath.    Cardiovascular:  Negative for chest pain and leg swelling.   Musculoskeletal:  Positive for joint swelling.   Neurological:  Positive for weakness.        Physical Exam  Vitals reviewed.   Constitutional:       Appearance: Normal appearance.   HENT:      Head: Normocephalic and atraumatic.      Nose: Nose normal.      Mouth/Throat:      Mouth: Mucous membranes are moist.   Eyes:      Extraocular Movements: Extraocular movements intact.      Conjunctiva/sclera: Conjunctivae normal.      Pupils: Pupils are equal, round, and reactive to light.   Cardiovascular:      Rate and Rhythm: Normal rate and regular rhythm.      Pulses: Normal pulses.      Heart sounds: Normal heart sounds.   Pulmonary:      Effort: Pulmonary effort is normal.      Breath sounds: Wheezing present.   Abdominal:      General: Bowel sounds are normal.      Palpations: Abdomen is soft.   Musculoskeletal:         General: Normal range of motion.       "Cervical back: Normal range of motion and neck supple.      Comments: Ecchymosis over the proximal interphalangeal joint involving the second and third digit of his left hand.  Unable to make a tightly closed fist.  Normal capillary refill.  Neurovascularly intact.   Skin:     General: Skin is warm and dry.      Comments: Severe onychomycosis involving both his left and right feet, all 10 digits   Neurological:      General: No focal deficit present.      Mental Status: He is alert. Mental status is at baseline.   Psychiatric:         Mood and Affect: Mood normal.         Behavior: Behavior normal.          Last Recorded Vitals  Blood pressure 140/73, pulse 86, temperature 36.6 °C (97.9 °F), temperature source Temporal, resp. rate (!) 22, height 1.753 m (5' 9\"), weight 137 kg (303 lb), SpO2 96%.    Relevant Results  Scheduled medications  pantoprazole, 40 mg, oral, Daily before breakfast   Or  pantoprazole, 40 mg, intravenous, Daily before breakfast      Continuous medications     PRN medications  PRN medications: acetaminophen **OR** acetaminophen **OR** acetaminophen, acetaminophen **OR** acetaminophen **OR** acetaminophen, melatonin, metoclopramide **OR** metoclopramide, ondansetron ODT **OR** ondansetron, oxyCODONE-acetaminophen  CT urography w 3D volume rendered imaging    Result Date: 11/8/2024  Interpreted By:  Alem Gibbs, STUDY: CT UROGRAPHY WITH 3D VOLUME RENDERED IMAGING;  11/7/2024 9:08 am   INDICATION: Signs/Symptoms:hematuria.   COMPARISON: PET-CT 10/07/2024   ACCESSION NUMBER(S): [id number]   ORDERING CLINICIAN: GABBIE CORNEJO   TECHNIQUE: CT of the abdomen and pelvis was performed.  TRIPLE PHASE TECHNIQUE: Contiguous axial images of the abdomen and pelvis were obtained at 3mm slice thickness before, 110sec and 10min after intravenous contrast administration. Oral ingestion of up to 900 ml or water was encouraged prior to the study. Multiplanar reformats in coronal and sagittal reconstructions at " 3 mm slice thickness were performed. 3D reconstructions were performed on an independent workstation and provided for review. 100 ml of contrast Omnipaque 350 were administered intravenously without immediate complication.   FINDINGS: LOWER CHEST: Mild dependent atelectasis in the right lower lobe. Redemonstrated focal cystic change in the subpleural region of posterior left lower lobe. Lingular atelectasis/scarring. No pleural effusion.   ABDOMEN:   KIDNEYS AND URETERS: There is a nonobstructing calculus in the left lower pole measuring 9 mm. No right renal calculus or hydronephrosis in either kidney. There is a 4.3 cm cyst in the posterior left lower pole. A few subcentimeter left upper pole lesions too small to characterize likely benign. There is no suspicious filling defects in the bilateral collecting systems to suggest upper tract disease.   BLADDER: No wall thickening or calculus.   LIVER: No mass. Steatosis.   BILE DUCTS: No intrahepatic or extrahepatic bile duct dilation.   GALLBLADDER: No cholelithiasis or acute inflammation.   PANCREAS: No mass or duct dilation.   SPLEEN: No mass. No splenomegaly.   ADRENAL GLANDS: No mass.   PELVIS:   BOWEL: No bowel dilation or wall thickening. The appendix is normal.   VESSELS: Mild abdominal aortic atherosclerotic calcifications without aneurysmal dilation.   PERITONEUM/RETROPERITONEUM/LYMPH NODES: No free air, free fluid or fluid collection. No lymphadenopathy.   BONES AND ABDOMINAL WALL: Multilevel degenerative disc disease. Bilateral hip osteoarthritis.         9 mm nonobstructing left lower pole renal calculus. No right renal calculus or hydronephrosis in either kidney. No suspicious bladder mass or evidence of upper tract disease.   MACRO: None   Signed by: Alem Gibbs 11/8/2024 10:37 AM Dictation workstation:   [id number]    Complete Pulmonary Function Test Pre/Post Bronchodialator (Spirometry Pre/Post/DLCO/Lung Volumes)    Result Date: 11/4/2024  The  FEV1/FVC (0.73) is normal. The FEV1 (1.73L/55% is moderately reduced. The FVC is moderately reduced. Following administration of bronchodilators, there is no significant response. The TLC by body plethysmography is reduced, suggesting a moderate restrictive defect. The DLCO (55%)s moderately reduced; however, the diffusing capacity was not corrected for the patient's hemoglobin. The airways resistance is normal.   Conclusion: Spirometry and lung volumes by plethysmography indicate a complex restrictive ventilatory impairment. DLCO values are consistent with loss of alveoli capillary structure with loss of lung volume ? e.g., emphysema or ILD. Patient has adequate preoperative lung reserves for lobectomy but somewhat borderline lung disease for a pneumonectomy.  Please correlate clinically.    CT cardiac scoring wo IV contrast    Addendum Date: 10/28/2024    Interpreted By:  Osvaldo Alcantara, ADDENDUM: NON-CARDIOVASCULAR FINDINGS   INCLUDED LUNGS, AIRWAYS AND PLEURA Endotracheal / endobronchial lesion: Negative Nodule: Negative Airspace disease: Negative Pleural effusion: Negative Pneumothorax: Negative Other: No acute or contributory unanticipated findings   INCLUDED NON-CARDIOVASCULAR CORINNE AND MEDIASTINUM Adenopathy: Negative Included esophagus: Unremarkable   Other: Significant motion artifact limits this exam. Scattered bands of atelectasis or scarring. The large right upper lobe nodule noted on 3 October 2024 is in a portion of the lungs not included in the field of view on today's limited field of view CT chest   INCLUDED BONES: No acute skeletal findings, noting less sensitivity and specificity without dedicated sagittal and coronal reformatted series.   INCLUDED CHEST WALL No acute or contributory unanticipated findings   INCLUDED UPPER ABDOMEN No acute or contributory unanticipated findings   -------   NON-CARDIOVASCULAR IMPRESSION   NO ACUTE OR CONTRIBUTORY UNEXPECTED FINDINGS OF THE INCLUDED NON-CARDIOVASCULAR  STRUCTURES   NOTE THIS ADDENDUM IS SOLELY FOR INTERPRETATION OF ANATOMY OUTSIDE THE CARDIOVASCULAR SYSTEM. INTERPRETATION OF AND REPORTING OF THE CARDIOVASCULAR STRUCTURES ARE THE SOLE RESPONSIBILITY OF THE CARDIOLOGIST SUBMITTING THE ORIGINAL REPORT (NOT THIS ADDENDUM)   Signed by: Osvaldo Alcantara 10/28/2024 9:29 AM   -------- ORIGINAL REPORT -------- Dictation workstation:   [id number]    Result Date: 10/28/2024  Interpreted By:  Carmelo Peña, STUDY: CT CARDIAC SCORING WO IV CONTRAST; 10/26/2024 8:44 am   INDICATION: Signs/Symptoms:cad.   COMPARISON: None.   ACCESSION NUMBER(S): [id number]   ORDERING CLINICIAN: NEEL FISCHER   TECHNIQUE: Using prospective ECG gating, CT scan of the coronary arteries was performed without intravenous contrast. Coronary calcium scoring  was performed according to the method of Agatston.   CT Dose-Length Product (DLP): 80 mGy*cm CT Dose Reduction Employed: Yes, prospective gating, iterative reconstruction.   FINDINGS: The score and distribution of calcium in the coronary arteries is as follows:   LM             0 LAD           0 LCx            0 RCA           0   Total         0   The visualized mid/lower ascending thoracic aorta measures 3.4 cm in diameter. The heart is normal in size. No pericardial effusion is present.       1. Coronary artery calcium score of  0*.   *Coronary artery calcium scoring may be helpful in predicting the risk for future coronary heart disease events.  According to the American College [employer] Foundation Clinical Expert Consensus Task Force, such testing provides important prognostic information in patients with more than one coronary heart disease risk factor. The coronary artery calcium score correlates with the annual risk of a non-fatal myocardial infarction or coronary heart disease death.   Coronary artery score            Annual Risk   0-99                               0.4% 100-399                          1.3% >400                      "         2.4%   These three \"breakpoints\" correspond to lower, intermediate and high risk states for future coronary events.  Such information should be used, along with appropriate clinical judgment, to make decisions regarding the intensity of risk factor management strategies to treat blood lipids and to modify other non-lipid coronary risk factors.   Reference: Daniel P et al. Circulation.  2007; 115:402-426   Reading Cardiologist: Dr. Carmelo Peña, Date: 10/28/2024 9:26 am   Signed by: Carmelo Peña 10/28/2024 9:26 AM Dictation workstation:   [id number]   Results for orders placed or performed during the hospital encounter of 11/14/24 (from the past 24 hours)   Prepare RBC: 1 Units   Result Value Ref Range    PRODUCT CODE T3514E26     Unit Number [id number]     Unit ABO O     Unit RH POS     XM INTEP COMP     Dispense Status IS     Blood Expiration Date 12/6/2024 11:59:00 PM EST     PRODUCT BLOOD TYPE 5100     UNIT VOLUME 350    CBC and Auto Differential   Result Value Ref Range    WBC 1.8 (L) 4.4 - 11.3 x10*3/uL    nRBC 2.9 (H) 0.0 - 0.0 /100 WBCs    RBC 1.69 (L) 4.50 - 5.90 x10*6/uL    Hemoglobin 6.1 (LL) 13.5 - 17.5 g/dL    Hematocrit 18.7 (L) 41.0 - 52.0 %     (H) 80 - 100 fL    MCH 36.1 (H) 26.0 - 34.0 pg    MCHC 32.6 32.0 - 36.0 g/dL    RDW 15.0 (H) 11.5 - 14.5 %    Platelets 33 (LL) 150 - 450 x10*3/uL    Neutrophils % 14.8 40.0 - 80.0 %    Immature Granulocytes %, Automated 0.0 0.0 - 0.9 %    Lymphocytes % 77.7 13.0 - 44.0 %    Monocytes % 6.9 2.0 - 10.0 %    Eosinophils % 0.6 0.0 - 6.0 %    Basophils % 0.0 0.0 - 2.0 %    Neutrophils Absolute 0.26 (L) 1.20 - 7.70 x10*3/uL    Immature Granulocytes Absolute, Automated 0.00 0.00 - 0.70 x10*3/uL    Lymphocytes Absolute 1.36 1.20 - 4.80 x10*3/uL    Monocytes Absolute 0.12 0.10 - 1.00 x10*3/uL    Eosinophils Absolute 0.01 0.00 - 0.70 x10*3/uL    Basophils Absolute 0.00 0.00 - 0.10 x10*3/uL   Comprehensive metabolic panel   Result Value Ref " Range    Glucose 116 (H) 74 - 99 mg/dL    Sodium 138 136 - 145 mmol/L    Potassium 3.7 3.5 - 5.3 mmol/L    Chloride 101 98 - 107 mmol/L    Bicarbonate 27 21 - 32 mmol/L    Anion Gap 14 10 - 20 mmol/L    Urea Nitrogen 20 6 - 23 mg/dL    Creatinine 1.45 (H) 0.50 - 1.30 mg/dL    eGFR 53 (L) >60 mL/min/1.73m*2    Calcium 8.6 8.6 - 10.3 mg/dL    Albumin 3.9 3.4 - 5.0 g/dL    Alkaline Phosphatase 71 33 - 136 U/L    Total Protein 7.1 6.4 - 8.2 g/dL    AST 39 9 - 39 U/L    Bilirubin, Total 0.7 0.0 - 1.2 mg/dL    ALT 62 (H) 10 - 52 U/L   Protime-INR   Result Value Ref Range    Protime 13.1 (H) 9.8 - 12.8 seconds    INR 1.2 (H) 0.9 - 1.1   Type and Screen   Result Value Ref Range    ABO TYPE O     Rh TYPE POS     ANTIBODY SCREEN NEG    aPTT   Result Value Ref Range    aPTT 25 (L) 27 - 38 seconds   Fibrinogen   Result Value Ref Range    Fibrinogen 272 200 - 400 mg/dL   B-type natriuretic peptide   Result Value Ref Range    BNP 30 0 - 99 pg/mL   Red Top   Result Value Ref Range    Extra Tube Hold for add-ons.    VERIFY ABO/Rh Group Test   Result Value Ref Range    ABO TYPE O     Rh TYPE POS    Urinalysis with Reflex Microscopic   Result Value Ref Range    Color, Urine Yellow Light-Yellow, Yellow, Dark-Yellow    Appearance, Urine Clear Clear    Specific Gravity, Urine 1.021 1.005 - 1.035    pH, Urine 6.5 5.0, 5.5, 6.0, 6.5, 7.0, 7.5, 8.0    Protein, Urine NEGATIVE NEGATIVE, 10 (TRACE), 20 (TRACE) mg/dL    Glucose, Urine Normal Normal mg/dL    Blood, Urine 0.06 (1+) (A) NEGATIVE    Ketones, Urine NEGATIVE NEGATIVE mg/dL    Bilirubin, Urine NEGATIVE NEGATIVE    Urobilinogen, Urine 2 (1+) (A) Normal mg/dL    Nitrite, Urine NEGATIVE NEGATIVE    Leukocyte Esterase, Urine NEGATIVE NEGATIVE   Microscopic Only, Urine   Result Value Ref Range    WBC, Urine NONE 1-5, NONE /HPF    RBC, Urine 3-5 NONE, 1-2, 3-5 /HPF    Mucus, Urine FEW Reference range not established. /LPF          Assessment/Plan   Assessment & Plan  Anemia due to bone  marrow failure, unspecified bone marrow failure type (Multi)    Hematuria    Epistaxis    Onychomycosis    Primary hypertension      Patient presents at the recommendation of his outpatient providers with blood work showing acute on chronic anemia, with blood count noted to be less than 7 and recommendation to pursue PRBC transfusion.  At the time of admission, hemoglobin noted to be 6.1.  Currently receiving his first unit of PRBC.     -Likely secondary to underlying bone marrow process.  Review of recent bone marrow biopsy suggest myeloproliferative neoplasm, favored to represent primary myelofibrosis.  Final pathology noted to be pending.  Tumor board convened to meet 11/14/2024.  These findings and recommendations have not been conveyed to patient at this time.  Discussed with patient the need for results to be shared and interpreted by his oncologist so that implications of the findings and potential treatment may be discussed at that time.  Patient  was in agreement.     -1 unit of PRBC to be transfused at this time.  We will obtain repeat blood work following initial transfusion.  I anticipate the patient will require more than 1 unit of PRBC and this was conveyed to patient during my examination.     -Also noted marked thrombocytopenia with platelet count of 33.  This has improved from his earlier platelet value of 22.  Patient does pose a high risk for spontaneous bleeding.  He does have a history of intracranial hemorrhage.  Currently, he is endorsing periodic epistaxis but has been difficult to control but has resolved on its own.  Also endorses dysuria with za hematuria that has improved but persists.  There is concern given his thrombocytopenia and reports of transient bleeding, that he may require platelet transfusion as well.  To be closely monitored clinically for signs of either uncontrolled epistaxis or za hematuria.  Would recommend platelet transfusion in the setting of active bleed.   Otherwise, maintain transfusion threshold of 10-20,000 platelets.     -Patient endorsing significant left hand pain and swelling after traumatic injury 5 days prior.  States he struck a wall out of anger and frustration due to difficult family news.  He is unable to make a closed fist.  There is obvious ecchymosis at the proximal interphalangeal joint of the second and third digits.  Remains neurovascularly intact.  X-rays of the left hand and wrist to be obtained at this time.  Percocet every 4 hours as needed for pain control.     -Patient also endorses difficulty with ambulation.  Patient's significant other who is present at bedside states that he has been unable to walk short distances without bracing himself and feeling as if he is going to pass out.  Considerable concern for his ability to be discharged home safely and complete his basic ADLs at a level in which she is accustomed to.  Will request PT/OT evaluation at this time for further recommendations.     -Also endorses considerable discomfort and difficulty with his ADLs and dressing due to what he describes as severely painful toenails.  There is clear fungal disease/onychomycosis noted involving all 10 nailbeds across both his left and right feet.  May require procedural intervention.  Consult placed to podiatry service.  Would otherwise consider oral terbinafine, 250 mg daily x 12 weeks.    Diet: Regular  Fluids: N/A  DVT prophylaxis: SCDs  Telemetry: Indicated per transfusion protocol    Home Medications: Pending     I spent >75 minutes in the professional and overall care of this patient.      Geoffrey Valadez, DO

## 2024-11-15 NOTE — ED PROVIDER NOTES
EMERGENCY DEPARTMENT ENCOUNTER      Pt Name: Jaycee Harp  MRN: [id number]  Birthdate 1960  Date of evaluation: 11/14/2024    HISTORY OF PRESENT ILLNESS    Jaycee Harp is an 64 y.o. male with history including possible malignant neoplasm of the upper lobe of the right lung on 3 L of oxygen, myelodysplastic syndrome, COPD, hyperlipidemia, diabetes, pancreatitis, prior alcohol use disorder, prior tobacco use disorder presenting to the emergency department for pancytopenia.  Patient received outpatient labs today and hemoglobin was 6.4.  And platelets were 22.  Patient endorses a nosebleed 2 days ago as well as a previous brain bleed after falling.  He is on no blood thinners.  At this time he has no acute complaints but does feel like he has had headaches and greater fatigue as of late.  Denies any current headache, chest pain, shortness of breath, abdominal pain, numbness or tingling down the arms or legs.  Does state he has hematuria however has not had any melena or blood in his bowel movements.      PAST MEDICAL HISTORY     Past Medical History:   Diagnosis Date    Contact with and (suspected) exposure to covid-19 08/28/2020    Exposure to 2019 novel coronavirus    Hematuria 10/12/2023    Personal history of other endocrine, nutritional and metabolic disease 01/13/2021    History of obesity    Personal history of other specified conditions 08/28/2020    History of nasal congestion       SURGICAL HISTORY       Past Surgical History:   Procedure Laterality Date    OTHER SURGICAL HISTORY  10/31/2019    Rectal surgery       CURRENT MEDICATIONS       Previous Medications    ALBUTEROL (PROAIR HFA) 90 MCG/ACTUATION INHALER    Inhale 2 puffs every 4 hours if needed for wheezing or shortness of breath.    LOSARTAN-HYDROCHLOROTHIAZIDE (HYZAAR) 50-12.5 MG TABLET    TAKE 1 TABLET BY MOUTH EVERY DAY    OMEPRAZOLE (PRILOSEC) 20 MG DR CAPSULE    Take 1 capsule (20 mg) by mouth 2 times a day. Do not crush or chew.    PHENTERMINE  (ADIPEX-P) 37.5 MG TABLET    Take 1 tablet (37.5 mg) by mouth once daily in the morning. Take before meals.    TADALAFIL (CIALIS) 20 MG TABLET    Take 1 tablet (20 mg) by mouth once daily as needed for erectile dysfunction.    VARENICLINE (CHANTIX) 1 MG TABLET    Take 1 tablet (1 mg) by mouth 2 times a day. Take with full glass of water. Do not fill before 2024.       ALLERGIES     Patient has no known allergies.    FAMILY HISTORY       Family History   Problem Relation Name Age of Onset    Diabetes Mother      Other (varicose veins) Mother      Liver cancer Mother      Diabetes Father      Tuberculosis Father          SOCIAL HISTORY       Social History     Socioeconomic History    Marital status:    Tobacco Use    Smoking status: Former     Types: Cigarettes     Start date: 2024     Quit date: 1971     Years since quittin.9     Passive exposure: Past    Smokeless tobacco: Former   Substance and Sexual Activity    Alcohol use: Not Currently     Alcohol/week: 24.0 standard drinks of alcohol     Types: 12 Cans of beer, 12 Shots of liquor per week    Drug use: Yes     Types: Marijuana     Comment: edibles    Sexual activity: Not Currently       PHYSICAL EXAM       ED Triage Vitals [24 2327]   Temperature Heart Rate Respirations BP   36.6 °C (97.9 °F) 98 20 124/61      Pulse Ox Temp Source Heart Rate Source Patient Position   96 % Temporal -- --      BP Location FiO2 (%)     Right arm --       Physical Exam  Constitutional:       Appearance: He is obese.   HENT:      Head: Normocephalic and atraumatic.      Nose: Nose normal.      Mouth/Throat:      Mouth: Mucous membranes are moist.   Eyes:      Pupils: Pupils are equal, round, and reactive to light.   Cardiovascular:      Rate and Rhythm: Normal rate and regular rhythm.      Pulses: Normal pulses.      Heart sounds: Normal heart sounds.   Pulmonary:      Effort: Pulmonary effort is normal.      Breath sounds: Normal breath sounds.    Abdominal:      General: Abdomen is flat and protuberant. Bowel sounds are normal.      Palpations: Abdomen is soft.   Musculoskeletal:         General: Normal range of motion.      Right lower leg: Edema present.      Left lower leg: Edema present.   Skin:     General: Skin is warm.      Capillary Refill: Capillary refill takes less than 2 seconds.   Neurological:      General: No focal deficit present.      Mental Status: He is alert and oriented to person, place, and time.          DIAGNOSTIC RESULTS     LABS:  Labs Reviewed   CBC WITH AUTO DIFFERENTIAL - Abnormal       Result Value    WBC 1.8 (*)     nRBC 2.9 (*)     RBC 1.69 (*)     Hemoglobin 6.1 (*)     Hematocrit 18.7 (*)      (*)     MCH 36.1 (*)     MCHC 32.6      RDW 15.0 (*)     Platelets 33 (*)     Neutrophils % 14.8      Immature Granulocytes %, Automated 0.0      Lymphocytes % 77.7      Monocytes % 6.9      Eosinophils % 0.6      Basophils % 0.0      Neutrophils Absolute 0.26 (*)     Immature Granulocytes Absolute, Automated 0.00      Lymphocytes Absolute 1.36      Monocytes Absolute 0.12      Eosinophils Absolute 0.01      Basophils Absolute 0.00     COMPREHENSIVE METABOLIC PANEL - Abnormal    Glucose 116 (*)     Sodium 138      Potassium 3.7      Chloride 101      Bicarbonate 27      Anion Gap 14      Urea Nitrogen 20      Creatinine 1.45 (*)     eGFR 53 (*)     Calcium 8.6      Albumin 3.9      Alkaline Phosphatase 71      Total Protein 7.1      AST 39      Bilirubin, Total 0.7      ALT 62 (*)    PROTIME-INR - Abnormal    Protime 13.1 (*)     INR 1.2 (*)    APTT - Abnormal    aPTT 25 (*)     Narrative:     The APTT is no longer used for monitoring Unfractionated Heparin Therapy. For monitoring Heparin Therapy, use the Heparin Assay.   FIBRINOGEN - Normal    Fibrinogen 272     B-TYPE NATRIURETIC PEPTIDE - Normal    BNP 30      Narrative:        <100 pg/mL - Heart failure unlikely  100-299 pg/mL - Intermediate probability of acute heart                   failure exacerbation. Correlate with clinical                  context and patient history.    >=300 pg/mL - Heart Failure likely. Correlate with clinical                  context and patient history.    BNP testing is performed using different testing methodology at [facility] than at other [facility] hospitals. Direct result comparisons should only be made within the same method.      TYPE AND SCREEN    ABO TYPE O      Rh TYPE POS      ANTIBODY SCREEN NEG     VERAB/VERIFY ABORH   PREPARE RBC       All other labs were within normal range or not returned as of this dictation.    Imaging  No orders to display        Procedures  Procedures     EMERGENCY DEPARTMENT COURSE/MDM:   Medical Decision Making  Patient is a 64-year-old male presenting to the emergency department after a hemoglobin of 6.4 on outpatient labs.  Patient has known lung malignancy and a recent bone marrow biopsy showed evidence of myelofibrosis versus myelodysplastic syndrome.  Workup to include CBC, CMP, fibrinogen, BNP, type and screen, PT/INR , APTT.  Workup revealed hemoglobin is now 6.1 as well as white blood cell count of 1.8 and platelets of 33.  His pancytopenia likely related to his myelodysplastic syndrome/myelofibrosis.  Patient to receive 1 unit of packed red blood cells after being consented for blood products.  Patient is otherwise asymptomatic however; given patient's previous fall with a previous subarachnoid bleed in 2021 and significant anemia will admit to trend his hemoglobin and make sure he is safe.  Patient discussed with Dr. Valadez and agreed to admit this patient.    ED Course as of 11/15/24 0209   Fri Nov 15, 2024   0051 CBC and Auto Differential(!!)  Hemoglobin of 6.1, white blood cell count of 1.8 and platelets of 33 consistent with pancytopenia. [IS]      ED Course User Index  [IS] Hugo Duran MD         Diagnoses as of 11/15/24 0209   Anemia due to bone marrow failure, unspecified bone marrow  failure type (Multi)        External records reviewed: recent inpatient, clinic, and prior ED notes  Labs and Diagnostic imaging independently reviewed/interpreted by me.    Patient plan, care, lab results and imaging were all discussed with attending.    ED Medications administered this visit:  Medications - No data to display  New Prescriptions from this visit:    New Prescriptions    No medications on file       (Please note that portions of this note were completed with a voice recognition program.  Efforts were made to edit the dictations but occasionally words are mis-transcribed.)     Hugo Duran MD  Resident  11/15/24 9513

## 2024-11-15 NOTE — ED TRIAGE NOTES
"Pt comes in from home for abnormal labs following PCP visit this afternoon. Pt states that he was instructed to come to the ER for evaluation.  Pt states that he has had low H/H and that he recently had a bone marrow biopsy \"to look for leukemia.\" Pt states that he also recently had blood in his urine, and subsequently had a cystoscopy to explore for metastasis (currently has lung CA).  Pt states that he has felt weak and more SOB recently . Pt states that about 2 weeks ago, he was started on 3L NC.  Pt alert and oriented. Denies any increased work of breathing while sitting. Denies any pain. States appetite and bladder/bowel or WNL.  "

## 2024-11-15 NOTE — PROGRESS NOTES
Jaycee Harp is a 64 y.o. male on day 0 of admission presenting with Anemia due to bone marrow failure, unspecified bone marrow failure type (Multi).      Subjective   Patient seen and evaluated.  Findings of the biopsy discussed.  Hemoglobin still not improved.  Talked about receiving another unit of packed RBC       Objective     Last Recorded Vitals  /63   Pulse 79   Temp 36.6 °C (97.9 °F) (Temporal)   Resp 18   Wt 134 kg (296 lb)   SpO2 94%   Intake/Output last 3 Shifts:    Intake/Output Summary (Last 24 hours) at 11/15/2024 1028  Last data filed at 11/15/2024 0900  Gross per 24 hour   Intake 590 ml   Output --   Net 590 ml       Admission Weight  Weight: 137 kg (303 lb) (11/14/24 2327)    Daily Weight  11/15/24 : 134 kg (296 lb)      Physical Exam:  General:  obese male not in acute distress on room air but coughing a little bit when talking.  HEENT: PERRLA, head intact and normocephalic  Neck: Normal to inspection  Lungs: Clear to auscultation, work of breathing within normal limit  Cardiac: Regular rate and rhythm  Abdomen: Soft nontender, positive bowel sounds  : Exam deferred  Skin: Intact  Hematology: No petechia or excessive ecchymosis  Musculoskeletal: Without significant trauma  Neurological: Alert awake oriented, no focal deficit, cranial nerves grossly intact  Psych: No suicidal ideation or homicidal ideation    Relevant Results  Scheduled medications  [Held by provider] losartan 50 mg, hydroCHLOROthiazide 12.5 mg for Hyzaar 50 mg-12.5 mg, , oral, Daily  pantoprazole, 40 mg, oral, Daily before breakfast   Or  pantoprazole, 40 mg, intravenous, Daily before breakfast      Continuous medications     PRN medications  PRN medications: acetaminophen **OR** acetaminophen **OR** acetaminophen, acetaminophen **OR** acetaminophen **OR** acetaminophen, melatonin, metoclopramide **OR** metoclopramide, ondansetron ODT **OR** ondansetron, oxyCODONE-acetaminophen   Labs  Results from last 7 days   Lab  Units 11/15/24  0710 11/15/24  0005 11/14/24  1458   WBC AUTO x10*3/uL 1.5* 1.8* 1.9*   HEMOGLOBIN g/dL 6.7* 6.1* 6.4*   HEMATOCRIT % 20.1* 18.7* 19.0*   PLATELETS AUTO x10*3/uL 20* 33* 22*     Results from last 7 days   Lab Units 11/15/24  0710 11/15/24  0005   SODIUM mmol/L 139 138   POTASSIUM mmol/L 3.7 3.7   CHLORIDE mmol/L 103 101   CO2 mmol/L 31 27   BUN mg/dL 20 20   CREATININE mg/dL 1.21 1.45*   CALCIUM mg/dL 8.4* 8.6   PROTEIN TOTAL g/dL 7.1 7.1   BILIRUBIN TOTAL mg/dL 0.7 0.7   ALK PHOS U/L 64 71   ALT U/L 58* 62*   AST U/L 35 39   GLUCOSE mg/dL 103* 116*       XR wrist left 3+ views    Result Date: 11/15/2024  Interpreted By:  Lazaro Gayle, STUDY: XR WRIST LEFT 3+ VIEWS; XR HAND LEFT 3+ VIEWS;  11/15/2024 9:13 am   INDICATION: Signs/Symptoms:hand pain - recent trauma.   COMPARISON: None   ACCESSION NUMBER(S): [id number]; [id number]   ORDERING CLINICIAN: KALINA MARK   FINDINGS: No acute fracture or dislocation of the wrist. There is an acute mildly impacted fracture of the 5th metacarpal neck. Scattered MCP and IP osteophytes. No additional fractures of the left hand identified.       Mildly impacted left 5th metacarpal fracture.   MACRO: None   Signed by: Lazaro Gayle 11/15/2024 9:30 AM Dictation workstation:   [id number]    XR hand left 3+ views    Result Date: 11/15/2024  Interpreted By:  Lazaro Gayle, STUDY: XR WRIST LEFT 3+ VIEWS; XR HAND LEFT 3+ VIEWS;  11/15/2024 9:13 am   INDICATION: Signs/Symptoms:hand pain - recent trauma.   COMPARISON: None   ACCESSION NUMBER(S): [id number]; [id number]   ORDERING CLINICIAN: KALINA MARK   FINDINGS: No acute fracture or dislocation of the wrist. There is an acute mildly impacted fracture of the 5th metacarpal neck. Scattered MCP and IP osteophytes. No additional fractures of the left hand identified.       Mildly impacted left 5th metacarpal fracture.   MACRO: None   Signed by: Lazaro Gayle 11/15/2024 9:30 AM Dictation workstation:    [id number]    CT urography w 3D volume rendered imaging    Result Date: 11/8/2024  Interpreted By:  Alem Gibbs, STUDY: CT UROGRAPHY WITH 3D VOLUME RENDERED IMAGING;  11/7/2024 9:08 am   INDICATION: Signs/Symptoms:hematuria.   COMPARISON: PET-CT 10/07/2024   ACCESSION NUMBER(S): [id number]   ORDERING CLINICIAN: GABBIE CORNEJO   TECHNIQUE: CT of the abdomen and pelvis was performed.  TRIPLE PHASE TECHNIQUE: Contiguous axial images of the abdomen and pelvis were obtained at 3mm slice thickness before, 110sec and 10min after intravenous contrast administration. Oral ingestion of up to 900 ml or water was encouraged prior to the study. Multiplanar reformats in coronal and sagittal reconstructions at 3 mm slice thickness were performed. 3D reconstructions were performed on an independent workstation and provided for review. 100 ml of contrast Omnipaque 350 were administered intravenously without immediate complication.   FINDINGS: LOWER CHEST: Mild dependent atelectasis in the right lower lobe. Redemonstrated focal cystic change in the subpleural region of posterior left lower lobe. Lingular atelectasis/scarring. No pleural effusion.   ABDOMEN:   KIDNEYS AND URETERS: There is a nonobstructing calculus in the left lower pole measuring 9 mm. No right renal calculus or hydronephrosis in either kidney. There is a 4.3 cm cyst in the posterior left lower pole. A few subcentimeter left upper pole lesions too small to characterize likely benign. There is no suspicious filling defects in the bilateral collecting systems to suggest upper tract disease.   BLADDER: No wall thickening or calculus.   LIVER: No mass. Steatosis.   BILE DUCTS: No intrahepatic or extrahepatic bile duct dilation.   GALLBLADDER: No cholelithiasis or acute inflammation.   PANCREAS: No mass or duct dilation.   SPLEEN: No mass. No splenomegaly.   ADRENAL GLANDS: No mass.   PELVIS:   BOWEL: No bowel dilation or wall thickening. The appendix is normal.    VESSELS: Mild abdominal aortic atherosclerotic calcifications without aneurysmal dilation.   PERITONEUM/RETROPERITONEUM/LYMPH NODES: No free air, free fluid or fluid collection. No lymphadenopathy.   BONES AND ABDOMINAL WALL: Multilevel degenerative disc disease. Bilateral hip osteoarthritis.         9 mm nonobstructing left lower pole renal calculus. No right renal calculus or hydronephrosis in either kidney. No suspicious bladder mass or evidence of upper tract disease.   MACRO: None   Signed by: Alem Gibbs 11/8/2024 10:37 AM Dictation workstation:   [id number]    Complete Pulmonary Function Test Pre/Post Bronchodialator (Spirometry Pre/Post/DLCO/Lung Volumes)    Result Date: 11/4/2024  The FEV1/FVC (0.73) is normal. The FEV1 (1.73L/55% is moderately reduced. The FVC is moderately reduced. Following administration of bronchodilators, there is no significant response. The TLC by body plethysmography is reduced, suggesting a moderate restrictive defect. The DLCO (55%)s moderately reduced; however, the diffusing capacity was not corrected for the patient's hemoglobin. The airways resistance is normal.   Conclusion: Spirometry and lung volumes by plethysmography indicate a complex restrictive ventilatory impairment. DLCO values are consistent with loss of alveoli capillary structure with loss of lung volume ? e.g., emphysema or ILD. Patient has adequate preoperative lung reserves for lobectomy but somewhat borderline lung disease for a pneumonectomy.  Please correlate clinically.    CT cardiac scoring wo IV contrast    Addendum Date: 10/28/2024    Interpreted By:  Osvaldo Alcantara, ADDENDUM: NON-CARDIOVASCULAR FINDINGS   INCLUDED LUNGS, AIRWAYS AND PLEURA Endotracheal / endobronchial lesion: Negative Nodule: Negative Airspace disease: Negative Pleural effusion: Negative Pneumothorax: Negative Other: No acute or contributory unanticipated findings   INCLUDED NON-CARDIOVASCULAR CORINNE AND MEDIASTINUM Adenopathy:  Negative Included esophagus: Unremarkable   Other: Significant motion artifact limits this exam. Scattered bands of atelectasis or scarring. The large right upper lobe nodule noted on 3 October 2024 is in a portion of the lungs not included in the field of view on today's limited field of view CT chest   INCLUDED BONES: No acute skeletal findings, noting less sensitivity and specificity without dedicated sagittal and coronal reformatted series.   INCLUDED CHEST WALL No acute or contributory unanticipated findings   INCLUDED UPPER ABDOMEN No acute or contributory unanticipated findings   -------   NON-CARDIOVASCULAR IMPRESSION   NO ACUTE OR CONTRIBUTORY UNEXPECTED FINDINGS OF THE INCLUDED NON-CARDIOVASCULAR STRUCTURES   NOTE THIS ADDENDUM IS SOLELY FOR INTERPRETATION OF ANATOMY OUTSIDE THE CARDIOVASCULAR SYSTEM. INTERPRETATION OF AND REPORTING OF THE CARDIOVASCULAR STRUCTURES ARE THE SOLE RESPONSIBILITY OF THE CARDIOLOGIST SUBMITTING THE ORIGINAL REPORT (NOT THIS ADDENDUM)   Signed by: Osvaldo Alcantara 10/28/2024 9:29 AM   -------- ORIGINAL REPORT -------- Dictation workstation:   [id number]    Result Date: 10/28/2024  Interpreted By:  Carmelo Peña, STUDY: CT CARDIAC SCORING WO IV CONTRAST; 10/26/2024 8:44 am   INDICATION: Signs/Symptoms:cad.   COMPARISON: None.   ACCESSION NUMBER(S): [id number]   ORDERING CLINICIAN: NEEL FISCHER   TECHNIQUE: Using prospective ECG gating, CT scan of the coronary arteries was performed without intravenous contrast. Coronary calcium scoring  was performed according to the method of Agatston.   CT Dose-Length Product (DLP): 80 mGy*cm CT Dose Reduction Employed: Yes, prospective gating, iterative reconstruction.   FINDINGS: The score and distribution of calcium in the coronary arteries is as follows:   LM             0 LAD           0 LCx            0 RCA           0   Total         0   The visualized mid/lower ascending thoracic aorta measures 3.4 cm in diameter. The heart is  "normal in size. No pericardial effusion is present.       1. Coronary artery calcium score of  0*.   *Coronary artery calcium scoring may be helpful in predicting the risk for future coronary heart disease events.  According to the American College of Cardiology Foundation Clinical Expert Consensus Task Force, such testing provides important prognostic information in patients with more than one coronary heart disease risk factor. The coronary artery calcium score correlates with the annual risk of a non-fatal myocardial infarction or coronary heart disease death.   Coronary artery score            Annual Risk   0-99                               0.4% 100-399                          1.3% >400                              2.4%   These three \"breakpoints\" correspond to lower, intermediate and high risk states for future coronary events.  Such information should be used, along with appropriate clinical judgment, to make decisions regarding the intensity of risk factor management strategies to treat blood lipids and to modify other non-lipid coronary risk factors.   Reference: Daniel P et al. Circulation.  2007; 115:402-426   Reading Cardiologist: Dr. Carmelo Peña, Date: 10/28/2024 9:26 am   Signed by: Carmelo Peña 10/28/2024 9:26 AM Dictation workstation:   [id number]                    Assessment/Plan   Jaycee Harp is a 64 y.o. male on day 0 of admission presenting with Anemia due to bone marrow failure, unspecified bone marrow failure type (Multi).  Principal Problem:    Anemia due to bone marrow failure, unspecified bone marrow failure type (Multi)  Active Problems:    Onychomycosis    Hematuria    Primary hypertension    Acute on chronic anemia    Epistaxis  Pancytopenia- secondary to myeloproliferative disorder    Will transfuse 1 unit of packed RBC  Heme-onc consulted on recommendation on threshold for platelet and blood  Heme-onc is consulted  Await further input from them  Blood pressure being " borderline will hold off on blood pressure medication  No signs and symptoms of other active bleeding  Repeat labs for tomorrow as ordered  SCDs  Ambulation as tolerated  Will try to keep hemoglobin greater than 7 at this point  Podiatry is consulted for severe onychomycosis  Goals of care discussion took place with patient       Plan discussed with patient at bedside    Moderate level of MDM based on above issue and discussing plan    This note is created using voice recognition software. All efforts are made to minimize errors, if there are errors there due to transcription.    Garrett Blankenship  Hospitalist    Addendum 1:25 PM: Spoke with orthopedic surgeon Dr. Ricardo Frey, recommended placing the Ace wrap around the metacarpal provide ice and anti-inflammatories.  It is minimally displaced metacarpal shaft fracture and they will evaluate patient tomorrow.

## 2024-11-15 NOTE — CARE PLAN
The patient's goals for the shift include      The clinical goals for the shift include PT.WILL BE SEEN BY PCP/CONSULTS,RECIEVE PRESCRIBED MEDS/TX'S.

## 2024-11-16 VITALS
RESPIRATION RATE: 16 BRPM | WEIGHT: 296 LBS | TEMPERATURE: 98.1 F | HEART RATE: 83 BPM | DIASTOLIC BLOOD PRESSURE: 74 MMHG | SYSTOLIC BLOOD PRESSURE: 145 MMHG | BODY MASS INDEX: 42.37 KG/M2 | HEIGHT: 70 IN | OXYGEN SATURATION: 98 %

## 2024-11-16 PROBLEM — R04.0 EPISTAXIS: Status: RESOLVED | Noted: 2024-11-15 | Resolved: 2024-11-16

## 2024-11-16 PROBLEM — R31.9 HEMATURIA: Status: RESOLVED | Noted: 2023-10-12 | Resolved: 2024-11-16

## 2024-11-16 LAB
ANION GAP SERPL CALC-SCNC: 8 MMOL/L (ref 10–20)
BASO STIPL BLD QL SMEAR: PRESENT
BASOPHILS # BLD AUTO: 0 X10*3/UL (ref 0–0.1)
BASOPHILS NFR BLD AUTO: 0 %
BUN SERPL-MCNC: 19 MG/DL (ref 6–23)
CALCIUM SERPL-MCNC: 8.5 MG/DL (ref 8.6–10.3)
CHLORIDE SERPL-SCNC: 103 MMOL/L (ref 98–107)
CO2 SERPL-SCNC: 32 MMOL/L (ref 21–32)
CREAT SERPL-MCNC: 1.11 MG/DL (ref 0.5–1.3)
DACRYOCYTES BLD QL SMEAR: NORMAL
EGFRCR SERPLBLD CKD-EPI 2021: 74 ML/MIN/1.73M*2
EOSINOPHIL # BLD AUTO: 0.01 X10*3/UL (ref 0–0.7)
EOSINOPHIL NFR BLD AUTO: 0.6 %
ERYTHROCYTE [DISTWIDTH] IN BLOOD BY AUTOMATED COUNT: 18.6 % (ref 11.5–14.5)
GLUCOSE SERPL-MCNC: 116 MG/DL (ref 74–99)
HCT VFR BLD AUTO: 22.8 % (ref 41–52)
HGB BLD-MCNC: 7.4 G/DL (ref 13.5–17.5)
IMM GRANULOCYTES # BLD AUTO: 0.04 X10*3/UL (ref 0–0.7)
IMM GRANULOCYTES NFR BLD AUTO: 2.5 % (ref 0–0.9)
LYMPHOCYTES # BLD AUTO: 1.19 X10*3/UL (ref 1.2–4.8)
LYMPHOCYTES NFR BLD AUTO: 74.8 %
MAGNESIUM SERPL-MCNC: 1.86 MG/DL (ref 1.6–2.4)
MCH RBC QN AUTO: 33.2 PG (ref 26–34)
MCHC RBC AUTO-ENTMCNC: 32.5 G/DL (ref 32–36)
MCV RBC AUTO: 102 FL (ref 80–100)
MONOCYTES # BLD AUTO: 0.15 X10*3/UL (ref 0.1–1)
MONOCYTES NFR BLD AUTO: 9.4 %
NEUTROPHILS # BLD AUTO: 0.2 X10*3/UL (ref 1.2–7.7)
NEUTROPHILS NFR BLD AUTO: 12.7 %
NRBC BLD-RTO: 4.4 /100 WBCS (ref 0–0)
OVALOCYTES BLD QL SMEAR: NORMAL
PLATELET # BLD AUTO: 18 X10*3/UL (ref 150–450)
POLYCHROMASIA BLD QL SMEAR: NORMAL
POTASSIUM SERPL-SCNC: 4.5 MMOL/L (ref 3.5–5.3)
RBC # BLD AUTO: 2.23 X10*6/UL (ref 4.5–5.9)
RBC MORPH BLD: NORMAL
SCHISTOCYTES BLD QL SMEAR: NORMAL
SODIUM SERPL-SCNC: 138 MMOL/L (ref 136–145)
WBC # BLD AUTO: 1.6 X10*3/UL (ref 4.4–11.3)

## 2024-11-16 PROCEDURE — 85025 COMPLETE CBC W/AUTO DIFF WBC: CPT | Performed by: INTERNAL MEDICINE

## 2024-11-16 PROCEDURE — 2500000005 HC RX 250 GENERAL PHARMACY W/O HCPCS: Performed by: STUDENT IN AN ORGANIZED HEALTH CARE EDUCATION/TRAINING PROGRAM

## 2024-11-16 PROCEDURE — 99239 HOSP IP/OBS DSCHRG MGMT >30: CPT | Performed by: INTERNAL MEDICINE

## 2024-11-16 PROCEDURE — 80048 BASIC METABOLIC PNL TOTAL CA: CPT | Performed by: INTERNAL MEDICINE

## 2024-11-16 PROCEDURE — 36415 COLL VENOUS BLD VENIPUNCTURE: CPT | Performed by: INTERNAL MEDICINE

## 2024-11-16 PROCEDURE — 2500000001 HC RX 250 WO HCPCS SELF ADMINISTERED DRUGS (ALT 637 FOR MEDICARE OP): Performed by: INTERNAL MEDICINE

## 2024-11-16 PROCEDURE — 83735 ASSAY OF MAGNESIUM: CPT | Performed by: INTERNAL MEDICINE

## 2024-11-16 ASSESSMENT — PAIN SCALES - GENERAL
PAINLEVEL_OUTOF10: 0 - NO PAIN
PAINLEVEL_OUTOF10: 0 - NO PAIN

## 2024-11-16 ASSESSMENT — PAIN - FUNCTIONAL ASSESSMENT: PAIN_FUNCTIONAL_ASSESSMENT: 0-10

## 2024-11-16 NOTE — DISCHARGE INSTRUCTIONS
Elevate extremity to aid with swelling  Ice packs as needed to aid with swelling reduction  Do not lift items heavier than 5 pounds with the left upper extremity  Follow-up with Dr. Frey in clinic in 2 or 3 weeks for interval x-rays to make sure no displacement or angulation is occurred in the interim.  Call [phone] to schedule

## 2024-11-16 NOTE — DISCHARGE SUMMARY
Discharge Diagnosis  Anemia due to bone marrow failure, unspecified bone marrow failure type (Multi)    Issues Requiring Follow-Up  Outpatient hematology/oncology follow-up for myelofibrosis    Outpatient orthopedic follow-up for interval reassessment of left hand fracture    No outpatient podiatry follow-up for continued management of onychomycosis    Discharge Meds     Medication List      CHANGE how you take these medications     varenicline 0.5 mg tablet; Commonly known as: Chantix; What changed:   Another medication with the same name was removed. Continue taking this   medication, and follow the directions you see here.     CONTINUE taking these medications     albuterol 90 mcg/actuation inhaler; Commonly known as: ProAir HFA;   Inhale 2 puffs every 4 hours if needed for wheezing or shortness of   breath.   losartan-hydrochlorothiazide 50-12.5 mg tablet; Commonly known as:   Hyzaar; TAKE 1 TABLET BY MOUTH EVERY DAY   omeprazole 20 mg DR capsule; Commonly known as: PriLOSEC; Take 1 capsule   (20 mg) by mouth 2 times a day. Do not crush or chew.   phentermine 37.5 mg tablet; Commonly known as: Adipex-P; Take 1 tablet   (37.5 mg) by mouth once daily in the morning. Take before meals.   tadalafil 20 mg tablet; Commonly known as: Cialis; Take 1 tablet (20 mg)   by mouth once daily as needed for erectile dysfunction.       Test Results Pending At Discharge  Pending Labs       No current pending labs.            Hospital Course  Jaycee Harp is a 64 y.o. male presenting with abnormal blood work and states that he was notified by his outpatient care team that his hemoglobin was low and that he should report to the closest emergency department for further evaluation.  Patient does have known pancytopenia.  He has had a progressive downtrend in his hemoglobin as well as WBC count and platelets.  He recently underwent bone marrow biopsy with concern for broad differential including MDS, hairy cell leukemia, possibly AML.   "Bone marrow biopsy obtained 11/1/2024 notes hypercellular bone marrow with atypical megakaryocytes and increased fibrosis consistent with myeloproliferative neoplasm.  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  Final pathology remains pending.  These findings and their interpretation have not apparently been relayed to patient at this time.  Hemoglobin noted to be 6.1 at the time of admission.  Patient also endorsing intermittent episodes of epistaxis which have increased in frequency but tend to self resolve.  Also endorses dysuria with recent outpatient cystoscopy for gross hematuria.  Patient endorses persistence of symptoms.  Patient is also endorsing difficulty with ambulation.  Endorses profound dizziness and lightheadedness upon standing and states he feels unsteady on his feet.  Patient also endorses moderate left hand and wrist pain.  Although he did not disclose this to me, he did disclose to his bedside nurse that Saturday, 5 days prior to this current evaluation, he was given very upsetting family news and out of anger and frustration punched a wall.  Endorses difficulty making a closed fist.  Otherwise neurovascularly intact.  Admitted for further management.    Hemoglobin incremented appropriately following transfusion of 2 units PRBC.  Patient was evaluated by hematology/oncology service and his bone marrow biopsy results were discussed.  A diagnosis of primary myelofibrosis had been established with outpatient recommendations for follow-up including an upcoming appointment within 3 days of discharge.  Patient reported on the day of discharge feeling \"200% better\".  He states he has been ambulating out of bed to the bathroom independently and completely asymptomatic.  He understands follow-up recommendations for orthopedic surgery for interval monitoring of his broken left hand.  He understands recommendations for outpatient follow-up with podiatry service for ongoing management of his " fungal infection.  Medically cleared for discharge in 11/16/2024.  All questions addressed prior to discharge.     Pertinent Physical Exam At Time of Discharge  Physical Exam  Vitals reviewed.   Constitutional:       Appearance: Normal appearance.   HENT:      Head: Normocephalic and atraumatic.      Nose: Nose normal.      Mouth/Throat:      Mouth: Mucous membranes are moist.   Eyes:      Extraocular Movements: Extraocular movements intact.      Conjunctiva/sclera: Conjunctivae normal.      Pupils: Pupils are equal, round, and reactive to light.   Cardiovascular:      Rate and Rhythm: Normal rate and regular rhythm.      Pulses: Normal pulses.      Heart sounds: Normal heart sounds.   Pulmonary:      Effort: Pulmonary effort is normal.      Breath sounds: Normal breath sounds.   Abdominal:      General: Bowel sounds are normal.      Palpations: Abdomen is soft.   Musculoskeletal:         General: Normal range of motion.      Cervical back: Normal range of motion and neck supple.   Skin:     General: Skin is warm and dry.   Neurological:      General: No focal deficit present.      Mental Status: He is alert. Mental status is at baseline.   Psychiatric:         Mood and Affect: Mood normal.         Behavior: Behavior normal.         Outpatient Follow-Up  Future Appointments   Date Time Provider Department Center   11/19/2024  1:00 PM ERICA Allison-CNP [id number] [place]   12/20/2024  2:30 PM Mandeep Tucker DO [id number] [place]   2/27/2025  2:00 PM Jose Angulo MD [id number] [place]   4/16/2025  3:45 PM Davion Stokes OD [id number] [place]         Geoffrey Valadez DO

## 2024-11-16 NOTE — CARE PLAN
The patient's goals for the shift include sleep, comfort    The clinical goals for the shift include PT.WILL BE SEEN BY PCP/CONSULTS,RECIEVE PRESCRIBED MEDS/TX'S.

## 2024-11-16 NOTE — PROGRESS NOTES
Reason For Consult  LEFT 5th metacarpal shaft facture    History Of Present Illness  Jaycee Harp is a pleasant 64 y.o. male LHD with PMH secondary to MDS, HTN who presents with acute L hand pain after punching a car window. He had a death in his family prior to this and lost control and punched his car window. He had immediate pain and swelling along the ulnar border of the left hand.  There is no bleeding or exposed bone.  He denies any numbness or tingling in the left upper extremity.  He rates his pain as a 4 out of 10 or so.     Past Medical History  He has a past medical history of Contact with and (suspected) exposure to covid-19 (08/28/2020), Hematuria (10/12/2023), Personal history of other endocrine, nutritional and metabolic disease (01/13/2021), and Personal history of other specified conditions (08/28/2020).    Surgical History  He has a past surgical history that includes Other surgical history (10/31/2019).     Social History  He reports that he quit smoking about 53 years ago. His smoking use included cigarettes. He started smoking about 7 weeks ago. He has been exposed to tobacco smoke. He has quit using smokeless tobacco. He reports that he does not currently use alcohol after a past usage of about 24.0 standard drinks of alcohol per week. He reports current drug use. Drug: Marijuana.    Family History  Family History   Problem Relation Name Age of Onset    Diabetes Mother      Other (varicose veins) Mother      Liver cancer Mother      Diabetes Father      Tuberculosis Father          Allergies  Patient has no known allergies.    Review of Systems  Negative besides HPI above     Physical Exam  Alert and oriented x 3  Appears stated age  Normal respiratory effort    Left hand  Tenderness palpation along the fifth metacarpal shaft at the distal aspect  Mild to moderate amount of swelling  No abrasions  No obvious evidence of open fracture  Able to flex and extend the MCP PIP and DIP of the fifth  "ray  Good cap refill the digit  Sensation intact to light touch at the medial and ulnar border of the fifth digit     Last Recorded Vitals  Blood pressure 141/67, pulse 74, temperature 36.5 °C (97.7 °F), temperature source Temporal, resp. rate 18, height 1.778 m (5' 10\"), weight 134 kg (296 lb), SpO2 97%.    Relevant Results  Imaging  Left hand  Fracture of the fifth metacarpal neck, 25 degrees of volar angulation, no dislocations at the CMC or MCP joints of the affected ray, no foreign bodies    Assessment/Plan   This is a very pleasant 64-year-old gentleman who presents with a left fifth metacarpal neck fracture after punching a wall date of injury was approximately 2 days ago.  The injury is closed and minimally displaced on x-ray.    Please provide patient with a Ace wrap for soft tissue rest, can wrap the Ace wrap around the metacarpals   Elevate extremity to aid with swelling  Ice packs as needed to aid with swelling reduction  Pain medications per primary team  Do not lift items heavier than 5 pounds with the left upper extremity  Follow-up with me in clinic in 2 or 3 weeks for interval x-rays to make sure no displacement or angulation is occurred in the interim.  Call [phone] to schedule    Robert Frey MD   "

## 2024-11-17 NOTE — NURSING NOTE
PT.DISCHARGED HOME ---[facility] FOR TRANSPORT.SALINE LOCK REMOVED, PT.VERBALIZES UNDERSTANDING OF DISCHARGE INSTRUCTIONS INCLUDING FOLLOW UP'S WITH PCP/ONCOLOGY/  ORTHOPEDICS. PRIOR TO DISCHARGE, PT.AMBULATED IN [place]---+STEADINESS/NO ACUTE DYSFUNCTION NOTED--ROOM AIR PULSE OX WALKING RANGED FROM 89%-93%---NO ACUTE RESP DISTRESS EXPRESSED. PT.STATES HE ALREADY HAS HOME O2 AT HOME IF HE NEEDS IT. NO ACUTE DISTRESS UPON DISCHARGE.

## 2024-11-18 ENCOUNTER — PATIENT OUTREACH (OUTPATIENT)
Dept: PRIMARY CARE | Facility: CLINIC | Age: 64
End: 2024-11-18
Payer: MEDICARE

## 2024-11-18 NOTE — PROGRESS NOTES
Discharge Facility: [facility] [place]   Discharge Diagnosis: Anemia due to bone marrow failure   Admission Date: 11/14/24  Discharge Date: 11/16/24    PCP Appointment Date:  Mandeep Tucker DO tasked to office                                            Specialist Appointment Date:  Pulmonology Dr García 11/19/24  Hospital Encounter and Summary Linked: Yes  See discharge assessment below for further details     Engagement  Call Start Time: 1308 (11/18/2024  1:50 PM)    Medications  Medications reviewed with patient/caregiver?: Not applicable (11/18/2024  1:50 PM)  Is the patient having any side effects they believe may be caused by any medication additions or changes?: No (11/18/2024  1:50 PM)  Does the patient have all medications ordered at discharge?: Yes (11/18/2024  1:50 PM)  Care Management Interventions: No intervention needed (11/18/2024  1:50 PM)  Prescription Comments: No new prescription medications (11/18/2024  1:50 PM)  Is the patient taking all medications as directed (includes completed medication regime)?: Yes (11/18/2024  1:50 PM)  Medication Comments: no issues obtaining medications (11/18/2024  1:50 PM)    Appointments  Does the patient have a primary care provider?: Yes (11/18/2024  1:50 PM)  Care Management Interventions: Educated patient on importance of making appointment; Advised patient to make appointment (11/18/2024  1:50 PM)  Has the patient kept scheduled appointments due by today?: Yes (11/18/2024  1:50 PM)    Self Management  What is the home health agency?: denies need (11/18/2024  1:50 PM)  What Durable Medical Equipment (DME) was ordered?: denies need (11/18/2024  1:50 PM)    Patient Teaching  Does the patient have access to their discharge instructions?: Yes (11/18/2024  1:50 PM)  Care Management Interventions: Reviewed instructions with patient; Educated on MyChart (11/18/2024  1:50 PM)  What is the patient's perception of their health status since discharge?:  Improving (11/18/2024  1:50 PM)  Is the patient/caregiver able to teach back the hierarchy of who to call/visit for symptoms/problems? PCP, Specialist, Home Health nurse, Urgent Care, ED, 911: Yes (11/18/2024  1:50 PM)  Patient/Caregiver Education Comments: CM discussed referencing discharge paperwork to follow detailed daily medication schedule (11/18/2024  1:50 PM)    Wrap Up  Wrap Up Additional Comments: This CM spoke with patient via phone. Successful transition of care outreach complete. Pt reports doing well at home since discharge. Pt denies any prescription medication questions. Pt denies CP and SOB. Patient denies any further discharge questions/needs at this time. Emphasized that Follow up is needed after discharge to review the hospital recommendations, assess your response to your treatment. Pt reports he is waiting to hear from Dr Beebe to schedule a follow up appointment.  Pt aware of my availability for non-emergent concerns. Contact info provided to patient. (11/18/2024  1:50 PM)

## 2024-11-19 ENCOUNTER — APPOINTMENT (OUTPATIENT)
Dept: PULMONOLOGY | Facility: CLINIC | Age: 64
End: 2024-11-19
Payer: MEDICARE

## 2024-11-19 VITALS
HEART RATE: 91 BPM | HEIGHT: 70 IN | DIASTOLIC BLOOD PRESSURE: 68 MMHG | OXYGEN SATURATION: 92 % | RESPIRATION RATE: 18 BRPM | WEIGHT: 297.8 LBS | SYSTOLIC BLOOD PRESSURE: 126 MMHG | BODY MASS INDEX: 42.63 KG/M2

## 2024-11-19 DIAGNOSIS — D61.818 PANCYTOPENIA: ICD-10-CM

## 2024-11-19 DIAGNOSIS — D50.9 IRON DEFICIENCY ANEMIA, UNSPECIFIED IRON DEFICIENCY ANEMIA TYPE: ICD-10-CM

## 2024-11-19 DIAGNOSIS — D75.81 MYELOFIBROSIS (MULTI): ICD-10-CM

## 2024-11-19 DIAGNOSIS — C34.11 MALIGNANT NEOPLASM OF UPPER LOBE OF RIGHT LUNG (MULTI): Primary | ICD-10-CM

## 2024-11-19 DIAGNOSIS — D61.9 ANEMIA DUE TO BONE MARROW FAILURE, UNSPECIFIED BONE MARROW FAILURE TYPE (MULTI): ICD-10-CM

## 2024-11-19 PROCEDURE — 1036F TOBACCO NON-USER: CPT | Performed by: NURSE PRACTITIONER

## 2024-11-19 PROCEDURE — 3074F SYST BP LT 130 MM HG: CPT | Performed by: NURSE PRACTITIONER

## 2024-11-19 PROCEDURE — 99214 OFFICE O/P EST MOD 30 MIN: CPT | Performed by: NURSE PRACTITIONER

## 2024-11-19 PROCEDURE — 3078F DIAST BP <80 MM HG: CPT | Performed by: NURSE PRACTITIONER

## 2024-11-19 PROCEDURE — 1123F ACP DISCUSS/DSCN MKR DOCD: CPT | Performed by: NURSE PRACTITIONER

## 2024-11-19 PROCEDURE — 3008F BODY MASS INDEX DOCD: CPT | Performed by: NURSE PRACTITIONER

## 2024-11-19 PROCEDURE — 1111F DSCHRG MED/CURRENT MED MERGE: CPT | Performed by: NURSE PRACTITIONER

## 2024-11-19 PROCEDURE — 1159F MED LIST DOCD IN RCRD: CPT | Performed by: NURSE PRACTITIONER

## 2024-11-19 ASSESSMENT — ENCOUNTER SYMPTOMS
DEPRESSION: 0
OCCASIONAL FEELINGS OF UNSTEADINESS: 0
LOSS OF SENSATION IN FEET: 0

## 2024-11-19 ASSESSMENT — COPD QUESTIONNAIRES
QUESTION7_SLEEPQUALITY: 0 - I SLEEP SOUNDLY
QUESTION3_CHESTTIGHTNESS: 0 - MY CHEST DOES NOT FEEL TIGHT AT ALL
QUESTION8_ENERGYLEVEL: 1
QUESTION4_WALKINCLINE: 5 - WHEN I WALK UP A HILL OR ONE FLIGHT OF STAIRS I AM VERY BREATHLESS
CAT_TOTALSCORE: 15
QUESTION1_COUGHFREQUENCY: 0 - I NEVER COUGH
QUESTION6_LEAVINGHOUSE: 1
QUESTION2_CHESTPHLEGM: 3
QUESTION5_HOMEACTIVITIES: 5 - I AM VERY LIMITED DOING ACTIVITIES AT HOME

## 2024-11-19 ASSESSMENT — COLUMBIA-SUICIDE SEVERITY RATING SCALE - C-SSRS
2. HAVE YOU ACTUALLY HAD ANY THOUGHTS OF KILLING YOURSELF?: NO
1. IN THE PAST MONTH, HAVE YOU WISHED YOU WERE DEAD OR WISHED YOU COULD GO TO SLEEP AND NOT WAKE UP?: NO
6. HAVE YOU EVER DONE ANYTHING, STARTED TO DO ANYTHING, OR PREPARED TO DO ANYTHING TO END YOUR LIFE?: NO

## 2024-11-19 ASSESSMENT — PATIENT HEALTH QUESTIONNAIRE - PHQ9
SUM OF ALL RESPONSES TO PHQ9 QUESTIONS 1 AND 2: 0
2. FEELING DOWN, DEPRESSED OR HOPELESS: NOT AT ALL
1. LITTLE INTEREST OR PLEASURE IN DOING THINGS: NOT AT ALL

## 2024-11-20 ENCOUNTER — EDUCATION (OUTPATIENT)
Dept: HEMATOLOGY/ONCOLOGY | Facility: CLINIC | Age: 64
End: 2024-11-20
Payer: MEDICARE

## 2024-11-20 ENCOUNTER — LAB (OUTPATIENT)
Dept: LAB | Facility: CLINIC | Age: 64
End: 2024-11-20
Payer: MEDICARE

## 2024-11-20 ENCOUNTER — SPECIALTY PHARMACY (OUTPATIENT)
Dept: HEMATOLOGY/ONCOLOGY | Facility: CLINIC | Age: 64
End: 2024-11-20

## 2024-11-20 ENCOUNTER — OFFICE VISIT (OUTPATIENT)
Dept: HEMATOLOGY/ONCOLOGY | Facility: CLINIC | Age: 64
End: 2024-11-20
Payer: MEDICARE

## 2024-11-20 VITALS
OXYGEN SATURATION: 92 % | DIASTOLIC BLOOD PRESSURE: 74 MMHG | TEMPERATURE: 97.3 F | HEART RATE: 107 BPM | SYSTOLIC BLOOD PRESSURE: 140 MMHG | WEIGHT: 292.99 LBS | RESPIRATION RATE: 18 BRPM | BODY MASS INDEX: 42.04 KG/M2

## 2024-11-20 DIAGNOSIS — E66.01 MORBID OBESITY (MULTI): ICD-10-CM

## 2024-11-20 DIAGNOSIS — D75.81 MYELOFIBROSIS (MULTI): Primary | ICD-10-CM

## 2024-11-20 DIAGNOSIS — D61.818 PANCYTOPENIA: ICD-10-CM

## 2024-11-20 DIAGNOSIS — I10 PRIMARY HYPERTENSION: ICD-10-CM

## 2024-11-20 DIAGNOSIS — D50.9 IRON DEFICIENCY ANEMIA, UNSPECIFIED IRON DEFICIENCY ANEMIA TYPE: ICD-10-CM

## 2024-11-20 DIAGNOSIS — R91.1 LUNG NODULE: ICD-10-CM

## 2024-11-20 DIAGNOSIS — J43.2 CENTRILOBULAR EMPHYSEMA (MULTI): ICD-10-CM

## 2024-11-20 DIAGNOSIS — F17.211 CIGARETTE NICOTINE DEPENDENCE IN REMISSION: ICD-10-CM

## 2024-11-20 LAB
ABO GROUP (TYPE) IN BLOOD: NORMAL
ABO GROUP (TYPE) IN BLOOD: NORMAL
ALBUMIN SERPL BCP-MCNC: 4.1 G/DL (ref 3.4–5)
ALP SERPL-CCNC: 86 U/L (ref 33–136)
ALT SERPL W P-5'-P-CCNC: 56 U/L (ref 10–52)
ANION GAP SERPL CALC-SCNC: 9 MMOL/L (ref 10–20)
ANTIBODY SCREEN: NORMAL
AST SERPL W P-5'-P-CCNC: 31 U/L (ref 9–39)
BASOPHILS # BLD AUTO: 0 X10*3/UL (ref 0–0.1)
BASOPHILS NFR BLD AUTO: 0 %
BILIRUB SERPL-MCNC: 0.7 MG/DL (ref 0–1.2)
BUN SERPL-MCNC: 15 MG/DL (ref 6–23)
CALCIUM SERPL-MCNC: 9.2 MG/DL (ref 8.6–10.3)
CHLORIDE SERPL-SCNC: 104 MMOL/L (ref 98–107)
CO2 SERPL-SCNC: 31 MMOL/L (ref 21–32)
CREAT SERPL-MCNC: 0.97 MG/DL (ref 0.5–1.3)
EGFRCR SERPLBLD CKD-EPI 2021: 87 ML/MIN/1.73M*2
EOSINOPHIL # BLD AUTO: 0 X10*3/UL (ref 0–0.7)
EOSINOPHIL NFR BLD AUTO: 0 %
ERYTHROCYTE [DISTWIDTH] IN BLOOD BY AUTOMATED COUNT: 16.5 % (ref 11.5–14.5)
FERRITIN SERPL-MCNC: 1150 NG/ML (ref 20–300)
GLUCOSE SERPL-MCNC: 113 MG/DL (ref 74–99)
HBV CORE AB SER QL: NONREACTIVE
HBV SURFACE AB SER-ACNC: <3.1 MIU/ML
HBV SURFACE AG SERPL QL IA: NONREACTIVE
HCT VFR BLD AUTO: 23.5 % (ref 41–52)
HGB BLD-MCNC: 8.1 G/DL (ref 13.5–17.5)
IMM GRANULOCYTES # BLD AUTO: 0 X10*3/UL (ref 0–0.7)
IMM GRANULOCYTES NFR BLD AUTO: 0 % (ref 0–0.9)
IRON SATN MFR SERPL: ABNORMAL %
IRON SERPL-MCNC: 344 UG/DL (ref 35–150)
LYMPHOCYTES # BLD AUTO: 1.36 X10*3/UL (ref 1.2–4.8)
LYMPHOCYTES NFR BLD AUTO: 77.7 %
MCH RBC QN AUTO: 34.2 PG (ref 26–34)
MCHC RBC AUTO-ENTMCNC: 34.5 G/DL (ref 32–36)
MCV RBC AUTO: 99 FL (ref 80–100)
MONOCYTES # BLD AUTO: 0.16 X10*3/UL (ref 0.1–1)
MONOCYTES NFR BLD AUTO: 9.1 %
NEUTROPHILS # BLD AUTO: 0.23 X10*3/UL (ref 1.2–7.7)
NEUTROPHILS NFR BLD AUTO: 13.2 %
NRBC BLD-RTO: ABNORMAL /100{WBCS}
PLATELET # BLD AUTO: 14 X10*3/UL (ref 150–450)
POTASSIUM SERPL-SCNC: 4.1 MMOL/L (ref 3.5–5.3)
PROT SERPL-MCNC: 7.6 G/DL (ref 6.4–8.2)
RBC # BLD AUTO: 2.37 X10*6/UL (ref 4.5–5.9)
RH FACTOR (ANTIGEN D): NORMAL
RH FACTOR (ANTIGEN D): NORMAL
SODIUM SERPL-SCNC: 140 MMOL/L (ref 136–145)
TIBC SERPL-MCNC: ABNORMAL UG/DL
UIBC SERPL-MCNC: <55 UG/DL (ref 110–370)
WBC # BLD AUTO: 1.8 X10*3/UL (ref 4.4–11.3)

## 2024-11-20 PROCEDURE — 82728 ASSAY OF FERRITIN: CPT

## 2024-11-20 PROCEDURE — 1111F DSCHRG MED/CURRENT MED MERGE: CPT | Performed by: INTERNAL MEDICINE

## 2024-11-20 PROCEDURE — 3077F SYST BP >= 140 MM HG: CPT | Performed by: INTERNAL MEDICINE

## 2024-11-20 PROCEDURE — 80053 COMPREHEN METABOLIC PANEL: CPT

## 2024-11-20 PROCEDURE — 36415 COLL VENOUS BLD VENIPUNCTURE: CPT

## 2024-11-20 PROCEDURE — 99214 OFFICE O/P EST MOD 30 MIN: CPT | Performed by: INTERNAL MEDICINE

## 2024-11-20 PROCEDURE — 1126F AMNT PAIN NOTED NONE PRSNT: CPT | Performed by: INTERNAL MEDICINE

## 2024-11-20 PROCEDURE — 86704 HEP B CORE ANTIBODY TOTAL: CPT

## 2024-11-20 PROCEDURE — 3078F DIAST BP <80 MM HG: CPT | Performed by: INTERNAL MEDICINE

## 2024-11-20 PROCEDURE — 86901 BLOOD TYPING SEROLOGIC RH(D): CPT

## 2024-11-20 PROCEDURE — G2211 COMPLEX E/M VISIT ADD ON: HCPCS | Performed by: INTERNAL MEDICINE

## 2024-11-20 PROCEDURE — 1159F MED LIST DOCD IN RCRD: CPT | Performed by: INTERNAL MEDICINE

## 2024-11-20 PROCEDURE — 1123F ACP DISCUSS/DSCN MKR DOCD: CPT | Performed by: INTERNAL MEDICINE

## 2024-11-20 PROCEDURE — 83540 ASSAY OF IRON: CPT

## 2024-11-20 PROCEDURE — 87340 HEPATITIS B SURFACE AG IA: CPT

## 2024-11-20 PROCEDURE — 85025 COMPLETE CBC W/AUTO DIFF WBC: CPT

## 2024-11-20 PROCEDURE — 86706 HEP B SURFACE ANTIBODY: CPT

## 2024-11-20 RX ORDER — ALBUTEROL SULFATE 0.83 MG/ML
3 SOLUTION RESPIRATORY (INHALATION) AS NEEDED
OUTPATIENT
Start: 2024-11-20

## 2024-11-20 RX ORDER — EPINEPHRINE 0.3 MG/.3ML
0.3 INJECTION SUBCUTANEOUS EVERY 5 MIN PRN
OUTPATIENT
Start: 2024-11-20

## 2024-11-20 RX ORDER — DIPHENHYDRAMINE HYDROCHLORIDE 50 MG/ML
50 INJECTION INTRAMUSCULAR; INTRAVENOUS AS NEEDED
OUTPATIENT
Start: 2024-11-20

## 2024-11-20 RX ORDER — FAMOTIDINE 10 MG/ML
20 INJECTION INTRAVENOUS ONCE AS NEEDED
OUTPATIENT
Start: 2024-11-20

## 2024-11-20 ASSESSMENT — PAIN SCALES - GENERAL: PAINLEVEL_OUTOF10: 0-NO PAIN

## 2024-11-20 NOTE — PATIENT INSTRUCTIONS
You will start momelotinib    I will refer you to Dr Massey    We will be checking your blood counts weekly, and offering transfusion support as needed

## 2024-11-20 NOTE — PROGRESS NOTES
Patient ID: Jaycee Harp is a 64 y.o. male.  Referring Physician: No referring provider defined for this encounter.  Primary Care Provider: Mandeep Tucker, DO  Visit Type: Follow Up      Subjective    HPI What do you recommend?  I'm not dependent on oxygen    Review of Systems   Constitutional:  Positive for fatigue.   HENT:  Negative.     Eyes: Negative.    Respiratory:  Positive for shortness of breath.    Cardiovascular: Negative.    Gastrointestinal: Negative.    Endocrine: Negative.    Genitourinary: Negative.     Musculoskeletal: Negative.    Skin: Negative.    Neurological: Negative.    Hematological: Negative.    Psychiatric/Behavioral:  The patient is nervous/anxious.         Objective   BSA: 2.56 meters squared  /74 (BP Location: Right arm)   Pulse 107   Temp 36.3 °C (97.3 °F) (Temporal)   Resp 18   Wt 133 kg (292 lb 15.9 oz)   SpO2 92% Comment: 3L o O2  BMI 42.04 kg/m²      has a past medical history of Contact with and (suspected) exposure to covid-19 (08/28/2020), Hematuria (10/12/2023), Personal history of other endocrine, nutritional and metabolic disease (01/13/2021), and Personal history of other specified conditions (08/28/2020).   has a past surgical history that includes Other surgical history (10/31/2019).  Family History   Problem Relation Name Age of Onset    Diabetes Mother      Other (varicose veins) Mother      Liver cancer Mother      Diabetes Father      Tuberculosis Father       Oncology History    No history exists.       Jaycee Harp  reports that he quit smoking about 53 years ago. His smoking use included cigarettes. He started smoking about 8 weeks ago. He has been exposed to tobacco smoke. He has quit using smokeless tobacco.  He  reports that he does not currently use alcohol after a past usage of about 24.0 standard drinks of alcohol per week.  He  reports current drug use. Drug: Marijuana.    Physical Exam  Vitals reviewed.   Constitutional:       Appearance: Normal  appearance.   HENT:      Head: Normocephalic.      Mouth/Throat:      Mouth: Mucous membranes are moist.   Eyes:      Extraocular Movements: Extraocular movements intact.      Pupils: Pupils are equal, round, and reactive to light.   Cardiovascular:      Rate and Rhythm: Normal rate and regular rhythm.      Pulses: Normal pulses.      Heart sounds: Normal heart sounds.   Pulmonary:      Effort: Pulmonary effort is normal.      Breath sounds: Rhonchi and rales present.   Abdominal:      General: Bowel sounds are normal.      Palpations: Abdomen is soft.   Musculoskeletal:         General: Normal range of motion.      Cervical back: Normal range of motion and neck supple.   Skin:     General: Skin is warm.   Neurological:      General: No focal deficit present.      Mental Status: He is alert and oriented to person, place, and time. Mental status is at baseline.   Psychiatric:         Mood and Affect: Mood normal.         Behavior: Behavior normal.         WBC   Date/Time Value Ref Range Status   11/16/2024 06:02 AM 1.6 (L) 4.4 - 11.3 x10*3/uL Final   11/15/2024 07:10 AM 1.5 (L) 4.4 - 11.3 x10*3/uL Final   11/15/2024 12:05 AM 1.8 (L) 4.4 - 11.3 x10*3/uL Final     nRBC   Date Value Ref Range Status   11/16/2024 4.4 (H) 0.0 - 0.0 /100 WBCs Final   11/15/2024 4.1 (H) 0.0 - 0.0 /100 WBCs Final   11/15/2024 2.9 (H) 0.0 - 0.0 /100 WBCs Final     RBC   Date Value Ref Range Status   11/16/2024 2.23 (L) 4.50 - 5.90 x10*6/uL Final   11/15/2024 1.94 (L) 4.50 - 5.90 x10*6/uL Final   11/15/2024 1.69 (L) 4.50 - 5.90 x10*6/uL Final     Hemoglobin   Date Value Ref Range Status   11/16/2024 7.4 (L) 13.5 - 17.5 g/dL Final   11/15/2024 6.7 (L) 13.5 - 17.5 g/dL Final   11/15/2024 6.1 (LL) 13.5 - 17.5 g/dL Final     Hematocrit   Date Value Ref Range Status   11/16/2024 22.8 (L) 41.0 - 52.0 % Final   11/15/2024 20.1 (L) 41.0 - 52.0 % Final   11/15/2024 18.7 (L) 41.0 - 52.0 % Final     MCV   Date/Time Value Ref Range Status   11/16/2024  "06:02  (H) 80 - 100 fL Final   11/15/2024 07:10  (H) 80 - 100 fL Final   11/15/2024 12:05  (H) 80 - 100 fL Final     MCH   Date/Time Value Ref Range Status   11/16/2024 06:02 AM 33.2 26.0 - 34.0 pg Final   11/15/2024 07:10 AM 34.5 (H) 26.0 - 34.0 pg Final   11/15/2024 12:05 AM 36.1 (H) 26.0 - 34.0 pg Final     MCHC   Date/Time Value Ref Range Status   11/16/2024 06:02 AM 32.5 32.0 - 36.0 g/dL Final   11/15/2024 07:10 AM 33.3 32.0 - 36.0 g/dL Final   11/15/2024 12:05 AM 32.6 32.0 - 36.0 g/dL Final     RDW   Date/Time Value Ref Range Status   11/16/2024 06:02 AM 18.6 (H) 11.5 - 14.5 % Final   11/15/2024 07:10 AM 15.8 (H) 11.5 - 14.5 % Final   11/15/2024 12:05 AM 15.0 (H) 11.5 - 14.5 % Final     Platelets   Date/Time Value Ref Range Status   11/16/2024 06:02 AM 18 (LL) 150 - 450 x10*3/uL Final     Comment:     Platelet count verified by smear review.   11/15/2024 07:10 AM 20 (LL) 150 - 450 x10*3/uL Final     Comment:     Previous result verified on 11/15/2024 0046 on specimen/case [id number] called with component PLT for procedure CBC and Auto Differential with value 33 x10*3/uL.   11/15/2024 12:05 AM 33 (LL) 150 - 450 x10*3/uL Final     Comment:     Platelet count verified by smear review.  Previous result verified on 11/14/2024 1816 on specimen/case [id number] called with component PLT for procedure CBC and Auto Differential with value 22 x10*3/uL.     No results found for: \"MPV\"  Neutrophils %   Date/Time Value Ref Range Status   11/16/2024 06:02 AM 12.7 40.0 - 80.0 % Final   11/15/2024 12:05 AM 14.8 40.0 - 80.0 % Final   11/01/2024 09:15 AM 17.4 40.0 - 80.0 % Final     Immature Granulocytes %, Automated   Date/Time Value Ref Range Status   11/16/2024 06:02 AM 2.5 (H) 0.0 - 0.9 % Final     Comment:     Immature Granulocyte Count (IG) includes promyelocytes, myelocytes and metamyelocytes but does not include bands. Percent differential counts (%) should be interpreted in the context of the " absolute cell counts (cells/UL).   11/15/2024 12:05 AM 0.0 0.0 - 0.9 % Final     Comment:     Immature Granulocyte Count (IG) includes promyelocytes, myelocytes and metamyelocytes but does not include bands. Percent differential counts (%) should be interpreted in the context of the absolute cell counts (cells/UL).   11/14/2024 02:58 PM 0.5 0.0 - 0.9 % Final     Comment:     Immature Granulocyte Count (IG) includes promyelocytes, myelocytes and metamyelocytes but does not include bands. Percent differential counts (%) should be interpreted in the context of the absolute cell counts (cells/UL).     Lymphocytes %, Manual   Date/Time Value Ref Range Status   11/14/2024 02:58 PM 76.0 13.0 - 44.0 % Final   10/11/2024 09:59 AM 70.0 13.0 - 44.0 % Final     Lymphocytes %   Date/Time Value Ref Range Status   11/16/2024 06:02 AM 74.8 13.0 - 44.0 % Final   11/15/2024 12:05 AM 77.7 13.0 - 44.0 % Final   11/01/2024 09:15 AM 74.5 13.0 - 44.0 % Final     Monocytes %, Manual   Date/Time Value Ref Range Status   11/14/2024 02:58 PM 5.0 2.0 - 10.0 % Final   10/11/2024 09:59 AM 3.0 2.0 - 10.0 % Final     Monocytes %   Date/Time Value Ref Range Status   11/16/2024 06:02 AM 9.4 2.0 - 10.0 % Final   11/15/2024 12:05 AM 6.9 2.0 - 10.0 % Final   11/01/2024 09:15 AM 7.5 2.0 - 10.0 % Final     Eosinophils %, Manual   Date/Time Value Ref Range Status   11/14/2024 02:58 PM 2.0 0.0 - 6.0 % Final   10/11/2024 09:59 AM 2.0 0.0 - 6.0 % Final     Eosinophils %   Date/Time Value Ref Range Status   11/16/2024 06:02 AM 0.6 0.0 - 6.0 % Final   11/15/2024 12:05 AM 0.6 0.0 - 6.0 % Final   11/01/2024 09:15 AM 0.6 0.0 - 6.0 % Final     Basophils %, Manual   Date/Time Value Ref Range Status   11/14/2024 02:58 PM 0.0 0.0 - 2.0 % Final   10/11/2024 09:59 AM 0.0 0.0 - 2.0 % Final     Basophils %   Date/Time Value Ref Range Status   11/16/2024 06:02 AM 0.0 0.0 - 2.0 % Final   11/15/2024 12:05 AM 0.0 0.0 - 2.0 % Final   11/01/2024 09:15 AM 0.0 0.0 - 2.0 % Final      Neutrophils Absolute   Date/Time Value Ref Range Status   11/16/2024 06:02 AM 0.20 (L) 1.20 - 7.70 x10*3/uL Final     Comment:     Percent differential counts (%) should be interpreted in the context of the absolute cell counts (cells/uL).   11/15/2024 12:05 AM 0.26 (L) 1.20 - 7.70 x10*3/uL Final     Comment:     Percent differential counts (%) should be interpreted in the context of the absolute cell counts (cells/uL).   11/01/2024 09:15 AM 0.28 (L) 1.20 - 7.70 x10*3/uL Final     Comment:     Percent differential counts (%) should be interpreted in the context of the absolute cell counts (cells/uL).     Immature Granulocytes Absolute, Automated   Date/Time Value Ref Range Status   11/16/2024 06:02 AM 0.04 0.00 - 0.70 x10*3/uL Final   11/15/2024 12:05 AM 0.00 0.00 - 0.70 x10*3/uL Final   11/14/2024 02:58 PM 0.01 0.00 - 0.70 x10*3/uL Final     Lymphocytes Absolute   Date/Time Value Ref Range Status   11/16/2024 06:02 AM 1.19 (L) 1.20 - 4.80 x10*3/uL Final   11/15/2024 12:05 AM 1.36 1.20 - 4.80 x10*3/uL Final   11/01/2024 09:15 AM 1.20 1.20 - 4.80 x10*3/uL Final     Monocytes Absolute   Date/Time Value Ref Range Status   11/16/2024 06:02 AM 0.15 0.10 - 1.00 x10*3/uL Final   11/15/2024 12:05 AM 0.12 0.10 - 1.00 x10*3/uL Final   11/01/2024 09:15 AM 0.12 0.10 - 1.00 x10*3/uL Final     Eosinophils Absolute   Date/Time Value Ref Range Status   11/16/2024 06:02 AM 0.01 0.00 - 0.70 x10*3/uL Final   11/15/2024 12:05 AM 0.01 0.00 - 0.70 x10*3/uL Final   11/01/2024 09:15 AM 0.01 0.00 - 0.70 x10*3/uL Final     Eosinophils Absolute, Manual   Date/Time Value Ref Range Status   11/14/2024 02:58 PM 0.04 0.00 - 0.70 x10*3/uL Final   10/11/2024 09:59 AM 0.04 0.00 - 0.70 x10*3/uL Final     Basophils Absolute   Date/Time Value Ref Range Status   11/16/2024 06:02 AM 0.00 0.00 - 0.10 x10*3/uL Final     Comment:     Automated WBC differential has been confirmed by manual smear.   11/15/2024 12:05 AM 0.00 0.00 - 0.10 x10*3/uL Final  "  11/01/2024 09:15 AM 0.00 0.00 - 0.10 x10*3/uL Final     Comment:     Automated WBC differential has been confirmed by manual smear.     Basophils Absolute, Manual   Date/Time Value Ref Range Status   11/14/2024 02:58 PM 0.00 0.00 - 0.10 x10*3/uL Final   10/11/2024 09:59 AM 0.00 0.00 - 0.10 x10*3/uL Final       No components found for: \"PT\"  aPTT   Date/Time Value Ref Range Status   11/15/2024 12:05 AM 25 (L) 27 - 38 seconds Final   09/15/2021 04:30 PM 35 25 - 35 sec Final     Comment:       THE APTT IS NO LONGER USED FOR MONITORING     UNFRACTIONATED HEPARIN THERAPY.    FOR MONITORING HEPARIN THERAPY,     USE THE HEPARIN ASSAY.       Medication Documentation Review Audit       Reviewed by ROSALINDA Escalona (Patient Care Technician) on 11/25/24 at 0821      Medication Order Taking? Sig Documenting Provider Last Dose Status   albuterol (ProAir HFA) 90 mcg/actuation inhaler [id number] Yes Inhale 2 puffs every 4 hours if needed for wheezing or shortness of breath. Mandeep Tucker,  11/14/2024 Active   budesonide-glycopyr-formoterol (Breztri Aerosphere) 160-9-4.8 mcg/actuation HFA aerosol inhaler [id number] Yes Inhale 2 puffs 2 times a day. Rinse mouth after. Historical Provider, MD  Active   losartan-hydrochlorothiazide (Hyzaar) 50-12.5 mg tablet [id number] Yes TAKE 1 TABLET BY MOUTH EVERY DAY Mandeep Tucker,  11/14/2024 Active   momelotinib (Ojjaara) 100 mg tablet tablet [id number] Yes Take 1 tablet (100 mg) by mouth once daily. Raul Beebe MD  Active   omeprazole (PriLOSEC) 20 mg DR capsule [id number] Yes Take 1 capsule (20 mg) by mouth 2 times a day. Do not crush or chew. Mandeep Tucker,  11/14/2024 Active   Patient not taking:  Discontinued 11/22/24 1356   POTASSIUM CHLORIDE ORAL [id number] Yes Take by mouth. Historical Provider, MD  Active   tadalafil (Cialis) 20 mg tablet [id number]  Take 1 tablet (20 mg) by mouth once daily as needed for erectile dysfunction. Shea Patel, APRN-CNP  "  24 [phone]   varenicline (Chantix) 0.5 mg tablet [id number] Yes Take 1 tablet (0.5 mg) by mouth 2 times a day. Take with full glass of water. Historical Provider, MD 2024 Active                   Assessment/Plan    1) pancytopenia  -I reviewed his lab history in the EMR  -2021 wbc 7.2, hgb 16.2, plt 313,000  -9/15/2021 wbc 8.6, hgb 14.4, plt 242,000  -2023 wbc 11.2, hgb 13.9, plt 248,000  -10/11/2024 wbc 1.8, hgb 9.3, , plt 37,000, , abs lymph 1260 (smear: mild polychromasia, few ovalocytes, few teardrops, basophilic stippling)  -he is fatigued because of the anemia  -he was incarcerated for a number of years, and ended up with fungal infections in his toenails, which probably have nothing to do with his current pancytopenia, which is a fairly new development  -he may have MDS, or hairy cell leukemia, or even AML  -I have recommended proceeding with a bone marrow biopsy as soon as possible  -bmbx done on 2024  reviewed--hypercellular bone marrow (95%) with atypical megakaryocytes and increased fibrosis consistent with myeloproliferative neoplasm; blasts are not increased by morphology or CD34 IHC; there is increased reticulin fibrosis (MF grade 2); molecular studies showed disease associated mutations in DNMT3A and U2AF1 however, no pathogenic mutations in JAK2, MPL or CALR. 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  -case was reviewed in malignant heme TB on 2024--panel consensus was to start with momelotinib and then proceed with transplant evaluation  -he was admitted to [facility] a few days ago--was transfused PRBC  -today we will recheck CBC and then set him up for weekly lab checks--will transfuse if hgb <=7, and platelets <10K  -will refer to Dr James Massey  -benefits, risks, potential morbidity related to momelotinib were reviewed with Jaycee and he signed informed consent to proceed  -today's CBC shows wbc 1.8, hgb 8.1, plt 14,000,   -will  consider antimicrobial prophylaxis if ANC drops to <200  -given marked cytopenias at baseline, will not start momelotinib at full dose--will start at 100 mg daily  -will see him again in 2 weeks after initiation of ojjaara     2) COPD/emphysema  -on proair  -he is not actually dependent on oxygen--he only started using oxygen PRN when he became anemic     3) hypertension  -on losartan-hydrochlorothiazide     4) high BMI  -on phentermine  -on wegovy     5) tobacco abuse  -on chantix     6) lung nodule  -LDCT done on 10/3/2024 showed a 1.7 cm pleural based nodule in the RUL   -PET scan done on 10/7/2024 showed FDG avid pleural based right upper lobe nodule with SUV 4.9; there is mild hypermetabolic activity along the periphery paraseptal emphysematous changes within left lower lobe with SUV 2.9; no FDG avid mediastinal, hilar or axillary lymphadenopathy  -he has seen thoracic surgeon Dr Garcia     Problem List Items Addressed This Visit    None  Visit Diagnoses         Codes    Myelofibrosis (Multi)    -  Primary D75.81    Relevant Medications    momelotinib (Ojjaara) 100 mg tablet tablet    Other Relevant Orders    Referral to Hematology and Oncology                 Raul Beebe MD

## 2024-11-21 ENCOUNTER — SPECIALTY PHARMACY (OUTPATIENT)
Dept: PHARMACY | Facility: CLINIC | Age: 64
End: 2024-11-21

## 2024-11-21 NOTE — PROGRESS NOTES
I met with pt and mother in law.  He learned of new diagnosis of myelofibrosis after meet with Dr. Beebe.  I reviewed labs, neutropenia and precautions to follow.  He was provided information on fever threshold, how to reach our clinic, masking in public/good hand hygiene.  He was receiving additional information from Adrián Sharma RP on the oral chemotherapy medication.  We also reviewed thrombocytopenia.  He appeared overwhelmed, but was able to return demonstrate understanding of need to watch blood counts closely, transfuse as needed.  He will need ongoing support and reinforcement.

## 2024-11-22 ENCOUNTER — OFFICE VISIT (OUTPATIENT)
Dept: PRIMARY CARE | Facility: CLINIC | Age: 64
End: 2024-11-22
Payer: MEDICARE

## 2024-11-22 VITALS
RESPIRATION RATE: 20 BRPM | HEART RATE: 103 BPM | BODY MASS INDEX: 42.52 KG/M2 | WEIGHT: 297 LBS | DIASTOLIC BLOOD PRESSURE: 54 MMHG | HEIGHT: 70 IN | SYSTOLIC BLOOD PRESSURE: 110 MMHG | TEMPERATURE: 98.1 F | OXYGEN SATURATION: 96 %

## 2024-11-22 DIAGNOSIS — G47.19 EXCESSIVE DAYTIME SLEEPINESS: ICD-10-CM

## 2024-11-22 DIAGNOSIS — D61.818 PANCYTOPENIA: ICD-10-CM

## 2024-11-22 DIAGNOSIS — C34.11 MALIGNANT NEOPLASM OF UPPER LOBE OF RIGHT LUNG (MULTI): ICD-10-CM

## 2024-11-22 DIAGNOSIS — R06.83 SNORING: ICD-10-CM

## 2024-11-22 DIAGNOSIS — I10 PRIMARY HYPERTENSION: ICD-10-CM

## 2024-11-22 DIAGNOSIS — D46.Z MYELODYSPLASIA PRESENT IN BONE MARROW (MULTI): Primary | ICD-10-CM

## 2024-11-22 DIAGNOSIS — E66.01 MORBID OBESITY (MULTI): ICD-10-CM

## 2024-11-22 LAB — POC HEMOGLOBIN: 6.3 G/DL (ref 13.5–17.5)

## 2024-11-22 PROCEDURE — 3078F DIAST BP <80 MM HG: CPT | Performed by: FAMILY MEDICINE

## 2024-11-22 PROCEDURE — 3008F BODY MASS INDEX DOCD: CPT | Performed by: FAMILY MEDICINE

## 2024-11-22 PROCEDURE — 1158F ADVNC CARE PLAN TLK DOCD: CPT | Performed by: FAMILY MEDICINE

## 2024-11-22 PROCEDURE — 1159F MED LIST DOCD IN RCRD: CPT | Performed by: FAMILY MEDICINE

## 2024-11-22 PROCEDURE — 85018 HEMOGLOBIN: CPT | Performed by: FAMILY MEDICINE

## 2024-11-22 PROCEDURE — 1123F ACP DISCUSS/DSCN MKR DOCD: CPT | Performed by: FAMILY MEDICINE

## 2024-11-22 PROCEDURE — 1160F RVW MEDS BY RX/DR IN RCRD: CPT | Performed by: FAMILY MEDICINE

## 2024-11-22 PROCEDURE — 3074F SYST BP LT 130 MM HG: CPT | Performed by: FAMILY MEDICINE

## 2024-11-22 PROCEDURE — 99496 TRANSJ CARE MGMT HIGH F2F 7D: CPT | Performed by: FAMILY MEDICINE

## 2024-11-22 PROCEDURE — 1111F DSCHRG MED/CURRENT MED MERGE: CPT | Performed by: FAMILY MEDICINE

## 2024-11-22 RX ORDER — BUDESONIDE, GLYCOPYRROLATE, AND FORMOTEROL FUMARATE 160; 9; 4.8 UG/1; UG/1; UG/1
2 AEROSOL, METERED RESPIRATORY (INHALATION)
COMMUNITY

## 2024-11-22 ASSESSMENT — ENCOUNTER SYMPTOMS
PALPITATIONS: 0
ABDOMINAL PAIN: 0
ADENOPATHY: 0
APPETITE CHANGE: 0
CHEST TIGHTNESS: 0
POLYPHAGIA: 0
SLEEP DISTURBANCE: 0
FLANK PAIN: 0
CONSTITUTIONAL NEGATIVE: 1
EYE PAIN: 0
FEVER: 0
SPEECH DIFFICULTY: 0
RHINORRHEA: 0
ABDOMINAL DISTENTION: 0
NECK STIFFNESS: 0
DIZZINESS: 0
TROUBLE SWALLOWING: 0
DECREASED CONCENTRATION: 0
CONFUSION: 0
DIARRHEA: 0
SORE THROAT: 0
HEADACHES: 0
PHOTOPHOBIA: 0
CONSTIPATION: 0
POLYDIPSIA: 0
SEIZURES: 0
SINUS PRESSURE: 0
ACTIVITY CHANGE: 0
HEMATURIA: 0
RECTAL PAIN: 0
BLOOD IN STOOL: 0
NERVOUS/ANXIOUS: 0
SINUS PAIN: 0
FATIGUE: 0
STRIDOR: 0
DYSURIA: 0
AGITATION: 0
MYALGIAS: 0
DYSPHORIC MOOD: 0
COUGH: 0

## 2024-11-22 NOTE — PATIENT INSTRUCTIONS
Follow up in 1 month    Continue current medications and therapy for chronic medical conditions.    Patient was advised importance of proper diet/nutrition in addition adequate hydration. Patient was encouraged moderate exercise program to include 30 minutes daily for 5 days of the week or 150 minutes weekly. Patient will follow-up with us as scheduled.    Hemoglobin via fingerstick    Follow-up with hematology/oncology as scheduled for November 25    Review recent hospitalization lab results and diagnostic tests    Review postobservation medications and discharge instructions

## 2024-11-22 NOTE — PROGRESS NOTES
Subjective   Patient ID: Jaycee Harp is a 64 y.o. male who presents for Hospital Follow-up.    Patient presents today for [place] [facility] [place]. Was admitted for Anemia due to bone marrow failure, unspecified bone marrow failure type. Patient received a blood infusion. Hemoglobin was 8.1 on 11/20/2024. States he feels great after his infusion. Recommended to have a bone marrow transfusion in the future.    Patient is usually on 3 liters of O2 daily. States at times he feels like the oxygen is too strong and will remove it. Currently placed on 2 L of O2. Oxygen was 83% after walking to room. 96% after 2 minutes of O2 placement.     Patient states he is on a steroid inhaler that he uses twice daily and has to rinse his mouth. Unsure what the inhaler is called   Patient is wondering if he is able to continue the Adipex.          Review of Systems   Constitutional: Negative.  Negative for activity change, appetite change, fatigue and fever.   HENT:  Negative for congestion, dental problem, ear discharge, ear pain, mouth sores, rhinorrhea, sinus pressure, sinus pain, sore throat, tinnitus and trouble swallowing.    Eyes:  Negative for photophobia, pain and visual disturbance.   Respiratory:  Positive for shortness of breath. Negative for cough, chest tightness and stridor.    Cardiovascular:  Negative for chest pain and palpitations.   Gastrointestinal:  Negative for abdominal distention, abdominal pain, blood in stool, constipation, diarrhea and rectal pain.   Endocrine: Negative for cold intolerance, heat intolerance, polydipsia, polyphagia and polyuria.   Genitourinary:  Negative for dysuria, flank pain, hematuria and urgency.   Musculoskeletal:  Positive for arthralgias and gait problem. Negative for myalgias and neck stiffness.   Skin:  Positive for color change. Negative for rash.   Allergic/Immunologic: Negative for environmental allergies and food allergies.  "  Neurological:  Negative for dizziness, seizures, syncope, speech difficulty and headaches.   Hematological:  Negative for adenopathy.   Psychiatric/Behavioral:  Negative for agitation, confusion, decreased concentration, dysphoric mood and sleep disturbance. The patient is not nervous/anxious.        Objective   /54 (BP Location: Left arm, Patient Position: Sitting, BP Cuff Size: Adult)   Pulse 103   Temp 36.7 °C (98.1 °F) (Oral)   Resp 20   Ht 1.778 m (5' 10\")   Wt 135 kg (297 lb)   SpO2 96% Comment: 2 liters  BMI 42.62 kg/m²     Physical Exam  Vitals reviewed.   Constitutional:       General: He is not in acute distress.     Appearance: He is obese. He is not ill-appearing or diaphoretic.   HENT:      Head: Normocephalic.      Right Ear: Tympanic membrane and external ear normal.      Left Ear: Tympanic membrane and external ear normal.      Nose: Nose normal. No congestion.      Mouth/Throat:      Pharynx: No posterior oropharyngeal erythema.   Eyes:      General:         Right eye: No discharge.         Left eye: No discharge.      Extraocular Movements: Extraocular movements intact.      Conjunctiva/sclera: Conjunctivae normal.      Pupils: Pupils are equal, round, and reactive to light.   Cardiovascular:      Rate and Rhythm: Normal rate and regular rhythm.      Pulses: Normal pulses.      Heart sounds: Normal heart sounds. No murmur heard.  Pulmonary:      Effort: Pulmonary effort is normal. No respiratory distress.      Breath sounds: Normal breath sounds. No wheezing or rales.   Chest:      Chest wall: No tenderness.   Abdominal:      General: Bowel sounds are normal. There is distension.      Palpations: There is no mass.      Tenderness: There is no abdominal tenderness. There is no guarding.   Musculoskeletal:         General: No tenderness. Normal range of motion.      Cervical back: Normal range of motion and neck supple. No tenderness.      Right lower leg: No edema.      Left lower " leg: No edema.   Skin:     General: Skin is dry.      Coloration: Skin is pale. Skin is not jaundiced.      Findings: No bruising, erythema or rash.   Neurological:      General: No focal deficit present.      Mental Status: He is alert and oriented to person, place, and time. Mental status is at baseline.      Cranial Nerves: No cranial nerve deficit.      Sensory: No sensory deficit.      Coordination: Coordination abnormal.      Gait: Gait abnormal.   Psychiatric:         Mood and Affect: Mood normal.         Thought Content: Thought content normal.         Judgment: Judgment normal.         Assessment/Plan   Problem List Items Addressed This Visit             ICD-10-CM    Morbid obesity (Multi) E66.01    BMI 40.0-44.9, adult (Multi) Z68.41    Snoring R06.83    Excessive daytime sleepiness G47.19    Malignant neoplasm of upper lobe of right lung (Multi) C34.11    Pancytopenia D61.818    Relevant Orders    POCT hemoglobin manually resulted (Completed)    Primary hypertension I10     Other Visit Diagnoses         Codes    Myelodysplasia present in bone marrow (Multi)    -  Primary D46.Z          Scribe Attestation  By signing my name below, I, Lorraine Gilbert MA , Scribe   attest that this documentation has been prepared under the direction and in the presence of Mandeep Tucker DO.    Provider Attestation - Scribe documentation    All medical record entries made by the Scribe were at my direction and personally dictated by me. I have reviewed the chart and agree that the record accurately reflects my personal performance of the history, physical exam, discussion and plan.

## 2024-11-24 PROBLEM — E66.812 CLASS 2 SEVERE OBESITY DUE TO EXCESS CALORIES WITH SERIOUS COMORBIDITY AND BODY MASS INDEX (BMI) OF 38.0 TO 38.9 IN ADULT: Status: RESOLVED | Noted: 2023-09-26 | Resolved: 2024-11-24

## 2024-11-24 PROBLEM — E66.01 CLASS 2 SEVERE OBESITY DUE TO EXCESS CALORIES WITH SERIOUS COMORBIDITY AND BODY MASS INDEX (BMI) OF 38.0 TO 38.9 IN ADULT: Status: RESOLVED | Noted: 2023-09-26 | Resolved: 2024-11-24

## 2024-11-24 ASSESSMENT — ENCOUNTER SYMPTOMS
COLOR CHANGE: 1
ARTHRALGIAS: 1
SHORTNESS OF BREATH: 1

## 2024-11-25 ENCOUNTER — INFUSION (OUTPATIENT)
Dept: HEMATOLOGY/ONCOLOGY | Facility: CLINIC | Age: 64
End: 2024-11-25
Payer: MEDICARE

## 2024-11-25 ENCOUNTER — TELEPHONE (OUTPATIENT)
Dept: HEMATOLOGY/ONCOLOGY | Facility: CLINIC | Age: 64
End: 2024-11-25

## 2024-11-25 VITALS
BODY MASS INDEX: 44.94 KG/M2 | SYSTOLIC BLOOD PRESSURE: 118 MMHG | WEIGHT: 296.52 LBS | HEART RATE: 91 BPM | HEIGHT: 68 IN | TEMPERATURE: 97.7 F | DIASTOLIC BLOOD PRESSURE: 71 MMHG | OXYGEN SATURATION: 98 % | RESPIRATION RATE: 17 BRPM

## 2024-11-25 DIAGNOSIS — D61.818 PANCYTOPENIA: ICD-10-CM

## 2024-11-25 DIAGNOSIS — D50.9 IRON DEFICIENCY ANEMIA, UNSPECIFIED IRON DEFICIENCY ANEMIA TYPE: ICD-10-CM

## 2024-11-25 DIAGNOSIS — J43.2 CENTRILOBULAR EMPHYSEMA (MULTI): Primary | ICD-10-CM

## 2024-11-25 DIAGNOSIS — D61.9 ANEMIA DUE TO BONE MARROW FAILURE, UNSPECIFIED BONE MARROW FAILURE TYPE (MULTI): ICD-10-CM

## 2024-11-25 LAB
ABO GROUP (TYPE) IN BLOOD: NORMAL
ANTIBODY SCREEN: NORMAL
BASOPHILS # BLD AUTO: 0 X10*3/UL (ref 0–0.1)
BASOPHILS NFR BLD AUTO: 0 %
EOSINOPHIL # BLD AUTO: 0 X10*3/UL (ref 0–0.7)
EOSINOPHIL NFR BLD AUTO: 0 %
ERYTHROCYTE [DISTWIDTH] IN BLOOD BY AUTOMATED COUNT: 17 % (ref 11.5–14.5)
HCT VFR BLD AUTO: 22.6 % (ref 41–52)
HGB BLD-MCNC: 7.6 G/DL (ref 13.5–17.5)
IMM GRANULOCYTES # BLD AUTO: 0.01 X10*3/UL (ref 0–0.7)
IMM GRANULOCYTES NFR BLD AUTO: 0.6 % (ref 0–0.9)
LYMPHOCYTES # BLD AUTO: 1.48 X10*3/UL (ref 1.2–4.8)
LYMPHOCYTES NFR BLD AUTO: 81.8 %
MCH RBC QN AUTO: 33.8 PG (ref 26–34)
MCHC RBC AUTO-ENTMCNC: 33.6 G/DL (ref 32–36)
MCV RBC AUTO: 100 FL (ref 80–100)
MONOCYTES # BLD AUTO: 0.1 X10*3/UL (ref 0.1–1)
MONOCYTES NFR BLD AUTO: 5.5 %
NEUTROPHILS # BLD AUTO: 0.22 X10*3/UL (ref 1.2–7.7)
NEUTROPHILS NFR BLD AUTO: 12.1 %
NRBC BLD-RTO: ABNORMAL /100{WBCS}
PLATELET # BLD AUTO: 15 X10*3/UL (ref 150–450)
RBC # BLD AUTO: 2.25 X10*6/UL (ref 4.5–5.9)
RH FACTOR (ANTIGEN D): NORMAL
WBC # BLD AUTO: 1.8 X10*3/UL (ref 4.4–11.3)

## 2024-11-25 PROCEDURE — 36415 COLL VENOUS BLD VENIPUNCTURE: CPT

## 2024-11-25 PROCEDURE — 85025 COMPLETE CBC W/AUTO DIFF WBC: CPT

## 2024-11-25 PROCEDURE — RXMED WILLOW AMBULATORY MEDICATION CHARGE

## 2024-11-25 PROCEDURE — 86901 BLOOD TYPING SEROLOGIC RH(D): CPT

## 2024-11-25 RX ORDER — DIPHENHYDRAMINE HYDROCHLORIDE 50 MG/ML
50 INJECTION INTRAMUSCULAR; INTRAVENOUS AS NEEDED
OUTPATIENT
Start: 2024-11-25

## 2024-11-25 RX ORDER — ALBUTEROL SULFATE 0.83 MG/ML
3 SOLUTION RESPIRATORY (INHALATION) AS NEEDED
OUTPATIENT
Start: 2024-11-25

## 2024-11-25 RX ORDER — EPINEPHRINE 0.3 MG/.3ML
0.3 INJECTION SUBCUTANEOUS EVERY 5 MIN PRN
OUTPATIENT
Start: 2024-11-25

## 2024-11-25 RX ORDER — FAMOTIDINE 10 MG/ML
20 INJECTION INTRAVENOUS ONCE AS NEEDED
OUTPATIENT
Start: 2024-11-25

## 2024-11-25 ASSESSMENT — PAIN SCALES - GENERAL: PAINLEVEL_OUTOF10: 0-NO PAIN

## 2024-11-26 ENCOUNTER — SPECIALTY PHARMACY (OUTPATIENT)
Dept: PHARMACY | Facility: CLINIC | Age: 64
End: 2024-11-26

## 2024-11-26 LAB
BAND RESOLUTION: 400 BANDS
CHROM ANALY OVERALL INTERP-IMP: NORMAL
CHROMOSOME ANALYSIS CELLS ANALYZED: 1 CELLS
CHROMOSOME ANALYSIS CELLS IMAGED: 0 CELLS
CHROMOSOME ANALYSIS HYPERMODAL CELL COUNT: 0 CELLS
CHROMOSOME ANALYSIS HYPOMODAL CELL COUNT: 0 CELLS
CHROMOSOME ANALYSIS MODAL CHROMOSOME NO: 46 CHROMOSOMES
CHROMOSOME ANALYSIS STAINING METHOD: NORMAL
ELECTRONICALLY SIGNED BY CYTOGENETICS: NORMAL
ELECTRONICALLY SIGNED BY CYTOGENETICS: NORMAL
KARYOTYP MAR: 0 CELLS
MICROARRAY PLATFORM: NORMAL
TOTAL CELLS COUNTED MAR: 1 CELLS

## 2024-11-27 ENCOUNTER — PHARMACY VISIT (OUTPATIENT)
Dept: PHARMACY | Facility: CLINIC | Age: 64
End: 2024-11-27
Payer: COMMERCIAL

## 2024-11-28 PROBLEM — D75.81 MYELOFIBROSIS (MULTI): Status: ACTIVE | Noted: 2024-11-28

## 2024-11-28 ASSESSMENT — ENCOUNTER SYMPTOMS
NERVOUS/ANXIOUS: 1
SHORTNESS OF BREATH: 1
HEMATOLOGIC/LYMPHATIC NEGATIVE: 1
GASTROINTESTINAL NEGATIVE: 1
FATIGUE: 1
EYES NEGATIVE: 1
NEUROLOGICAL NEGATIVE: 1
MUSCULOSKELETAL NEGATIVE: 1
CARDIOVASCULAR NEGATIVE: 1
ENDOCRINE NEGATIVE: 1

## 2024-11-29 ENCOUNTER — TELEPHONE (OUTPATIENT)
Dept: HEMATOLOGY/ONCOLOGY | Facility: CLINIC | Age: 64
End: 2024-11-29
Payer: MEDICARE

## 2024-11-29 DIAGNOSIS — C34.11 MALIGNANT NEOPLASM OF UPPER LOBE OF RIGHT LUNG (MULTI): ICD-10-CM

## 2024-11-29 NOTE — TELEPHONE ENCOUNTER
VM  Patient calling to let office know that he received his chemo pills this morning.  He plans to start taking them on Monday when he sees Dr. Beebe after they check his blood.

## 2024-11-29 NOTE — PROGRESS NOTES
[facility] Clinical Note  Initial Patient Education     Introduction  Jaycee Harp is a 64 y.o. male who is on the specialty pharmacy service for management of: Oncology Core.    Jaycee Harp is initiating the following therapy: momelotinib 100 mg (reduced dose, given cytopenias) PO once daily    Medication receipt date: initial delivery planned for 11/29/24    Duration of therapy: Until drug toxicity or progression    The most recent encounter visit with the referring prescriber Dr. Beebe on 11/20/24 was reviewed.  Pharmacy will continue to collaborate in the care of this patient with the referring prescriber.    Clinical Background  An initial assessment was conducted prior to first fill of the medication to determine the appropriateness of therapy given the patient's diagnosis, medication list, comorbidities, allergies, medical history, patient's ability to self administer medication, and therapeutic goals based on possible outcomes of therapy. Refer to initial assessment task completed in clinic on 11/20/24.    Labs for clinical appropriateness that were reviewed include:   Oncology - CBC-diff:   Lab Results   Component Value Date    WBC 1.8 (L) 11/25/2024    RBC 2.25 (L) 11/25/2024    HGB 7.6 (L) 11/25/2024    HCT 22.6 (L) 11/25/2024     11/25/2024    MCHC 33.6 11/25/2024    PLT 15 (LL) 11/25/2024    RDW 17.0 (H) 11/25/2024    NEUTOPHILPCT 12.1 11/25/2024    IGPCT 0.6 11/25/2024    LYMPHOPCT 81.8 11/25/2024    MONOPCT 5.5 11/25/2024    EOSPCT 0.0 11/25/2024    BASOPCT 0.0 11/25/2024    NEUTROABS 0.22 (L) 11/25/2024    LYMPHSABS 1.48 11/25/2024    MONOSABS 0.10 11/25/2024    EOSABS 0.00 11/25/2024    BASOSABS 0.00 11/25/2024    and CMP:   Lab Results   Component Value Date    GLUCOSE 113 (H) 11/20/2024     11/20/2024    K 4.1 11/20/2024     11/20/2024    CO2 31 11/20/2024    ANIONGAP 9 (L) 11/20/2024    BUN 15 11/20/2024    CREATININE 0.97 11/20/2024    CALCIUM 9.2  11/20/2024    ALBUMIN 4.1 11/20/2024    ALKPHOS 86 11/20/2024    PROT 7.6 11/20/2024    AST 31 11/20/2024    BILITOT 0.7 11/20/2024    ALT 56 (H) 11/20/2024       Education/Discussion  Jaycee was contacted on 11/29/2024 at 6:50 PM for a pharmacy visit with encounter number [id number] from:   [facility] [address] [facility]   [address] [facility]   [address]  [facility] [address]  Dept: [phone]  Dept Fax: [phone]  Jaycee consented to a/an In person visit, which was performed.    Medication Start Date (planned or actual): 11/29/24  Education was conducted prior to start of therapy? Yes    Education discussed includes the following:  Patient Education  Counseled the Patient on the Following : Theraputic rationale and expected outcomes, Expected duration of therapy, Doses and administration, Adherence and missed doses, Possible side effects and management, Possible drug interactions, Lab monitoring and follow-up, Safe handling, storage, and disposal, Pharmacy contact information, Cost of medications  Learner: Patient, Family  Education Method: Explanation  Education Response: Needs reinforcement (Mother-in-law Missy present;  verbalized reinforcement & support.)  Additional details of the medication specific counseling are found within the linked patient education flowsheet.     The follow up timeline was discussed. Every person responds to and reacts to therapy differently. Patient should be assessed for efficacy and tolerability in approximately: 10-14 days    Provided education on goals and possible outcomes of therapy:  Adherence with therapy  Timely completion of appropriate labs  Timely and appropriate follow up with provider  Identify and address medication interactions with presciption medications, OTC medications and supplements  Optimize or maintain quality of life  Oncology: Prolong life/No disease progression  Manage side effects (ex: nausea/vomiting, constipation, fatigue) in  conjunction with care team    The importance of adherence was discussed and they were advised to take the medication as prescribed by their provider.     Impression/Plan  Review and Assessment   Reviewed During This Encounter: Allergies, Medications, Problem list  Medications Assessed for Appropriate Use, Dose, Route, Frequency, and Duration: Yes  Medication Reconciliation Completed: Yes  Drug Interactions Evaluated: Yes  Clinically Relevant Drug Interactions Identified:  (No clinically significant DDI identified.)    This patient has not been identified as high risk due to Lack of high risk qualifiers.  The following action was taken: Patient/caregiver encouraged to participate in patient management program and Engaged patient support system.    QOL/Patient Satisfaction  Rate your quality of life on scale of 1-10: 5    Provided contact information ([phone]) for [facility] and reviewed dispensing process, refill timeline and patient management follow up. Advised to contact the pharmacy if there are any adverse effects and/or changes to medication list, including prescriptions, OTC medications, herbal products, or supplements. Confirmed understanding of education conducted during assessment. All questions and concerns were addressed and patient was encouraged to reach out for additional questions or concerns.    Adrián Sharma RPh, MS, BCOP  Clinical Pharmacy Specialty - Ambulatory Oncology  [facility] [place]

## 2024-11-29 NOTE — TELEPHONE ENCOUNTER
Left detailed message with update from Dr. Beebe and JOEY on Dec 16th. Encouraged patient to call office back with any further questions or concerns

## 2024-12-02 ENCOUNTER — INFUSION (OUTPATIENT)
Dept: HEMATOLOGY/ONCOLOGY | Facility: CLINIC | Age: 64
End: 2024-12-02
Payer: MEDICARE

## 2024-12-02 ENCOUNTER — DOCUMENTATION (OUTPATIENT)
Dept: HEMATOLOGY/ONCOLOGY | Facility: CLINIC | Age: 64
End: 2024-12-02

## 2024-12-02 VITALS
TEMPERATURE: 98.8 F | SYSTOLIC BLOOD PRESSURE: 130 MMHG | OXYGEN SATURATION: 96 % | HEART RATE: 83 BPM | BODY MASS INDEX: 46.17 KG/M2 | DIASTOLIC BLOOD PRESSURE: 63 MMHG | RESPIRATION RATE: 17 BRPM | WEIGHT: 301.15 LBS

## 2024-12-02 DIAGNOSIS — D50.9 IRON DEFICIENCY ANEMIA, UNSPECIFIED IRON DEFICIENCY ANEMIA TYPE: ICD-10-CM

## 2024-12-02 DIAGNOSIS — D61.818 PANCYTOPENIA: ICD-10-CM

## 2024-12-02 DIAGNOSIS — D61.9 ANEMIA DUE TO BONE MARROW FAILURE, UNSPECIFIED BONE MARROW FAILURE TYPE (MULTI): ICD-10-CM

## 2024-12-02 LAB
BASOPHILS # BLD AUTO: 0.01 X10*3/UL (ref 0–0.1)
BASOPHILS NFR BLD AUTO: 0.7 %
EOSINOPHIL # BLD AUTO: 0.01 X10*3/UL (ref 0–0.7)
EOSINOPHIL NFR BLD AUTO: 0.7 %
ERYTHROCYTE [DISTWIDTH] IN BLOOD BY AUTOMATED COUNT: 17.6 % (ref 11.5–14.5)
HCT VFR BLD AUTO: 19 % (ref 41–52)
HGB BLD-MCNC: 6.6 G/DL (ref 13.5–17.5)
IMM GRANULOCYTES # BLD AUTO: 0.01 X10*3/UL (ref 0–0.7)
IMM GRANULOCYTES NFR BLD AUTO: 0.7 % (ref 0–0.9)
LYMPHOCYTES # BLD AUTO: 1.09 X10*3/UL (ref 1.2–4.8)
LYMPHOCYTES NFR BLD AUTO: 76.8 %
MCH RBC QN AUTO: 34.6 PG (ref 26–34)
MCHC RBC AUTO-ENTMCNC: 34.2 G/DL (ref 32–36)
MCV RBC AUTO: 101 FL (ref 80–100)
MONOCYTES # BLD AUTO: 0.1 X10*3/UL (ref 0.1–1)
MONOCYTES NFR BLD AUTO: 7 %
NEUTROPHILS # BLD AUTO: 0.2 X10*3/UL (ref 1.2–7.7)
NEUTROPHILS NFR BLD AUTO: 14.1 %
PLATELET # BLD AUTO: 19 X10*3/UL (ref 150–450)
RBC # BLD AUTO: 1.88 X10*6/UL (ref 4.5–5.9)
WBC # BLD AUTO: 1.4 X10*3/UL (ref 4.4–11.3)

## 2024-12-02 PROCEDURE — 86922 COMPATIBILITY TEST ANTIGLOB: CPT

## 2024-12-02 PROCEDURE — 85025 COMPLETE CBC W/AUTO DIFF WBC: CPT

## 2024-12-02 PROCEDURE — 86880 COOMBS TEST DIRECT: CPT

## 2024-12-02 PROCEDURE — 36415 COLL VENOUS BLD VENIPUNCTURE: CPT

## 2024-12-02 PROCEDURE — 86901 BLOOD TYPING SEROLOGIC RH(D): CPT

## 2024-12-02 RX ORDER — EPINEPHRINE 0.3 MG/.3ML
0.3 INJECTION SUBCUTANEOUS EVERY 5 MIN PRN
Status: CANCELLED | OUTPATIENT
Start: 2024-12-02

## 2024-12-02 RX ORDER — FAMOTIDINE 10 MG/ML
20 INJECTION INTRAVENOUS ONCE AS NEEDED
Status: CANCELLED | OUTPATIENT
Start: 2024-12-02

## 2024-12-02 RX ORDER — ALBUTEROL SULFATE 0.83 MG/ML
3 SOLUTION RESPIRATORY (INHALATION) AS NEEDED
Status: CANCELLED | OUTPATIENT
Start: 2024-12-02

## 2024-12-02 RX ORDER — DIPHENHYDRAMINE HYDROCHLORIDE 50 MG/ML
50 INJECTION INTRAMUSCULAR; INTRAVENOUS AS NEEDED
Status: CANCELLED | OUTPATIENT
Start: 2024-12-02

## 2024-12-02 ASSESSMENT — PAIN SCALES - GENERAL: PAINLEVEL_OUTOF10: 5

## 2024-12-02 NOTE — PROGRESS NOTES
Today 12/2/24 I met Jaycee for follow-up education regarding his new momelotinib 100 mg PO once daily therapy.  He is here with his partner Elza and mother-in-law Missy at [facility] for labs.    We reviewed basics of his new MF diagnosis with anemia.  We then discussed use of momelotinib as therapy for his MF with anemia.  Drug categorization, dosing, adverse effects and safe handling guidelines were reviewed in detail.  Much time was spent on Elza's questions and concerns related to potential adverse effects and safe handling guidelines.    Jaycee will begin his momelotinib therapy today 12/2/24.    As a result of today's CBC/d (Hgb 6.6, PLTs 19) Jaycee will return tomorrow 12/3/24 for a blood transfusion.  He has an upcoming referral appt with Dr. Mirza Massey on 12/5/24; and weekly lab visits on Monday, next is 12/9/24.  He also will see Dr. Beebe on 12/16/24.    No further issues at this time.

## 2024-12-03 ENCOUNTER — INFUSION (OUTPATIENT)
Dept: HEMATOLOGY/ONCOLOGY | Facility: CLINIC | Age: 64
End: 2024-12-03
Payer: MEDICARE

## 2024-12-03 ENCOUNTER — TELEPHONE (OUTPATIENT)
Dept: PRIMARY CARE | Facility: CLINIC | Age: 64
End: 2024-12-03
Payer: MEDICARE

## 2024-12-03 VITALS
SYSTOLIC BLOOD PRESSURE: 117 MMHG | WEIGHT: 298.28 LBS | RESPIRATION RATE: 18 BRPM | BODY MASS INDEX: 45.73 KG/M2 | HEART RATE: 80 BPM | DIASTOLIC BLOOD PRESSURE: 63 MMHG | OXYGEN SATURATION: 98 % | TEMPERATURE: 98.6 F

## 2024-12-03 DIAGNOSIS — D61.9 ANEMIA DUE TO BONE MARROW FAILURE, UNSPECIFIED BONE MARROW FAILURE TYPE (MULTI): ICD-10-CM

## 2024-12-03 DIAGNOSIS — D61.818 PANCYTOPENIA: ICD-10-CM

## 2024-12-03 LAB
ABO GROUP (TYPE) IN BLOOD: NORMAL
ANTIBODY SCREEN: NORMAL
BLOOD EXPIRATION DATE: NORMAL
DAT-POLYSPECIFIC: NORMAL
DISPENSE STATUS: NORMAL
PRODUCT BLOOD TYPE: 5100
PRODUCT CODE: NORMAL
RH FACTOR (ANTIGEN D): NORMAL
UNIT ABO: NORMAL
UNIT NUMBER: NORMAL
UNIT RH: NORMAL
UNIT VOLUME: 350
XM INTEP: NORMAL

## 2024-12-03 PROCEDURE — P9040 RBC LEUKOREDUCED IRRADIATED: HCPCS

## 2024-12-03 PROCEDURE — 86850 RBC ANTIBODY SCREEN: CPT

## 2024-12-03 PROCEDURE — 86885 COOMBS TEST INDIRECT QUAL: CPT

## 2024-12-03 PROCEDURE — 36430 TRANSFUSION BLD/BLD COMPNT: CPT

## 2024-12-03 PROCEDURE — 86870 RBC ANTIBODY IDENTIFICATION: CPT

## 2024-12-03 RX ORDER — FAMOTIDINE 10 MG/ML
20 INJECTION INTRAVENOUS ONCE AS NEEDED
OUTPATIENT
Start: 2024-12-03

## 2024-12-03 RX ORDER — ALBUTEROL SULFATE 0.83 MG/ML
3 SOLUTION RESPIRATORY (INHALATION) AS NEEDED
OUTPATIENT
Start: 2024-12-03

## 2024-12-03 RX ORDER — DIPHENHYDRAMINE HYDROCHLORIDE 50 MG/ML
50 INJECTION INTRAMUSCULAR; INTRAVENOUS AS NEEDED
OUTPATIENT
Start: 2024-12-03

## 2024-12-03 RX ORDER — EPINEPHRINE 0.3 MG/.3ML
0.3 INJECTION SUBCUTANEOUS EVERY 5 MIN PRN
OUTPATIENT
Start: 2024-12-03

## 2024-12-03 ASSESSMENT — PAIN SCALES - GENERAL: PAINLEVEL_OUTOF10: 5

## 2024-12-03 NOTE — TELEPHONE ENCOUNTER
PT OF AMADO     PT CALLED IN B/C HE'S HAVING ISSUES RELAXING WITH ALL HE'S GOING THROUGH(AMADO IS AWARE). HE WOULD LIKE AN SCRIPT OF THE MEDICATION HE AND AMADO DISCUSSED TO HELP HIM RELAX AND CALM.    PLEASE ADVISE

## 2024-12-04 ENCOUNTER — TELEMEDICINE (OUTPATIENT)
Dept: PRIMARY CARE | Facility: CLINIC | Age: 64
End: 2024-12-04
Payer: MEDICARE

## 2024-12-04 ENCOUNTER — PATIENT OUTREACH (OUTPATIENT)
Dept: PRIMARY CARE | Facility: CLINIC | Age: 64
End: 2024-12-04

## 2024-12-04 DIAGNOSIS — F41.9 ANXIETY: Primary | ICD-10-CM

## 2024-12-04 PROCEDURE — 99442 PR PHYS/QHP TELEPHONE EVALUATION 11-20 MIN: CPT | Performed by: FAMILY MEDICINE

## 2024-12-04 RX ORDER — HYDROXYZINE PAMOATE 25 MG/1
25 CAPSULE ORAL EVERY 6 HOURS PRN
Qty: 30 CAPSULE | Refills: 0 | Status: SHIPPED | OUTPATIENT
Start: 2024-12-04 | End: 2024-12-14

## 2024-12-04 ASSESSMENT — ENCOUNTER SYMPTOMS
SEIZURES: 0
STRIDOR: 0
POLYDIPSIA: 0
PHOTOPHOBIA: 0
DYSURIA: 0
DIARRHEA: 0
CONSTITUTIONAL NEGATIVE: 1
CONFUSION: 0
DECREASED CONCENTRATION: 0
BLOOD IN STOOL: 0
SPEECH DIFFICULTY: 0
PALPITATIONS: 0
ACTIVITY CHANGE: 0
CONSTIPATION: 0
APPETITE CHANGE: 0
SHORTNESS OF BREATH: 0
SLEEP DISTURBANCE: 1
FATIGUE: 0
TROUBLE SWALLOWING: 0
DIZZINESS: 0
NERVOUS/ANXIOUS: 1
NECK STIFFNESS: 0
CHEST TIGHTNESS: 0
COLOR CHANGE: 0
COUGH: 0
RECTAL PAIN: 0
MYALGIAS: 0
POLYPHAGIA: 0
HEADACHES: 0
SINUS PAIN: 0
DYSPHORIC MOOD: 0
RHINORRHEA: 0
FLANK PAIN: 0
AGITATION: 1
ARTHRALGIAS: 0
SINUS PRESSURE: 0
ADENOPATHY: 0
FEVER: 0
HEMATURIA: 0
ABDOMINAL DISTENTION: 0
SORE THROAT: 0
EYE PAIN: 0
ABDOMINAL PAIN: 0

## 2024-12-04 NOTE — PROGRESS NOTES
Call regarding appt. with PCP on 11/22/24 after hospitalization.  At time of outreach call the patient feels as if their condition has (returned to baseline) since last visit. Pt reports he was started on a new medication from Dr Beebe and has been having nausea. Pt will be discussing this symptom with Dr Tucker today at his virtual visit.  Reviewed the PCP appointment with the pt and addressed any questions or concerns.

## 2024-12-04 NOTE — PROGRESS NOTES
Subjective   Patient ID: Jaycee Harp is a 64 y.o. male who presents for No chief complaint on file..    HPI patient admits to increased anxiety since recently starting Ojjaara.  He admits he cannot sit still.  He did have a blood transfusion yesterday and does think it helped.  Denies any fever, chills or nausea or emesis    Review of Systems   Constitutional: Negative.  Negative for activity change, appetite change, fatigue and fever.   HENT:  Negative for congestion, dental problem, ear discharge, ear pain, mouth sores, rhinorrhea, sinus pressure, sinus pain, sore throat, tinnitus and trouble swallowing.    Eyes:  Negative for photophobia, pain and visual disturbance.   Respiratory:  Negative for cough, chest tightness, shortness of breath and stridor.    Cardiovascular:  Negative for chest pain and palpitations.   Gastrointestinal:  Negative for abdominal distention, abdominal pain, blood in stool, constipation, diarrhea and rectal pain.   Endocrine: Negative for cold intolerance, heat intolerance, polydipsia, polyphagia and polyuria.   Genitourinary:  Negative for dysuria, flank pain, hematuria and urgency.   Musculoskeletal:  Negative for arthralgias, gait problem, myalgias and neck stiffness.   Skin:  Negative for color change and rash.   Allergic/Immunologic: Negative for environmental allergies and food allergies.   Neurological:  Negative for dizziness, seizures, syncope, speech difficulty and headaches.   Hematological:  Negative for adenopathy.   Psychiatric/Behavioral:  Positive for agitation and sleep disturbance. Negative for confusion, decreased concentration and dysphoric mood. The patient is nervous/anxious.        Objective   There were no vitals taken for this visit.    Physical Exam  Neurological:      Mental Status: He is alert and oriented to person, place, and time.   Psychiatric:         Mood and Affect: Mood normal.         Behavior: Behavior normal.         Thought Content: Thought content  normal.         Assessment/Plan   Problem List Items Addressed This Visit    None  Visit Diagnoses         Codes    Anxiety    -  Primary F41.9    Relevant Medications    hydrOXYzine pamoate (VistariL) 25 mg capsule          Consent obtained by Jaycee Harp for audio communication. Total time for this audio communication visit is 12 minutes.      Provider Attestation - Scribe documentation    All medical record entries made by the Scribe were at my direction and personally dictated by me. I have reviewed the chart and agree that the record accurately reflects my personal performance of the history, physical exam, discussion and plan.

## 2024-12-05 ENCOUNTER — TELEPHONE (OUTPATIENT)
Dept: ADMISSION | Facility: HOSPITAL | Age: 64
End: 2024-12-05
Payer: MEDICARE

## 2024-12-05 ENCOUNTER — LAB (OUTPATIENT)
Dept: LAB | Facility: CLINIC | Age: 64
End: 2024-12-05
Payer: MEDICARE

## 2024-12-05 ENCOUNTER — OFFICE VISIT (OUTPATIENT)
Dept: HEMATOLOGY/ONCOLOGY | Facility: CLINIC | Age: 64
End: 2024-12-05
Payer: MEDICARE

## 2024-12-05 VITALS
HEART RATE: 102 BPM | TEMPERATURE: 96.8 F | BODY MASS INDEX: 45.83 KG/M2 | RESPIRATION RATE: 16 BRPM | OXYGEN SATURATION: 92 % | SYSTOLIC BLOOD PRESSURE: 133 MMHG | WEIGHT: 298.94 LBS | DIASTOLIC BLOOD PRESSURE: 73 MMHG

## 2024-12-05 DIAGNOSIS — Z76.82 STEM CELL TRANSPLANT CANDIDATE: ICD-10-CM

## 2024-12-05 DIAGNOSIS — R91.1 LUNG NODULE: Primary | ICD-10-CM

## 2024-12-05 DIAGNOSIS — D75.81 MYELOFIBROSIS (MULTI): ICD-10-CM

## 2024-12-05 LAB
ALBUMIN SERPL BCP-MCNC: 4.1 G/DL (ref 3.4–5)
ALP SERPL-CCNC: 89 U/L (ref 33–136)
ALT SERPL W P-5'-P-CCNC: 74 U/L (ref 10–52)
ANION GAP SERPL CALC-SCNC: 8 MMOL/L (ref 10–20)
AST SERPL W P-5'-P-CCNC: 42 U/L (ref 9–39)
BASOPHILS # BLD AUTO: 0 X10*3/UL (ref 0–0.1)
BASOPHILS NFR BLD AUTO: 0 %
BB ANTIBODY IDENTIFICATION: NORMAL
BILIRUB SERPL-MCNC: 0.7 MG/DL (ref 0–1.2)
BUN SERPL-MCNC: 18 MG/DL (ref 6–23)
CALCIUM SERPL-MCNC: 9.2 MG/DL (ref 8.6–10.3)
CASE #: NORMAL
CHLORIDE SERPL-SCNC: 100 MMOL/L (ref 98–107)
CO2 SERPL-SCNC: 32 MMOL/L (ref 21–32)
CREAT SERPL-MCNC: 1.01 MG/DL (ref 0.5–1.3)
EGFRCR SERPLBLD CKD-EPI 2021: 83 ML/MIN/1.73M*2
EOSINOPHIL # BLD AUTO: 0 X10*3/UL (ref 0–0.7)
EOSINOPHIL NFR BLD AUTO: 0 %
ERYTHROCYTE [DISTWIDTH] IN BLOOD BY AUTOMATED COUNT: 18.9 % (ref 11.5–14.5)
GLUCOSE SERPL-MCNC: 107 MG/DL (ref 74–99)
HCT VFR BLD AUTO: 20 % (ref 41–52)
HGB BLD-MCNC: 6.8 G/DL (ref 13.5–17.5)
IMM GRANULOCYTES # BLD AUTO: 0 X10*3/UL (ref 0–0.7)
IMM GRANULOCYTES NFR BLD AUTO: 0 % (ref 0–0.9)
LDH SERPL L TO P-CCNC: 298 U/L (ref 84–246)
LYMPHOCYTES # BLD AUTO: 1.81 X10*3/UL (ref 1.2–4.8)
LYMPHOCYTES NFR BLD AUTO: 87 %
MCH RBC QN AUTO: 33.3 PG (ref 26–34)
MCHC RBC AUTO-ENTMCNC: 34 G/DL (ref 32–36)
MCV RBC AUTO: 98 FL (ref 80–100)
MONOCYTES # BLD AUTO: 0.08 X10*3/UL (ref 0.1–1)
MONOCYTES NFR BLD AUTO: 3.8 %
NEUTROPHILS # BLD AUTO: 0.19 X10*3/UL (ref 1.2–7.7)
NEUTROPHILS NFR BLD AUTO: 9.2 %
PLATELET # BLD AUTO: 18 X10*3/UL (ref 150–450)
POTASSIUM SERPL-SCNC: 4 MMOL/L (ref 3.5–5.3)
PROT SERPL-MCNC: 7.6 G/DL (ref 6.4–8.2)
RBC # BLD AUTO: 2.04 X10*6/UL (ref 4.5–5.9)
SODIUM SERPL-SCNC: 136 MMOL/L (ref 136–145)
URATE SERPL-MCNC: 5.4 MG/DL (ref 4–7.5)
WBC # BLD AUTO: 2.1 X10*3/UL (ref 4.4–11.3)

## 2024-12-05 PROCEDURE — 86695 HERPES SIMPLEX TYPE 1 TEST: CPT

## 2024-12-05 PROCEDURE — 86644 CMV ANTIBODY: CPT

## 2024-12-05 PROCEDURE — 1111F DSCHRG MED/CURRENT MED MERGE: CPT | Performed by: INTERNAL MEDICINE

## 2024-12-05 PROCEDURE — 86833 HLA CLASS II HIGH DEFIN QUAL: CPT

## 2024-12-05 PROCEDURE — 87340 HEPATITIS B SURFACE AG IA: CPT

## 2024-12-05 PROCEDURE — 1126F AMNT PAIN NOTED NONE PRSNT: CPT | Performed by: INTERNAL MEDICINE

## 2024-12-05 PROCEDURE — 81379 HLA I TYPING COMPLETE HR: CPT

## 2024-12-05 PROCEDURE — 86787 VARICELLA-ZOSTER ANTIBODY: CPT

## 2024-12-05 PROCEDURE — 86803 HEPATITIS C AB TEST: CPT

## 2024-12-05 PROCEDURE — 86664 EPSTEIN-BARR NUCLEAR ANTIGEN: CPT

## 2024-12-05 PROCEDURE — 83615 LACTATE (LD) (LDH) ENZYME: CPT

## 2024-12-05 PROCEDURE — 81382 HLA II TYPING 1 LOC HR: CPT | Mod: 59

## 2024-12-05 PROCEDURE — 86663 EPSTEIN-BARR ANTIBODY: CPT

## 2024-12-05 PROCEDURE — 1123F ACP DISCUSS/DSCN MKR DOCD: CPT | Performed by: INTERNAL MEDICINE

## 2024-12-05 PROCEDURE — 80053 COMPREHEN METABOLIC PANEL: CPT

## 2024-12-05 PROCEDURE — 99215 OFFICE O/P EST HI 40 MIN: CPT | Performed by: INTERNAL MEDICINE

## 2024-12-05 PROCEDURE — 84550 ASSAY OF BLOOD/URIC ACID: CPT

## 2024-12-05 PROCEDURE — G2211 COMPLEX E/M VISIT ADD ON: HCPCS | Performed by: INTERNAL MEDICINE

## 2024-12-05 PROCEDURE — 36415 COLL VENOUS BLD VENIPUNCTURE: CPT

## 2024-12-05 PROCEDURE — 86790 VIRUS ANTIBODY NOS: CPT

## 2024-12-05 PROCEDURE — 86705 HEP B CORE ANTIBODY IGM: CPT

## 2024-12-05 PROCEDURE — 86704 HEP B CORE ANTIBODY TOTAL: CPT

## 2024-12-05 PROCEDURE — 86696 HERPES SIMPLEX TYPE 2 TEST: CPT

## 2024-12-05 PROCEDURE — 87389 HIV-1 AG W/HIV-1&-2 AB AG IA: CPT

## 2024-12-05 PROCEDURE — 86780 TREPONEMA PALLIDUM: CPT

## 2024-12-05 PROCEDURE — 86777 TOXOPLASMA ANTIBODY: CPT

## 2024-12-05 PROCEDURE — 85025 COMPLETE CBC W/AUTO DIFF WBC: CPT

## 2024-12-05 PROCEDURE — 86706 HEP B SURFACE ANTIBODY: CPT

## 2024-12-05 PROCEDURE — 86832 HLA CLASS I HIGH DEFIN QUAL: CPT

## 2024-12-05 PROCEDURE — 3075F SYST BP GE 130 - 139MM HG: CPT | Performed by: INTERNAL MEDICINE

## 2024-12-05 PROCEDURE — 3078F DIAST BP <80 MM HG: CPT | Performed by: INTERNAL MEDICINE

## 2024-12-05 PROCEDURE — 86645 CMV ANTIBODY IGM: CPT

## 2024-12-05 PROCEDURE — 1159F MED LIST DOCD IN RCRD: CPT | Performed by: INTERNAL MEDICINE

## 2024-12-05 ASSESSMENT — NCCN CANCER DISTRESS MANAGEMENT
NCCN EMOTIONAL CONCERNS: 6
NCCN PHYSICAL CONCERNS: 6
NCCN EMOTIONAL CONCERNS: 4
NCCN EMOTIONAL CONCERNS: 2
NCCN EMOTIONAL CONCERNS: 8
NCCN PHYSICAL CONCERNS: 9
NCCN PHYSICAL CONCERNS: 3
NCCN PHYSICAL CONCERNS: 1
NCCN EMOTIONAL CONCERNS: 1

## 2024-12-05 ASSESSMENT — PAIN SCALES - GENERAL: PAINLEVEL_OUTOF10: 0-NO PAIN

## 2024-12-05 NOTE — LETTER
2024      TO: DO Mandeep Keen,   [address]  [place] [address]       Regarding:   Patient:  :  Visit Date: Jaycee Harp  1960       Dear Dr. Mandeep Tucker DO     I saw our mutual patient Jaycee Harp for an interval visit in the malignant hematology clinic at [facility].  Please see below for my notes from this encounter. Please do not hesitate to call me if you have any questions. I look forward to continuing to follow your patient along with you.      Sincerely,    Mirza Massey MD         CC: Mandeep Tucker DO  [address] [facility] [address]  [place] [address]  Via In Basket    Raul Beebe MD  [address] [place] [address] [address] [place] [address]  Via In Basket    Mandeep Tucker DO  [address] [place] [address] [address]  [place] [address]       Please see my documentation below:   Patient ID: Jaycee Harp is a 64 y.o. male.  Referring Physician: Raul Beebe MD  [address] [place] [address] [place] [address]  Primary Care Provider: Mandeep Tucker DO    Date of Service:  2024    Assessment & Plan  Myelofibrosis (Multi)  2024: Mr. Harp is here to discuss his high risk myeloproliferative neoplasm, with a diagnosis favoring primary myelofibrosis, although this would be a triple negative myelofibrosis in the absence of JAK2, MPL, VICKEY R, and if the clinical behavior shifts it may be reasonable to consider alternative diagnoses such as MDS/MPN NOS, however at this time he is is primarily manifested by low blood counts, including transfusion dependence.    Given the severity of his cytopenias, this represents high risk disease.  I reviewed with mr. Harp treatment options, given the severity of his thrombocytopenia and transfusion dependent anemia some form of intervention to try to improve his blood counts prove his quality of life is very appropriate.  Momelotinib is a option that may be able to improve his blood counts, if this is not  effective we could consider alternative options such as growth factors.  In the absence of increased blasts, we typically do not consider using hypomethylating agents for myelofibrosis.    However concerned about his overall prognosis without being able to receive an allogenic stem cell transplant.  We reviewed that I would like to have him fully evaluated for stem cell transplant, but that there may be significant barriers, including his comorbid conditions.  Most concerned about a potential active lung nodule that is currently has deferred workup and the severity of his low blood counts.  I do think that if a biopsy is indicated we could be completed with platelet transfusion to mitigate the risk of bleeding, I am also concerned that there could be significant social barriers to considering transplant.  Nevertheless I recommend we refer to the program consider an initial donor search to see what options are out there.  By both MIPSS70 and MIPSS70 2.0 he is high and very high risk disease.     I believe he will need to continue to have very close follow-up of his blood counts for supportive care reasons, and to monitor for effects of momelotinib.   Diagnostics:  - none further   Treatment:  - continue momelotinib as prescribed by Dr. Beebe - has only just started   Disease/Toxicity Monitoring:  - CBC weekly   Supportive Care:  - Weekly CBC for transfusion dependence   Antimicrobial Prophylaxis:   - We did not discuss today, but likely add acyclovir   IV access:  - PIV as needed - may consider port    Lung nodule  12/5/2024: seen by Dr. Rodriguez in Thoracic Surgery who recommended resection if feasible, but severe thrombocytopenia is likely a significant barrier to being able to receive this  Stem cell transplant candidate  Based on the severity of his myelofibrosis, workup for stem cell transplant is probably the most appropriate step - however, I am concern that he may have significant barriers to successful  transplant, including his comorbid conditions, as well as potential social barriers.  However, I recommended we refer to the program and initiate donor search.  Organ function testing and social work evaluation can fully evaluate his potential risks and benefits.     I reviewed the basic concept and curative potential of hemopoietic stem cell transplant, but emphasized that decisions to consider transplant are based around risk::benefit, and if the risk of adverse outcome is substantially high, we often recommend against or will not pursue an allogeneic stem cell transplant.   Plan:  - HLA typing  - HLA class I and II ab profile   - alloviral panel   - referral to the alloSCT for evaluation         Oncology History   Myelofibrosis (Multi)   11/28/2024 Initial Diagnosis    Myeloproliferative Neoplasm - favor Primary Myelofibrosis  Diagnosis:     BONE MARROW CLOT WITH ASPIRATE AND CORE WITH TOUCH PREP, LEFT ILIAC CREST:    --HYPERCELLULAR BONE MARROW (95%) WITH ATYPICAL MEGAKARYOCYTES AND INCREASED FIBROSIS CONSISTENT WITH MYELOPROLIFERATIVE NEOPLASM, SEE NOTE.    NOTE: Sections show increased and morphologically atypical megakaryocytes frequently arranged in clusters in a slightly hypercellular bone marrow. Blasts are not increased by morphology or CD34 immunohistochemical stain. There is increaased reticulin fibrosis (MF grade 2). There is not definitive morphologic evidence of dysplasia. 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 The overall findings are most consistent with a myeloproliferative neoplasm, favor primary myelofibrosis. Chromosomal analysis and FISH studies will be attempted and results will be reported separately. Correlation with clinical findings is recommended              Subjective     Mr. Harp is here here for 2nd opinion of his recently diagnosed primary myelofibrosis     He usually  sees his family doctor, Dr. Tucker, and he reported that in 2023, he had blood in his urine, and was getting medicaid through welfare, and went to see a urologist, Dr. 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  He saw Dr. Tucker and X-rays found a lung nodule, and then his blood was pretty bad, and was referred to Dr. Beebe.  He also started a breathing doctor, and they were told the counts were really low. Then Dr. Beebe had to tell him to go to the blood doctor, and was recommended to be hospitalized, and felt a lot better after two units of blood.     Mr. Harp recalls from the challenges with his blood counts.  He was worried that he needs a transplant.  He knows it's called myelofibrosis.  Recently started momelotinib, He also noticed swollen.      At rest, seems like he doesn't need oxygen at baseline.           Myeloproliferative Neoplasm Total Symptom Score:   Fatigue (weariness, tiredness) What number describes your WORST level of fatigue during past 24 hours 3   Early Satiety (Filling up quickly) 0   Abdominal Discomfort 5   Inactivity 9   Problems with concentration, compared to prior to MPN diagnosis 8   Itching (pruritis) 0   Dizziness 0   Bone pain (diffuse not joint pain or  arthritis) 0   Fever (> 100F or 38 C) 6   Unintentional Weight Loss last six months 0    Total = 31     History of Present Illness:      Past Medical History:  No major medical issues  Active lung nodule     Past Surgical History:  No major surgery     Family History:   Diabetes  Father - colon cancer, tuberculosis  Father - Govind  disease   Two brothers and one sister - one brother is disease, and health of other is poor - all of them are older siblings.   Has three kids, all in good health.  Two girls and three boys (had two others who passed away    Social History:  Quit all etoh and tobacco  Has a history of heorid, acid use. Nothing since diagnosis. (Last heroin was 2016).  He does use edibles.   Lives with spouse, has two dogs and two cats.    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  Hasn't been able to do anything since getting sick.        Review of Systems    Home Medications and Adherence Reviewed with Patient.       Objective      VS:  /73 (BP Location: Right arm, Patient Position: Sitting, BP Cuff Size: Large adult)   Pulse 102   Temp 36 °C (96.8 °F) (Temporal)   Resp 16   Wt 136 kg (298 lb 15.1 oz)   SpO2 92%   BMI 45.83 kg/m²   BSA: 2.55 meters squared    Physical Exam    Laboratory:  Infusion on 12/03/2024   Component Date Value Ref Range Status    PRODUCT CODE 12/02/2024 [id number]   Final    Unit Number 12/02/2024 [id number]   Final    Unit ABO 12/02/2024 O   Final    Unit RH 12/02/2024 POS   Final    XM INTEP 12/02/2024 COMP   Final    Dispense Status 12/02/2024 TR   Final    Blood Expiration Date 12/02/2024 12/11/2024 11:59:00 PM EST   Final    PRODUCT BLOOD TYPE 12/02/2024 5100   Final    UNIT VOLUME 12/02/2024 350   Final-Edited         Pathology:  Bone Marrow Biopsy:   FINAL DIAGNOSIS   Date Value Ref Range Status   11/01/2024   Final    A, B & C. BONE MARROW CLOT WITH ASPIRATE AND CORE WITH TOUCH PREP, LEFT ILIAC CREST:    --HYPERCELLULAR BONE MARROW (95%) WITH ATYPICAL MEGAKARYOCYTES AND INCREASED FIBROSIS CONSISTENT WITH MYELOPROLIFERATIVE NEOPLASM, SEE NOTE.    NOTE: Sections show increased and morphologically atypical megakaryocytes frequently arranged in clusters in a slightly  hypercellular bone marrow. Blasts are not increased by morphology or CD34 immunohistochemical stain. There is increaased reticulin fibrosis (MF grade 2). There is not definitive morphologic evidence of dysplasia. 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 The overall findings are most consistent with a myeloproliferative neoplasm, favor primary myelofibrosis. Chromosomal analysis and FISH studies will be attempted and results will be reported separately. Correlation with clinical findings is recommended.       Bone Marrow Differential   Date Value Ref Range Status   11/01/2024   Final    Not performed (aspicular and markedly paucicellular aspirate smears and touch prep).       Microscopic Description   Date Value Ref Range Status   11/01/2024   Final    PERIPHERAL SMEAR: Submitted              Red cells: Macrocytic anemia with rare dacrocytes.       White cells: Leukopenia predominantly comprised of small mature appearing lymphocytes.         Platelets: Thrombocytopenia.       Comments: Rare blast noted.    ASPIRATE SMEAR: Submitted                      Specimen: Aspicular, paucicellular, and hemodilute.    TOUCH PREP: Submitted       Specimen: Acellular.     ASPIRATE CLOT: Submitted                           Specimen: Aspicular.    CORE BIOPSY: Submitted                            Specimen: Adequate.       Cellularity: 95%       Estimated M:E ratio: approximately 1.0:1.5       Bony trabeculae: Normal.       Megakaryocytes: Increased. Morphology: Atypical with large pale bulbous lobules, frequently arranged in loose clusters.       Granulomas: Absent.       Lymphoid aggregates: Minute aggregate of small lymphocytes..    SPECIAL STAINS:         Iron: Non-evaluable (aspicular clot section).       Reticulin: Moderate increase (MF Grade 2).       Trichrome: Incipient collagenous  "fibrosis.    IMMUNOHISTOCHEMISTRY: Performed.       CD3: Highlights scattered interstitial and nodular T-cells (8-10%).       CD20: Highlights few interstitial B-cells with some admixed with T-cells in lymphoid aggregate (1-2%).       CD34: Blasts are not increased (1%).       : Highlights immature myeloid and erythroid progenitors, not increased. Mast cells are few, scattered, and show unremarkable morphology.       E-cadherin: Highlights few scattered erythroid islands.       MPO: Highlights majority of marrow elements. M:E ratio is approximately 2:1.    FLOW CYTOMETRY: Performed, see separate report. In brief, slight phenotypic atypia was found in small population of myeloblasts and monocytes.    Immunostains were performed in addition to flow cytometry to fully characterize the phenotype, architecture, and extent of the marrow population(s).  This was medically necessary for the best possible diagnosis.      This report was written in conjunction the hematopathology fellow Rosalia ROLAND.       Gross Description   Date Value Ref Range Status   11/01/2024   Final    A:  Received in formalin, labeled with the patient's name and hospital number and \"left iliac crest\", is an irregular fragment of blood clot measuring 1.5 x 0.7 x 0.6 cm. The specimen is submitted in toto in one cassette.   K/SBS  B:  Received in B-plus fixative, labeled with the patient's name and hospital number and \"left iliac crest\", is a cylindrical segment of bone measuring 1.3 x 0.3 x 0.2 cm. The specimen is submitted in toto in one cassette following decalcification in [facility].   JEK/SBS                      Mirza Massey MD                      "

## 2024-12-05 NOTE — TELEPHONE ENCOUNTER
I spoke with Alondra and she said the critical lab were the platelets at 18. Messaged team and fellow on call.

## 2024-12-05 NOTE — PROGRESS NOTES
Patient ID: Jaycee Harp is a 64 y.o. male.  Referring Physician: Raul Beebe MD  [id number]  [place] [address]  Primary Care Provider: Mandeep Tucker DO    Date of Service:  12/5/2024    Assessment & Plan  Myelofibrosis (Multi)  12/6/2024: Mr. Harp is here to discuss his high risk myeloproliferative neoplasm, with a diagnosis favoring primary myelofibrosis, although this would be a triple negative myelofibrosis in the absence of JAK2, MPL, VICKEY R, and if the clinical behavior shifts it may be reasonable to consider alternative diagnoses such as MDS/MPN NOS, however at this time he is is primarily manifested by low blood counts, including transfusion dependence.    Given the severity of his cytopenias, this represents high risk disease.  I reviewed with mr. Harp treatment options, given the severity of his thrombocytopenia and transfusion dependent anemia some form of intervention to try to improve his blood counts prove his quality of life is very appropriate.  Momelotinib is a option that may be able to improve his blood counts, if this is not effective we could consider alternative options such as growth factors.  In the absence of increased blasts, we typically do not consider using hypomethylating agents for myelofibrosis.    However concerned about his overall prognosis without being able to receive an allogenic stem cell transplant.  We reviewed that I would like to have him fully evaluated for stem cell transplant, but that there may be significant barriers, including his comorbid conditions.  Most concerned about a potential active lung nodule that is currently has deferred workup and the severity of his low blood counts.  I do think that if a biopsy is indicated we could be completed with platelet transfusion to mitigate the risk of bleeding, I am also concerned that there could be significant social barriers to considering transplant.  Nevertheless I recommend we refer to the  program consider an initial donor search to see what options are out there.  By both MIPSS70 and MIPSS70 2.0 he is high and very high risk disease.     I believe he will need to continue to have very close follow-up of his blood counts for supportive care reasons, and to monitor for effects of momelotinib.   Diagnostics:  - none further   Treatment:  - continue momelotinib as prescribed by Dr. Beebe - has only just started   Disease/Toxicity Monitoring:  - CBC weekly   Supportive Care:  - Weekly CBC for transfusion dependence   Antimicrobial Prophylaxis:   - We did not discuss today, but likely add acyclovir   IV access:  - PIV as needed - may consider port    Lung nodule  12/5/2024: seen by Dr. Rodriguez in Thoracic Surgery who recommended resection if feasible, but severe thrombocytopenia is likely a significant barrier to being able to receive this  Stem cell transplant candidate  Based on the severity of his myelofibrosis, workup for stem cell transplant is probably the most appropriate step - however, I am concern that he may have significant barriers to successful transplant, including his comorbid conditions, as well as potential social barriers.  However, I recommended we refer to the program and initiate donor search.  Organ function testing and social work evaluation can fully evaluate his potential risks and benefits.     I reviewed the basic concept and curative potential of hemopoietic stem cell transplant, but emphasized that decisions to consider transplant are based around risk::benefit, and if the risk of adverse outcome is substantially high, we often recommend against or will not pursue an allogeneic stem cell transplant.   Plan:  - HLA typing  - HLA class I and II ab profile   - alloviral panel   - referral to the alloSCT for evaluation         Oncology History   Myelofibrosis (Multi)   11/28/2024 Initial Diagnosis    Myeloproliferative Neoplasm - favor Primary Myelofibrosis  Diagnosis:     BONE  MARROW CLOT WITH ASPIRATE AND CORE WITH TOUCH PREP, LEFT ILIAC CREST:    --HYPERCELLULAR BONE MARROW (95%) WITH ATYPICAL MEGAKARYOCYTES AND INCREASED FIBROSIS CONSISTENT WITH MYELOPROLIFERATIVE NEOPLASM, SEE NOTE.    NOTE: Sections show increased and morphologically atypical megakaryocytes frequently arranged in clusters in a slightly hypercellular bone marrow. Blasts are not increased by morphology or CD34 immunohistochemical stain. There is increaased reticulin fibrosis (MF grade 2). There is not definitive morphologic evidence of dysplasia. 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 The overall findings are most consistent with a myeloproliferative neoplasm, favor primary myelofibrosis. Chromosomal analysis and FISH studies will be attempted and results will be reported separately. Correlation with clinical findings is recommended              Subjective     Mr. Harp is here here for 2nd opinion of his recently diagnosed primary myelofibrosis     He usually sees his family doctor, Dr. Tucker, and he reported that in 2023, he had blood in his urine, and was getting medicaid through welfare, and went to see a urologist, Dr. 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  He saw Dr. Tucker and X-rays found a lung nodule, and then his blood was pretty bad, and was referred to Dr. Beebe.  He also started a breathing doctor, and they were told the counts were really low. Then Dr. Beebe had to tell him to go to the  blood doctor, and was recommended to be hospitalized, and felt a lot better after two units of blood.     Mr. Harp recalls from the challenges with his blood counts.  He was worried that he needs a transplant.  He knows it's called myelofibrosis.  Recently started momelotinib, He also noticed swollen.      At rest, seems like he doesn't need oxygen at baseline.           Myeloproliferative Neoplasm Total Symptom Score:   Fatigue (weariness, tiredness) What number describes your WORST level of fatigue during past 24 hours 3   Early Satiety (Filling up quickly) 0   Abdominal Discomfort 5   Inactivity 9   Problems with concentration, compared to prior to MPN diagnosis 8   Itching (pruritis) 0   Dizziness 0   Bone pain (diffuse not joint pain or  arthritis) 0   Fever (> 100F or 38 C) 6   Unintentional Weight Loss last six months 0    Total = 31     History of Present Illness:      Past Medical History:  No major medical issues  Active lung nodule     Past Surgical History:  No major surgery     Family History:   Diabetes  Father - colon cancer, tuberculosis  Father - Swift disease   Two brothers and one sister - one brother is disease, and health of other is poor - all of them are older siblings.   Has three kids, all in good health.  Two girls and three boys (had two others who passed away    Social History:  Quit all etoh and tobacco  Has a history of heorid, acid use. Nothing since diagnosis. (Last heroin was 2016).  He does use edibles.   Lives with spouse, has two dogs and two cats.    Used to work at [employer] for 17 years, then got into troule, went to jail for 21 years.  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  Hasn't been able to do anything since getting sick.        Review of Systems    Home Medications and Adherence Reviewed with Patient.       Objective      VS:  /73 (BP Location: Right arm, Patient Position: Sitting, BP Cuff Size: Large adult)   Pulse 102   Temp 36  °C (96.8 °F) (Temporal)   Resp 16   Wt 136 kg (298 lb 15.1 oz)   SpO2 92%   BMI 45.83 kg/m²   BSA: 2.55 meters squared    Physical Exam    Laboratory:  Infusion on 12/03/2024   Component Date Value Ref Range Status    PRODUCT CODE 12/02/2024 [id number]   Final    Unit Number 12/02/2024 [id number]   Final    Unit ABO 12/02/2024 O   Final    Unit RH 12/02/2024 POS   Final    XM INTEP 12/02/2024 COMP   Final    Dispense Status 12/02/2024 TR   Final    Blood Expiration Date 12/02/2024 12/11/2024 11:59:00 PM EST   Final    PRODUCT BLOOD TYPE 12/02/2024 5100   Final    UNIT VOLUME 12/02/2024 350   Final-Edited         Pathology:  Bone Marrow Biopsy:   FINAL DIAGNOSIS   Date Value Ref Range Status   11/01/2024   Final    A, B & C. BONE MARROW CLOT WITH ASPIRATE AND CORE WITH TOUCH PREP, LEFT ILIAC CREST:    --HYPERCELLULAR BONE MARROW (95%) WITH ATYPICAL MEGAKARYOCYTES AND INCREASED FIBROSIS CONSISTENT WITH MYELOPROLIFERATIVE NEOPLASM, SEE NOTE.    NOTE: Sections show increased and morphologically atypical megakaryocytes frequently arranged in clusters in a slightly hypercellular bone marrow. Blasts are not increased by morphology or CD34 immunohistochemical stain. There is increaased reticulin fibrosis (MF grade 2). There is not definitive morphologic evidence of dysplasia. 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 The overall findings are most consistent with a myeloproliferative neoplasm, favor primary myelofibrosis. Chromosomal analysis and FISH studies will be attempted and results will be reported separately. Correlation with clinical findings is recommended.       Bone Marrow Differential   Date Value Ref Range Status   11/01/2024   Final    Not performed (aspicular and markedly paucicellular aspirate smears and touch prep).       Microscopic Description   Date Value Ref Range Status   11/01/2024    Final    PERIPHERAL SMEAR: Submitted              Red cells: Macrocytic anemia with rare dacrocytes.       White cells: Leukopenia predominantly comprised of small mature appearing lymphocytes.         Platelets: Thrombocytopenia.       Comments: Rare blast noted.    ASPIRATE SMEAR: Submitted                      Specimen: Aspicular, paucicellular, and hemodilute.    TOUCH PREP: Submitted       Specimen: Acellular.     ASPIRATE CLOT: Submitted                           Specimen: Aspicular.    CORE BIOPSY: Submitted                            Specimen: Adequate.       Cellularity: 95%       Estimated M:E ratio: approximately 1.0:1.5       Bony trabeculae: Normal.       Megakaryocytes: Increased. Morphology: Atypical with large pale bulbous lobules, frequently arranged in loose clusters.       Granulomas: Absent.       Lymphoid aggregates: Minute aggregate of small lymphocytes..    SPECIAL STAINS:         Iron: Non-evaluable (aspicular clot section).       Reticulin: Moderate increase (MF Grade 2).       Trichrome: Incipient collagenous fibrosis.    IMMUNOHISTOCHEMISTRY: Performed.       CD3: Highlights scattered interstitial and nodular T-cells (8-10%).       CD20: Highlights few interstitial B-cells with some admixed with T-cells in lymphoid aggregate (1-2%).       CD34: Blasts are not increased (1%).       : Highlights immature myeloid and erythroid progenitors, not increased. Mast cells are few, scattered, and show unremarkable morphology.       E-cadherin: Highlights few scattered erythroid islands.       MPO: Highlights majority of marrow elements. M:E ratio is approximately 2:1.    FLOW CYTOMETRY: Performed, see separate report. In brief, slight phenotypic atypia was found in small population of myeloblasts and monocytes.    Immunostains were performed in addition to flow cytometry to fully characterize the phenotype, architecture, and extent of the marrow population(s).  This was medically necessary for  "the best possible diagnosis.      This report was written in conjunction the hematopathology fellow Rosalia ROLAND.       Gross Description   Date Value Ref Range Status   11/01/2024   Final    A:  Received in formalin, labeled with the patient's name and hospital number and \"left iliac crest\", is an irregular fragment of blood clot measuring 1.5 x 0.7 x 0.6 cm. The specimen is submitted in toto in one cassette.   HAIRK/SBS  B:  Received in B-plus fixative, labeled with the patient's name and hospital number and \"left iliac crest\", is a cylindrical segment of bone measuring 1.3 x 0.3 x 0.2 cm. The specimen is submitted in toto in one cassette following decalcification in [facility].   JEK/SBS                      Mirza Massey MD    "

## 2024-12-06 LAB
CMV IGG AVIDITY SERPL IA-RTO: REACTIVE %
EBV EA IGG SER QL: POSITIVE
EBV NA AB SER QL: POSITIVE
EBV VCA IGG SER IA-ACNC: POSITIVE
EBV VCA IGM SER IA-ACNC: NEGATIVE
HBV CORE AB SER QL: NONREACTIVE
HBV CORE IGM SER QL: NONREACTIVE
HBV SURFACE AB SER-ACNC: <3.1 MIU/ML
HBV SURFACE AG SERPL QL IA: NONREACTIVE
HCV AB SER QL: NONREACTIVE
HERPES SIMPLEX VIRUS 1 IGG: >8 INDEX
HERPES SIMPLEX VIRUS 2 IGG: 6.9 INDEX
HIV 1+2 AB+HIV1 P24 AG SERPL QL IA: NONREACTIVE
HLA RESULTS: NORMAL
HLA-A+B+C AB NFR SER: NORMAL %
HLA-DP+DQ+DR AB NFR SER: NORMAL %
T GONDII IGG SER-ACNC: NONREACTIVE
TREPONEMA PALLIDUM IGG+IGM AB [PRESENCE] IN SERUM OR PLASMA BY IMMUNOASSAY: NONREACTIVE
VARICELLA ZOSTER IGG INDEX: 2.2 IA
VZV IGG SER QL IA: POSITIVE

## 2024-12-07 LAB
CMV IGM SERPL-ACNC: <8 AU/ML
HTLV I+II AB SER QL IA: NEGATIVE

## 2024-12-09 ENCOUNTER — INFUSION (OUTPATIENT)
Dept: HEMATOLOGY/ONCOLOGY | Facility: CLINIC | Age: 64
End: 2024-12-09
Payer: MEDICARE

## 2024-12-09 ENCOUNTER — LAB REQUISITION (OUTPATIENT)
Dept: LAB | Facility: HOSPITAL | Age: 64
End: 2024-12-09
Payer: MEDICARE

## 2024-12-09 VITALS
WEIGHT: 297.3 LBS | BODY MASS INDEX: 45.58 KG/M2 | DIASTOLIC BLOOD PRESSURE: 82 MMHG | TEMPERATURE: 97 F | RESPIRATION RATE: 17 BRPM | OXYGEN SATURATION: 95 % | SYSTOLIC BLOOD PRESSURE: 137 MMHG | HEART RATE: 95 BPM

## 2024-12-09 DIAGNOSIS — D61.9 APLASTIC ANEMIA, UNSPECIFIED (MULTI): ICD-10-CM

## 2024-12-09 DIAGNOSIS — D61.818 PANCYTOPENIA: ICD-10-CM

## 2024-12-09 DIAGNOSIS — D61.9 ANEMIA DUE TO BONE MARROW FAILURE, UNSPECIFIED BONE MARROW FAILURE TYPE (MULTI): ICD-10-CM

## 2024-12-09 DIAGNOSIS — D50.9 IRON DEFICIENCY ANEMIA, UNSPECIFIED IRON DEFICIENCY ANEMIA TYPE: ICD-10-CM

## 2024-12-09 LAB
ABO GROUP (TYPE) IN BLOOD: NORMAL
ANTIBODY SCREEN: NORMAL
BASOPHILS # BLD AUTO: 0 X10*3/UL (ref 0–0.1)
BASOPHILS NFR BLD AUTO: 0 %
DAT-COMPLEMENT: NORMAL
DAT-IGG: NORMAL
DAT-POLYSPECIFIC: NORMAL
EOSINOPHIL # BLD AUTO: 0.01 X10*3/UL (ref 0–0.7)
EOSINOPHIL NFR BLD AUTO: 0.5 %
ERYTHROCYTE [DISTWIDTH] IN BLOOD BY AUTOMATED COUNT: 18.5 % (ref 11.5–14.5)
HCT VFR BLD AUTO: 20.6 % (ref 41–52)
HGB BLD-MCNC: 6.8 G/DL (ref 13.5–17.5)
IMM GRANULOCYTES # BLD AUTO: 0.01 X10*3/UL (ref 0–0.7)
IMM GRANULOCYTES NFR BLD AUTO: 0.5 % (ref 0–0.9)
LYMPHOCYTES # BLD AUTO: 1.56 X10*3/UL (ref 1.2–4.8)
LYMPHOCYTES NFR BLD AUTO: 83.9 %
MCH RBC QN AUTO: 32.7 PG (ref 26–34)
MCHC RBC AUTO-ENTMCNC: 33 G/DL (ref 32–36)
MCV RBC AUTO: 99 FL (ref 80–100)
MONOCYTES # BLD AUTO: 0.1 X10*3/UL (ref 0.1–1)
MONOCYTES NFR BLD AUTO: 5.4 %
NEUTROPHILS # BLD AUTO: 0.18 X10*3/UL (ref 1.2–7.7)
NEUTROPHILS NFR BLD AUTO: 9.7 %
PATH REV-IMMUNOHEMATOLOGY-PR30: NORMAL
PLATELET # BLD AUTO: 13 X10*3/UL (ref 150–450)
RBC # BLD AUTO: 2.08 X10*6/UL (ref 4.5–5.9)
RH FACTOR (ANTIGEN D): NORMAL
WBC # BLD AUTO: 1.9 X10*3/UL (ref 4.4–11.3)

## 2024-12-09 PROCEDURE — 36415 COLL VENOUS BLD VENIPUNCTURE: CPT

## 2024-12-09 PROCEDURE — 85025 COMPLETE CBC W/AUTO DIFF WBC: CPT

## 2024-12-09 PROCEDURE — 86901 BLOOD TYPING SEROLOGIC RH(D): CPT

## 2024-12-09 PROCEDURE — 86860 RBC ANTIBODY ELUTION: CPT

## 2024-12-09 PROCEDURE — 86900 BLOOD TYPING SEROLOGIC ABO: CPT

## 2024-12-09 PROCEDURE — 86077 PHYS BLOOD BANK SERV XMATCH: CPT | Performed by: INTERNAL MEDICINE

## 2024-12-09 PROCEDURE — 86870 RBC ANTIBODY IDENTIFICATION: CPT

## 2024-12-09 PROCEDURE — 86880 COOMBS TEST DIRECT: CPT

## 2024-12-09 PROCEDURE — 86850 RBC ANTIBODY SCREEN: CPT

## 2024-12-09 RX ORDER — DIPHENHYDRAMINE HYDROCHLORIDE 50 MG/ML
50 INJECTION INTRAMUSCULAR; INTRAVENOUS AS NEEDED
Status: CANCELLED | OUTPATIENT
Start: 2024-12-09

## 2024-12-09 RX ORDER — FAMOTIDINE 10 MG/ML
20 INJECTION INTRAVENOUS ONCE AS NEEDED
Status: CANCELLED | OUTPATIENT
Start: 2024-12-09

## 2024-12-09 RX ORDER — EPINEPHRINE 0.3 MG/.3ML
0.3 INJECTION SUBCUTANEOUS EVERY 5 MIN PRN
Status: CANCELLED | OUTPATIENT
Start: 2024-12-09

## 2024-12-09 RX ORDER — ALBUTEROL SULFATE 0.83 MG/ML
3 SOLUTION RESPIRATORY (INHALATION) AS NEEDED
Status: CANCELLED | OUTPATIENT
Start: 2024-12-09

## 2024-12-09 ASSESSMENT — PAIN SCALES - GENERAL: PAINLEVEL_OUTOF10: 0-NO PAIN

## 2024-12-10 ENCOUNTER — PATIENT OUTREACH (OUTPATIENT)
Dept: PRIMARY CARE | Facility: CLINIC | Age: 64
End: 2024-12-10
Payer: MEDICARE

## 2024-12-10 ENCOUNTER — INFUSION (OUTPATIENT)
Dept: HEMATOLOGY/ONCOLOGY | Facility: CLINIC | Age: 64
End: 2024-12-10
Payer: MEDICARE

## 2024-12-10 VITALS
OXYGEN SATURATION: 98 % | RESPIRATION RATE: 17 BRPM | SYSTOLIC BLOOD PRESSURE: 146 MMHG | WEIGHT: 299.16 LBS | DIASTOLIC BLOOD PRESSURE: 75 MMHG | HEART RATE: 98 BPM | TEMPERATURE: 97.9 F | BODY MASS INDEX: 45.87 KG/M2

## 2024-12-10 DIAGNOSIS — D75.81 MYELOFIBROSIS (MULTI): ICD-10-CM

## 2024-12-10 DIAGNOSIS — D61.818 PANCYTOPENIA: ICD-10-CM

## 2024-12-10 DIAGNOSIS — D61.9 ANEMIA DUE TO BONE MARROW FAILURE, UNSPECIFIED BONE MARROW FAILURE TYPE (MULTI): ICD-10-CM

## 2024-12-10 DIAGNOSIS — D50.9 IRON DEFICIENCY ANEMIA, UNSPECIFIED IRON DEFICIENCY ANEMIA TYPE: ICD-10-CM

## 2024-12-10 LAB
ABO GROUP (TYPE) IN BLOOD: NORMAL
ANTIBODY SCREEN: NORMAL
RH FACTOR (ANTIGEN D): NORMAL

## 2024-12-10 PROCEDURE — 86922 COMPATIBILITY TEST ANTIGLOB: CPT

## 2024-12-10 PROCEDURE — 86870 RBC ANTIBODY IDENTIFICATION: CPT | Mod: MUE

## 2024-12-10 PROCEDURE — 86900 BLOOD TYPING SEROLOGIC ABO: CPT

## 2024-12-10 PROCEDURE — 36415 COLL VENOUS BLD VENIPUNCTURE: CPT

## 2024-12-10 PROCEDURE — 86906 BLD TYPING SEROLOGIC RH PHNT: CPT

## 2024-12-10 PROCEDURE — 86880 COOMBS TEST DIRECT: CPT

## 2024-12-10 PROCEDURE — 86860 RBC ANTIBODY ELUTION: CPT

## 2024-12-10 PROCEDURE — 86901 BLOOD TYPING SEROLOGIC RH(D): CPT

## 2024-12-10 RX ORDER — EPINEPHRINE 0.3 MG/.3ML
0.3 INJECTION SUBCUTANEOUS EVERY 5 MIN PRN
Status: CANCELLED | OUTPATIENT
Start: 2024-12-10

## 2024-12-10 RX ORDER — ALBUTEROL SULFATE 0.83 MG/ML
3 SOLUTION RESPIRATORY (INHALATION) AS NEEDED
Status: CANCELLED | OUTPATIENT
Start: 2024-12-10

## 2024-12-10 RX ORDER — DIPHENHYDRAMINE HYDROCHLORIDE 50 MG/ML
50 INJECTION INTRAMUSCULAR; INTRAVENOUS AS NEEDED
Status: CANCELLED | OUTPATIENT
Start: 2024-12-10

## 2024-12-10 RX ORDER — FAMOTIDINE 10 MG/ML
20 INJECTION INTRAVENOUS ONCE AS NEEDED
Status: CANCELLED | OUTPATIENT
Start: 2024-12-10

## 2024-12-10 ASSESSMENT — PAIN SCALES - GENERAL: PAINLEVEL_OUTOF10: 6

## 2024-12-11 ENCOUNTER — INFUSION (OUTPATIENT)
Dept: HEMATOLOGY/ONCOLOGY | Facility: CLINIC | Age: 64
End: 2024-12-11
Payer: MEDICARE

## 2024-12-11 ENCOUNTER — NUTRITION (OUTPATIENT)
Dept: HEMATOLOGY/ONCOLOGY | Facility: CLINIC | Age: 64
End: 2024-12-11
Payer: MEDICARE

## 2024-12-11 VITALS
DIASTOLIC BLOOD PRESSURE: 81 MMHG | WEIGHT: 295.86 LBS | OXYGEN SATURATION: 99 % | BODY MASS INDEX: 45.36 KG/M2 | HEART RATE: 78 BPM | RESPIRATION RATE: 18 BRPM | TEMPERATURE: 99.3 F | SYSTOLIC BLOOD PRESSURE: 148 MMHG

## 2024-12-11 VITALS — HEIGHT: 68 IN | WEIGHT: 295.86 LBS | BODY MASS INDEX: 44.84 KG/M2

## 2024-12-11 DIAGNOSIS — D61.9 ANEMIA DUE TO BONE MARROW FAILURE, UNSPECIFIED BONE MARROW FAILURE TYPE (MULTI): ICD-10-CM

## 2024-12-11 LAB
BB ANTIBODY IDENTIFICATION: NORMAL
BLOOD EXPIRATION DATE: NORMAL
CASE #: NORMAL
DISPENSE STATUS: NORMAL
PATH REV-IMMUNOHEMATOLOGY-PR30: NORMAL
PRODUCT BLOOD TYPE: 5100
PRODUCT CODE: NORMAL
UNIT ABO: NORMAL
UNIT NUMBER: NORMAL
UNIT RH: NORMAL
UNIT VOLUME: 350
XM INTEP: NORMAL

## 2024-12-11 PROCEDURE — 86902 BLOOD TYPE ANTIGEN DONOR EA: CPT

## 2024-12-11 PROCEDURE — P9040 RBC LEUKOREDUCED IRRADIATED: HCPCS

## 2024-12-11 PROCEDURE — 36430 TRANSFUSION BLD/BLD COMPNT: CPT

## 2024-12-11 RX ORDER — ALBUTEROL SULFATE 0.83 MG/ML
3 SOLUTION RESPIRATORY (INHALATION) AS NEEDED
OUTPATIENT
Start: 2024-12-11

## 2024-12-11 RX ORDER — DIPHENHYDRAMINE HYDROCHLORIDE 50 MG/ML
50 INJECTION INTRAMUSCULAR; INTRAVENOUS AS NEEDED
OUTPATIENT
Start: 2024-12-11

## 2024-12-11 RX ORDER — EPINEPHRINE 0.3 MG/.3ML
0.3 INJECTION SUBCUTANEOUS EVERY 5 MIN PRN
OUTPATIENT
Start: 2024-12-11

## 2024-12-11 RX ORDER — FAMOTIDINE 10 MG/ML
20 INJECTION INTRAVENOUS ONCE AS NEEDED
OUTPATIENT
Start: 2024-12-11

## 2024-12-11 ASSESSMENT — PAIN SCALES - GENERAL: PAINLEVEL_OUTOF10: 0-NO PAIN

## 2024-12-11 NOTE — PROGRESS NOTES
"NUTRITION Assessment NOTE    Nutrition Assessment     Reason for Visit:  Jaycee Harp is a 64 y.o. male seen this day per pt request.  He requested to see RDN \"as I eat everything\".  Visit with patient during his blood transfusion at [facility].      Lab Results   Component Value Date/Time    GLUCOSE 107 (H) 12/05/2024 1627     12/05/2024 1627    K 4.0 12/05/2024 1627     12/05/2024 1627    CO2 32 12/05/2024 1627    ANIONGAP 8 (L) 12/05/2024 1627    BUN 18 12/05/2024 1627    CREATININE 1.01 12/05/2024 1627    EGFR 83 12/05/2024 1627    CALCIUM 9.2 12/05/2024 1627    ALBUMIN 4.1 12/05/2024 1627    ALKPHOS 89 12/05/2024 1627    PROT 7.6 12/05/2024 1627    AST 42 (H) 12/05/2024 1627    BILITOT 0.7 12/05/2024 1627    ALT 74 (H) 12/05/2024 1627     Lab Results   Component Value Date/Time    VITD25 21 (A) 01/13/2021 0846       Anthropometrics:  Anthropometrics  Height: 172 cm (5' 7.72\")  Weight: 134 kg (295 lb 13.7 oz)  BMI (Calculated): 45.36        Wt Readings from Last 10 Encounters:   12/11/24 134 kg (295 lb 13.7 oz)   12/11/24 134 kg (295 lb 13.7 oz)   12/10/24 136 kg (299 lb 2.6 oz)   12/09/24 135 kg (297 lb 4.8 oz)   12/05/24 136 kg (298 lb 15.1 oz)   12/03/24 135 kg (298 lb 4.5 oz)   12/02/24 137 kg (301 lb 2.4 oz)   11/25/24 135 kg (296 lb 8.3 oz)   11/22/24 135 kg (297 lb)   11/20/24 133 kg (292 lb 15.9 oz)        Food And Nutrient Intake:  Food and Nutrient History  Food and Nutrient History: Patient reported that he eats a lot.  He stated he has anxiety and feels he eats when he feels anxious.  He stated he eats large portions. He opened up today about his history of incarceration, recent diagnosis of myelofibrosis, about his recent sons death.  He reported he has a big family and they cook for him, bringing his favorite foods over.  Energy Intake: Good > 75 %                                                              Nutrition Focused Physical Exam Findings:                          Energy " "Needs  Calculated Energy Needs Using Equations  Height: 172 cm (5' 7.72\")  Estimated Energy Needs  Total Energy Estimated Needs (kCal): 2000 kCal  Total Estimated Energy Need per Day (kCal/kg): 15 kCal/kg  Estimated Protein Needs  Total Protein Estimated Needs (g): 105 g  Total Protein Estimated Needs (g/kg): 0.8 g/kg        Nutrition Diagnosis        Nutrition Diagnosis  Patient has Nutrition Diagnosis: Yes  Diagnosis Status (1): Ongoing  Nutrition Diagnosis 1: Obese  Related to (1): excessive caloric intake with sedentary lifestyle  As Evidenced by (1): pt interview and diet recall, BMI of 43.71    Nutrition Interventions/Recommendations   Nutrition Prescription  Individualized Nutrition Prescription Provided for : Following general healhful diet; decrease portion sizes; trial of other activities such as painting (he stated he likes to paint) instead of eating when feeling anxious,    Food and Nutrition Delivery  Food and Nutrition Delivery  Meals & Snacks: General Healthful Diet    Nutrition Education  Nutrition Education  Nutrition Education Content: Content related nutrition education  Goals: Discussion and support provided during visit today; suggested tools to try to use instead of eating when feeling anxious.  Noted pt had visited with RDGELY back in October of 2023 as well.    Coordination of Care  Coordination of Nutrition Care by a Nutrition Professional  Collaboration and referral of nutrition care: Collaboration by nutrition professional with other providers    There are no Patient Instructions on file for this visit.    Nutrition Monitoring and Evaluation   Food/Nutrient Related History Monitoring  Monitoring and Evaluation Plan: Energy intake, Amount of food, Meal/snack pattern, Protein intake        Time Spent  Prep time on day of patient encounter: 10 minutes  Time spent directly with patient, family or caregiver: 40 minutes  Additional Time Spent on Patient Care Activities: 0 minutes  Documentation " Time: 15 minutes  Other Time Spent: 0 minutes  Total: 65 minutes

## 2024-12-11 NOTE — PROGRESS NOTES
SUPPORTIVE AND PALLIATIVE ONCOLOGY CONSULT - OUTPATIENT      SERVICE DATE: 12/13/2024    Referred by:  Raul Beebe MD  Medical Oncologist: MD Mirza Fernández MD   Radiation Oncologist: No care team member to display  Primary Physician: Mandeep Tucker  [phone]    REASON FOR CONSULT/CHIEF CONSULT COMPLAINT: pain management, other symptom control, and Introduction to Supportive and Palliative Oncology Services    Subjective   HISTORY OF PRESENT ILLNESS: Jaycee Harp is a 64 y.o. male who presents with PMHx of COPD, HTN, heroin and acid (last 2016), and new diagnosis 11/2024 of Myelofibrosis, started on momelotinib. Patient has been referred to Supportive Oncology/Palliative Care for pain and further symptom management.    Pain Assessment:  Pain Score:    Location:    Description:      Symptom Assessment:  Pain:{ :45769}  Numbness or Tingling in hands/feet/other: { :15035}  Sore Muscles/Spasms: { :25403}  Headache: {  :94837}  Dizziness:{ :81177}  Constipation: { :60730}  Diarrhea: { :36199}  Nausea: { :49876}  Vomiting: { :24343}  Lack of Appetite: {  :74393}   Weight Loss: { :22196}  Taste changes: { :88145}  Dry Mouth: { :50559}  Pain in Mouth/Swallowing: { :15687}  Lack of Energy: { :12308}  Difficulty Sleeping: { :48616}  Worrying: {  :23128}  Anxiety: { :25195}  Depression: { :32318}  Shortness of breath: { :01940}  Other: { :52393}      Information obtained from: { :41728}  ______________________________________________________________________     Oncology History   Myelofibrosis (Multi)   11/28/2024 Initial Diagnosis    Myeloproliferative Neoplasm - favor Primary Myelofibrosis  Diagnosis:     BONE MARROW CLOT WITH ASPIRATE AND CORE WITH TOUCH PREP, LEFT ILIAC CREST:    --HYPERCELLULAR BONE MARROW (95%) WITH ATYPICAL MEGAKARYOCYTES AND INCREASED FIBROSIS CONSISTENT WITH MYELOPROLIFERATIVE NEOPLASM, SEE NOTE.    NOTE: Sections show increased and morphologically atypical  megakaryocytes frequently arranged in clusters in a slightly hypercellular bone marrow. Blasts are not increased by morphology or CD34 immunohistochemical stain. There is increaased reticulin fibrosis (MF grade 2). There is not definitive morphologic evidence of dysplasia. 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 The overall findings are most consistent with a myeloproliferative neoplasm, favor primary myelofibrosis. Chromosomal analysis and FISH studies will be attempted and results will be reported separately. Correlation with clinical findings is recommended         Past Medical History:   Diagnosis Date    Contact with and (suspected) exposure to covid-19 08/28/2020    Exposure to 2019 novel coronavirus    Hematuria 10/12/2023    Personal history of other endocrine, nutritional and metabolic disease 01/13/2021    History of obesity    Personal history of other specified conditions 08/28/2020    History of nasal congestion     Past Surgical History:   Procedure Laterality Date    OTHER SURGICAL HISTORY  10/31/2019    Rectal surgery     Family History   Problem Relation Name Age of Onset    Diabetes Mother      Other (varicose veins) Mother      Liver cancer Mother      Diabetes Father      Tuberculosis Father          SOCIAL HISTORY  {SOC HX:60396}   Social History:  reports that he quit smoking about 53 years ago. His smoking use included cigarettes. He started smoking about 2 months ago. He has been exposed to tobacco smoke. He has quit using smokeless tobacco. He reports that he does not currently use alcohol after a past usage of about 24.0 standard drinks of alcohol per week. He reports current drug use. Drug: Marijuana.  Quit all etoh and tobacco  Has a history of heroin, acid use. Nothing since diagnosis. (Last heroin was 2016).  He does use edibles.   Lives with spouse, has two dogs and two  cats.    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  Hasn't been able to do anything since getting sick.        REVIEW OF SYSTEMS  Review of systems negative unless noted in HPI.       Objective     Current Outpatient Medications   Medication Instructions    albuterol (ProAir HFA) 90 mcg/actuation inhaler 2 puffs, inhalation, Every 4 hours PRN    budesonide-glycopyr-formoterol (Breztri Aerosphere) 160-9-4.8 mcg/actuation HFA aerosol inhaler 2 puffs, 2 times daily RT    hydrOXYzine pamoate (VISTARIL) 25 mg, oral, Every 6 hours PRN    losartan-hydrochlorothiazide (Hyzaar) 50-12.5 mg tablet 1 tablet, oral, Daily    Ojjaara 100 mg, oral, Daily    omeprazole (PRILOSEC) 20 mg, oral, 2 times daily, Do not crush or chew.    POTASSIUM CHLORIDE ORAL Take by mouth.    tadalafil (CIALIS) 20 mg, oral, Daily PRN    varenicline (CHANTIX) 0.5 mg, 2 times daily       Allergies: No Known Allergies    {If you would like to pull in Lab results for the last 24 hours, type .[id number] :99}  {If you would like to pull in Imaging results, type .imgrslt :99}       PHYSICAL EXAMINATION  Vital Signs:   Vital signs reviewed  There were no vitals filed for this visit.  Pain Score:           Physical Exam    ASSESSMENT/PLAN    Pain  Pain is: { :11438}  Type: { :11246}  Pain control: { :65626}  Home regimen: ***  Intolerances/previously tried: ***  Personalized pain goal: ***    Opioid Use  Medication Management:   - OARRS report reviewed with no aberrant behavior; consistent with  prescriptions/records and patient history  - MED ***.  Overdose Risk Score ***.   This has been discussed with patient.   - We will continue to closely monitor the patient for signs of prescription misuse including UDS, OARRS review and subjective reports at each visit.  - *** concurrent benzodiazepine use   - I am a provider who either is or has  consulted and collaborated with a provider certified in Hospice and Palliative Medicine and have conducted a face-face visit and examination for this patient.  - Routine Urine Drug Screen completed *** appropriately positive for opioids and negative for illicit substances  - Controlled Substance Agreement completed ***  - Specifically discussed that controlled substance prescriptions will only be provided by our group as outlined in the completed agreement  - Prescribed naloxone ***  - Red Flags: ***    Constipation   At risk for constipation related to opioids, ***,  { :08279}   Usual bowel pattern: ***   Current regimen: ***   LBM ***   ***     Nausea   { :18819} nausea {with or without:14346} vomiting related to { :12784} ***    Decreased Appetite  Related to { :34005}  Nutrition { :68572}  Weight loss ***  Current regimen:  ***  ***    Altered Mood  {Acute/Chronic:84706} {anxiety/depression:14758} related to {related to:27923}   {controlled/uncontrolled:36219} with home regimen  Current regimen: ***    Sleeping Difficulty:  Impaired sleep related to ***  Current regimen:  ***  ***    Supportive Interventions: { :94900}    Introduction to Supportive and Palliative Oncology:  Spoke with ***   Introduced the role and philosophy of Supportive and Palliative oncology in the evaluation and management of symptoms during cancer treatment  Palliative care was introduced as a service for patients with serious illness to help with symptoms, assist with goals of care conversations, navigate complex decision making, improve quality of life for patients, and provide support both patients and families.  Patient seemed to appreciate the extra layer of support.    Advance Directives  Existence of Advance Directives:{ :07968}  Decision maker: { :82665} ***  Code Status: { :28785}    Next Follow-Up Visit:  Return to clinic in ***    Signature and billing  Thank you for allowing us to participate in the care of this patient.  Recommendations will be communicated back to the consulting service by way of shared electronic medical record or face-to-face.    Medical complexity was {medical complexity:01155} level due to due to complexity of problems, extensive data review, and high risk of management/treatment.  Time was spent on the following: {time spent:75723}. Total time spent: ***      DATA   Diagnostic tests and information reviewed for today's visit:  { :91951}     Plan of Care discussed with: { :01964}      SIGNATURE: LALO Valerio    Contact information:  Supportive and Palliative Oncology  Monday-Friday 8 AM-5 PM  Phone:  [phone], press option #5, then option #1.   Or Epic Secure Chat

## 2024-12-12 DIAGNOSIS — D75.81 MYELOFIBROSIS (MULTI): ICD-10-CM

## 2024-12-12 DIAGNOSIS — Z76.82 STEM CELL TRANSPLANT CANDIDATE: ICD-10-CM

## 2024-12-12 LAB
BB ANTIBODY IDENTIFICATION: NORMAL
CASE #: NORMAL

## 2024-12-13 ENCOUNTER — TELEPHONE (OUTPATIENT)
Dept: HEMATOLOGY/ONCOLOGY | Facility: CLINIC | Age: 64
End: 2024-12-13
Payer: MEDICARE

## 2024-12-13 ENCOUNTER — APPOINTMENT (OUTPATIENT)
Dept: PALLIATIVE MEDICINE | Facility: CLINIC | Age: 64
End: 2024-12-13
Payer: MEDICARE

## 2024-12-13 DIAGNOSIS — Z79.891 ENCOUNTER FOR MONITORING OPIOID MAINTENANCE THERAPY: Primary | ICD-10-CM

## 2024-12-13 DIAGNOSIS — Z51.81 ENCOUNTER FOR MONITORING OPIOID MAINTENANCE THERAPY: Primary | ICD-10-CM

## 2024-12-13 LAB
HLA CLS I TYP PNL BLD/T DONR HIGH RES: NORMAL
HLA RESULTS: NORMAL
HLA-DP2 QL: NORMAL
HLA-DQB1 HIGH RES: NORMAL
HLA-DRB1 HIGH RES: NORMAL

## 2024-12-13 NOTE — TELEPHONE ENCOUNTER
Received message from service that patient called in to cancel appointment for 12/13 at 0900. Return call to patient, when I identified where I was calling from, call was disconnected.  Call to same number again, call unanswered and went to .  Message left stating that patient did not have an appointment today 12/13 that his appointment with Dr. Beebe is 12/16 at 0900 and if he wishes to cancel that appointment to please call the clinic back.

## 2024-12-16 ENCOUNTER — OFFICE VISIT (OUTPATIENT)
Dept: HEMATOLOGY/ONCOLOGY | Facility: CLINIC | Age: 64
End: 2024-12-16
Payer: MEDICARE

## 2024-12-16 ENCOUNTER — INFUSION (OUTPATIENT)
Dept: HEMATOLOGY/ONCOLOGY | Facility: CLINIC | Age: 64
End: 2024-12-16
Payer: MEDICARE

## 2024-12-16 ENCOUNTER — LAB (OUTPATIENT)
Dept: LAB | Facility: CLINIC | Age: 64
End: 2024-12-16
Payer: MEDICARE

## 2024-12-16 ENCOUNTER — SPECIALTY PHARMACY (OUTPATIENT)
Dept: HEMATOLOGY/ONCOLOGY | Facility: CLINIC | Age: 64
End: 2024-12-16

## 2024-12-16 VITALS
OXYGEN SATURATION: 97 % | DIASTOLIC BLOOD PRESSURE: 57 MMHG | WEIGHT: 298.5 LBS | HEART RATE: 104 BPM | RESPIRATION RATE: 18 BRPM | SYSTOLIC BLOOD PRESSURE: 112 MMHG | BODY MASS INDEX: 45.77 KG/M2 | TEMPERATURE: 97.5 F

## 2024-12-16 DIAGNOSIS — K21.9 GASTROESOPHAGEAL REFLUX DISEASE WITHOUT ESOPHAGITIS: ICD-10-CM

## 2024-12-16 DIAGNOSIS — D50.9 IRON DEFICIENCY ANEMIA, UNSPECIFIED IRON DEFICIENCY ANEMIA TYPE: ICD-10-CM

## 2024-12-16 DIAGNOSIS — F17.211 CIGARETTE NICOTINE DEPENDENCE IN REMISSION: ICD-10-CM

## 2024-12-16 DIAGNOSIS — I10 PRIMARY HYPERTENSION: ICD-10-CM

## 2024-12-16 DIAGNOSIS — R91.1 LUNG NODULE: ICD-10-CM

## 2024-12-16 DIAGNOSIS — C34.11 MALIGNANT NEOPLASM OF UPPER LOBE OF RIGHT LUNG (MULTI): ICD-10-CM

## 2024-12-16 DIAGNOSIS — D61.9 ANEMIA DUE TO BONE MARROW FAILURE, UNSPECIFIED BONE MARROW FAILURE TYPE (MULTI): ICD-10-CM

## 2024-12-16 DIAGNOSIS — J43.2 CENTRILOBULAR EMPHYSEMA (MULTI): ICD-10-CM

## 2024-12-16 DIAGNOSIS — D75.81 MYELOFIBROSIS (MULTI): Primary | ICD-10-CM

## 2024-12-16 DIAGNOSIS — D61.818 PANCYTOPENIA: ICD-10-CM

## 2024-12-16 LAB
ALBUMIN SERPL BCP-MCNC: 4.1 G/DL (ref 3.4–5)
ALP SERPL-CCNC: 102 U/L (ref 33–136)
ALT SERPL W P-5'-P-CCNC: 84 U/L (ref 10–52)
ANION GAP SERPL CALC-SCNC: 10 MMOL/L (ref 10–20)
AST SERPL W P-5'-P-CCNC: 43 U/L (ref 9–39)
BASOPHILS # BLD AUTO: 0 X10*3/UL (ref 0–0.1)
BASOPHILS NFR BLD AUTO: 0 %
BILIRUB SERPL-MCNC: 0.6 MG/DL (ref 0–1.2)
BUN SERPL-MCNC: 15 MG/DL (ref 6–23)
CALCIUM SERPL-MCNC: 8.8 MG/DL (ref 8.6–10.3)
CHLORIDE SERPL-SCNC: 102 MMOL/L (ref 98–107)
CO2 SERPL-SCNC: 32 MMOL/L (ref 21–32)
CREAT SERPL-MCNC: 1.11 MG/DL (ref 0.5–1.3)
EGFRCR SERPLBLD CKD-EPI 2021: 74 ML/MIN/1.73M*2
EOSINOPHIL # BLD AUTO: 0 X10*3/UL (ref 0–0.7)
EOSINOPHIL NFR BLD AUTO: 0 %
ERYTHROCYTE [DISTWIDTH] IN BLOOD BY AUTOMATED COUNT: 18.1 % (ref 11.5–14.5)
GLUCOSE SERPL-MCNC: 111 MG/DL (ref 74–99)
HCT VFR BLD AUTO: 20.1 % (ref 41–52)
HGB BLD-MCNC: 6.8 G/DL (ref 13.5–17.5)
HOLD SPECIMEN: NORMAL
IMM GRANULOCYTES # BLD AUTO: 0.02 X10*3/UL (ref 0–0.7)
IMM GRANULOCYTES NFR BLD AUTO: 1.3 % (ref 0–0.9)
LYMPHOCYTES # BLD AUTO: 1.29 X10*3/UL (ref 1.2–4.8)
LYMPHOCYTES NFR BLD AUTO: 83.2 %
MCH RBC QN AUTO: 32.9 PG (ref 26–34)
MCHC RBC AUTO-ENTMCNC: 33.8 G/DL (ref 32–36)
MCV RBC AUTO: 97 FL (ref 80–100)
MONOCYTES # BLD AUTO: 0.1 X10*3/UL (ref 0.1–1)
MONOCYTES NFR BLD AUTO: 6.5 %
NEUTROPHILS # BLD AUTO: 0.14 X10*3/UL (ref 1.2–7.7)
NEUTROPHILS NFR BLD AUTO: 9 %
PLATELET # BLD AUTO: 15 X10*3/UL (ref 150–450)
POTASSIUM SERPL-SCNC: 4.2 MMOL/L (ref 3.5–5.3)
PROT SERPL-MCNC: 7.7 G/DL (ref 6.4–8.2)
RBC # BLD AUTO: 2.07 X10*6/UL (ref 4.5–5.9)
SODIUM SERPL-SCNC: 140 MMOL/L (ref 136–145)
WBC # BLD AUTO: 1.6 X10*3/UL (ref 4.4–11.3)

## 2024-12-16 PROCEDURE — 1126F AMNT PAIN NOTED NONE PRSNT: CPT | Performed by: INTERNAL MEDICINE

## 2024-12-16 PROCEDURE — 99214 OFFICE O/P EST MOD 30 MIN: CPT | Performed by: INTERNAL MEDICINE

## 2024-12-16 PROCEDURE — G2211 COMPLEX E/M VISIT ADD ON: HCPCS | Performed by: INTERNAL MEDICINE

## 2024-12-16 PROCEDURE — 3074F SYST BP LT 130 MM HG: CPT | Performed by: INTERNAL MEDICINE

## 2024-12-16 PROCEDURE — 85025 COMPLETE CBC W/AUTO DIFF WBC: CPT

## 2024-12-16 PROCEDURE — 1111F DSCHRG MED/CURRENT MED MERGE: CPT | Performed by: INTERNAL MEDICINE

## 2024-12-16 PROCEDURE — 3078F DIAST BP <80 MM HG: CPT | Performed by: INTERNAL MEDICINE

## 2024-12-16 PROCEDURE — 36591 DRAW BLOOD OFF VENOUS DEVICE: CPT

## 2024-12-16 PROCEDURE — 80053 COMPREHEN METABOLIC PANEL: CPT

## 2024-12-16 PROCEDURE — 1123F ACP DISCUSS/DSCN MKR DOCD: CPT | Performed by: INTERNAL MEDICINE

## 2024-12-16 PROCEDURE — 1159F MED LIST DOCD IN RCRD: CPT | Performed by: INTERNAL MEDICINE

## 2024-12-16 PROCEDURE — 36415 COLL VENOUS BLD VENIPUNCTURE: CPT

## 2024-12-16 PROCEDURE — 86880 COOMBS TEST DIRECT: CPT

## 2024-12-16 RX ORDER — DIPHENHYDRAMINE HYDROCHLORIDE 50 MG/ML
50 INJECTION INTRAMUSCULAR; INTRAVENOUS AS NEEDED
Status: CANCELLED | OUTPATIENT
Start: 2024-12-16

## 2024-12-16 RX ORDER — ALBUTEROL SULFATE 0.83 MG/ML
3 SOLUTION RESPIRATORY (INHALATION) AS NEEDED
Status: CANCELLED | OUTPATIENT
Start: 2024-12-16

## 2024-12-16 RX ORDER — FAMOTIDINE 10 MG/ML
20 INJECTION INTRAVENOUS ONCE AS NEEDED
Status: CANCELLED | OUTPATIENT
Start: 2024-12-16

## 2024-12-16 RX ORDER — EPINEPHRINE 0.3 MG/.3ML
0.3 INJECTION SUBCUTANEOUS EVERY 5 MIN PRN
Status: CANCELLED | OUTPATIENT
Start: 2024-12-16

## 2024-12-16 ASSESSMENT — PAIN SCALES - GENERAL: PAINLEVEL_OUTOF10: 0-NO PAIN

## 2024-12-16 NOTE — PROGRESS NOTES
Patient ID: Jaycee Harp is a 64 y.o. male.  Referring Physician: No referring provider defined for this encounter.  Primary Care Provider: Mandeep Tucker,   Visit Type: Follow Up      Subjective    HPI How are my blood counts?    Review of Systems   Constitutional:  Positive for fatigue.   HENT:  Negative.     Eyes: Negative.    Respiratory:  Positive for shortness of breath.    Cardiovascular: Negative.    Gastrointestinal: Negative.    Endocrine: Negative.    Genitourinary: Negative.     Musculoskeletal: Negative.    Skin: Negative.    Neurological: Negative.    Hematological:  Bruises/bleeds easily.   Psychiatric/Behavioral:  Positive for depression. The patient is nervous/anxious.         Objective   BSA: 2.54 meters squared  /57 (BP Location: Right arm)   Pulse 104   Temp 36.4 °C (97.5 °F) (Temporal)   Resp 18   Wt 135 kg (298 lb 8.1 oz)   SpO2 97% Comment: Pt is on 3L of O2  BMI 45.77 kg/m²      has a past medical history of Contact with and (suspected) exposure to covid-19 (08/28/2020), Hematuria (10/12/2023), Personal history of other endocrine, nutritional and metabolic disease (01/13/2021), and Personal history of other specified conditions (08/28/2020).   has a past surgical history that includes Other surgical history (10/31/2019).  Family History   Problem Relation Name Age of Onset    Diabetes Mother      Other (varicose veins) Mother      Liver cancer Mother      Diabetes Father      Tuberculosis Father       Oncology History   Myelofibrosis (Multi)   11/28/2024 Initial Diagnosis    Myeloproliferative Neoplasm - favor Primary Myelofibrosis  Diagnosis:     BONE MARROW CLOT WITH ASPIRATE AND CORE WITH TOUCH PREP, LEFT ILIAC CREST:    --HYPERCELLULAR BONE MARROW (95%) WITH ATYPICAL MEGAKARYOCYTES AND INCREASED FIBROSIS CONSISTENT WITH MYELOPROLIFERATIVE NEOPLASM, SEE NOTE.    NOTE: Sections show increased and morphologically atypical megakaryocytes frequently arranged in clusters in a  slightly hypercellular bone marrow. Blasts are not increased by morphology or CD34 immunohistochemical stain. There is increaased reticulin fibrosis (MF grade 2). There is not definitive morphologic evidence of dysplasia. 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 The overall findings are most consistent with a myeloproliferative neoplasm, favor primary myelofibrosis. Chromosomal analysis and FISH studies will be attempted and results will be reported separately. Correlation with clinical findings is recommended         Jaycee Harp  reports that he quit smoking about 53 years ago. His smoking use included cigarettes. He started smoking about 2 months ago. He has been exposed to tobacco smoke. He has quit using smokeless tobacco.  He  reports that he does not currently use alcohol after a past usage of about 24.0 standard drinks of alcohol per week.  He  reports current drug use. Drug: Marijuana.    Physical Exam  Vitals reviewed.   Constitutional:       Appearance: Normal appearance.   HENT:      Head: Normocephalic.      Mouth/Throat:      Mouth: Mucous membranes are moist.   Eyes:      Extraocular Movements: Extraocular movements intact.      Pupils: Pupils are equal, round, and reactive to light.   Cardiovascular:      Rate and Rhythm: Normal rate and regular rhythm.      Pulses: Normal pulses.      Heart sounds: Normal heart sounds.   Pulmonary:      Effort: Pulmonary effort is normal.      Breath sounds: Normal breath sounds.   Abdominal:      General: Bowel sounds are normal.      Palpations: Abdomen is soft.   Musculoskeletal:         General: Normal range of motion.      Cervical back: Normal range of motion and neck supple.   Skin:     General: Skin is warm.   Neurological:      General: No focal deficit present.      Mental Status: He is alert and oriented to person, place, and time.    Psychiatric:         Mood and Affect: Mood normal.         Behavior: Behavior normal.         WBC   Date/Time Value Ref Range Status   12/09/2024 08:52 AM 1.9 (L) 4.4 - 11.3 x10*3/uL Final   12/05/2024 04:27 PM 2.1 (L) 4.4 - 11.3 x10*3/uL Final   12/02/2024 09:06 AM 1.4 (L) 4.4 - 11.3 x10*3/uL Final     nRBC   Date Value Ref Range Status   11/25/2024   Final     Comment:     Not Measured   11/20/2024   Final     Comment:     Not Measured   11/16/2024 4.4 (H) 0.0 - 0.0 /100 WBCs Final     RBC   Date Value Ref Range Status   12/09/2024 2.08 (L) 4.50 - 5.90 x10*6/uL Final   12/05/2024 2.04 (L) 4.50 - 5.90 x10*6/uL Final   12/02/2024 1.88 (L) 4.50 - 5.90 x10*6/uL Final     POC Hemoglobin   Date Value Ref Range Status   11/22/2024 6.3 (A) 13.5 - 17.5 g/dL Final     Hemoglobin   Date Value Ref Range Status   12/09/2024 6.8 (L) 13.5 - 17.5 g/dL Final   12/05/2024 6.8 (L) 13.5 - 17.5 g/dL Final   12/02/2024 6.6 (L) 13.5 - 17.5 g/dL Final     Hematocrit   Date Value Ref Range Status   12/09/2024 20.6 (L) 41.0 - 52.0 % Final   12/05/2024 20.0 (L) 41.0 - 52.0 % Final   12/02/2024 19.0 (L) 41.0 - 52.0 % Final     MCV   Date/Time Value Ref Range Status   12/09/2024 08:52 AM 99 80 - 100 fL Final   12/05/2024 04:27 PM 98 80 - 100 fL Final   12/02/2024 09:06  (H) 80 - 100 fL Final     MCH   Date/Time Value Ref Range Status   12/09/2024 08:52 AM 32.7 26.0 - 34.0 pg Final   12/05/2024 04:27 PM 33.3 26.0 - 34.0 pg Final   12/02/2024 09:06 AM 34.6 (H) 26.0 - 34.0 pg Final     MCHC   Date/Time Value Ref Range Status   12/09/2024 08:52 AM 33.0 32.0 - 36.0 g/dL Final   12/05/2024 04:27 PM 34.0 32.0 - 36.0 g/dL Final   12/02/2024 09:06 AM 34.2 32.0 - 36.0 g/dL Final     RDW   Date/Time Value Ref Range Status   12/09/2024 08:52 AM 18.5 (H) 11.5 - 14.5 % Final   12/05/2024 04:27 PM 18.9 (H) 11.5 - 14.5 % Final   12/02/2024 09:06 AM 17.6 (H) 11.5 - 14.5 % Final     Platelets   Date/Time Value Ref Range Status   12/09/2024 08:52 AM 13  "(LL) 150 - 450 x10*3/uL Final   12/05/2024 04:27 PM 18 (LL) 150 - 450 x10*3/uL Final   12/02/2024 09:06 AM 19 (LL) 150 - 450 x10*3/uL Final     No results found for: \"MPV\"  Neutrophils %   Date/Time Value Ref Range Status   12/09/2024 08:52 AM 9.7 40.0 - 80.0 % Final   12/05/2024 04:27 PM 9.2 40.0 - 80.0 % Final   12/02/2024 09:06 AM 14.1 40.0 - 80.0 % Final     Immature Granulocytes %, Automated   Date/Time Value Ref Range Status   12/09/2024 08:52 AM 0.5 0.0 - 0.9 % Final     Comment:     Immature Granulocyte Count (IG) includes promyelocytes, myelocytes and metamyelocytes but does not include bands. Percent differential counts (%) should be interpreted in the context of the absolute cell counts (cells/UL).   12/05/2024 04:27 PM 0.0 0.0 - 0.9 % Final     Comment:     Immature Granulocyte Count (IG) includes promyelocytes, myelocytes and metamyelocytes but does not include bands. Percent differential counts (%) should be interpreted in the context of the absolute cell counts (cells/UL).   12/02/2024 09:06 AM 0.7 0.0 - 0.9 % Final     Comment:     Immature Granulocyte Count (IG) includes promyelocytes, myelocytes and metamyelocytes but does not include bands. Percent differential counts (%) should be interpreted in the context of the absolute cell counts (cells/UL).     Lymphocytes %, Manual   Date/Time Value Ref Range Status   11/14/2024 02:58 PM 76.0 13.0 - 44.0 % Final   10/11/2024 09:59 AM 70.0 13.0 - 44.0 % Final     Lymphocytes %   Date/Time Value Ref Range Status   12/09/2024 08:52 AM 83.9 13.0 - 44.0 % Final   12/05/2024 04:27 PM 87.0 13.0 - 44.0 % Final   12/02/2024 09:06 AM 76.8 13.0 - 44.0 % Final     Monocytes %, Manual   Date/Time Value Ref Range Status   11/14/2024 02:58 PM 5.0 2.0 - 10.0 % Final   10/11/2024 09:59 AM 3.0 2.0 - 10.0 % Final     Monocytes %   Date/Time Value Ref Range Status   12/09/2024 08:52 AM 5.4 2.0 - 10.0 % Final   12/05/2024 04:27 PM 3.8 2.0 - 10.0 % Final   12/02/2024 09:06 AM " 7.0 2.0 - 10.0 % Final     Eosinophils %, Manual   Date/Time Value Ref Range Status   11/14/2024 02:58 PM 2.0 0.0 - 6.0 % Final   10/11/2024 09:59 AM 2.0 0.0 - 6.0 % Final     Eosinophils %   Date/Time Value Ref Range Status   12/09/2024 08:52 AM 0.5 0.0 - 6.0 % Final   12/05/2024 04:27 PM 0.0 0.0 - 6.0 % Final   12/02/2024 09:06 AM 0.7 0.0 - 6.0 % Final     Basophils %, Manual   Date/Time Value Ref Range Status   11/14/2024 02:58 PM 0.0 0.0 - 2.0 % Final   10/11/2024 09:59 AM 0.0 0.0 - 2.0 % Final     Basophils %   Date/Time Value Ref Range Status   12/09/2024 08:52 AM 0.0 0.0 - 2.0 % Final   12/05/2024 04:27 PM 0.0 0.0 - 2.0 % Final   12/02/2024 09:06 AM 0.7 0.0 - 2.0 % Final     Neutrophils Absolute   Date/Time Value Ref Range Status   12/09/2024 08:52 AM 0.18 (L) 1.20 - 7.70 x10*3/uL Final     Comment:     Percent differential counts (%) should be interpreted in the context of the absolute cell counts (cells/uL).   12/05/2024 04:27 PM 0.19 (L) 1.20 - 7.70 x10*3/uL Final     Comment:     Percent differential counts (%) should be interpreted in the context of the absolute cell counts (cells/uL).   12/02/2024 09:06 AM 0.20 (L) 1.20 - 7.70 x10*3/uL Final     Comment:     Percent differential counts (%) should be interpreted in the context of the absolute cell counts (cells/uL).     Immature Granulocytes Absolute, Automated   Date/Time Value Ref Range Status   12/09/2024 08:52 AM 0.01 0.00 - 0.70 x10*3/uL Final   12/05/2024 04:27 PM 0.00 0.00 - 0.70 x10*3/uL Final   12/02/2024 09:06 AM 0.01 0.00 - 0.70 x10*3/uL Final     Lymphocytes Absolute   Date/Time Value Ref Range Status   12/09/2024 08:52 AM 1.56 1.20 - 4.80 x10*3/uL Final   12/05/2024 04:27 PM 1.81 1.20 - 4.80 x10*3/uL Final   12/02/2024 09:06 AM 1.09 (L) 1.20 - 4.80 x10*3/uL Final     Monocytes Absolute   Date/Time Value Ref Range Status   12/09/2024 08:52 AM 0.10 0.10 - 1.00 x10*3/uL Final   12/05/2024 04:27 PM 0.08 (L) 0.10 - 1.00 x10*3/uL Final  "  2024 09:06 AM 0.10 0.10 - 1.00 x10*3/uL Final     Eosinophils Absolute   Date/Time Value Ref Range Status   2024 08:52 AM 0.01 0.00 - 0.70 x10*3/uL Final   2024 04:27 PM 0.00 0.00 - 0.70 x10*3/uL Final   2024 09:06 AM 0.01 0.00 - 0.70 x10*3/uL Final     Eosinophils Absolute, Manual   Date/Time Value Ref Range Status   2024 02:58 PM 0.04 0.00 - 0.70 x10*3/uL Final   10/11/2024 09:59 AM 0.04 0.00 - 0.70 x10*3/uL Final     Basophils Absolute   Date/Time Value Ref Range Status   2024 08:52 AM 0.00 0.00 - 0.10 x10*3/uL Final   2024 04:27 PM 0.00 0.00 - 0.10 x10*3/uL Final   2024 09:06 AM 0.01 0.00 - 0.10 x10*3/uL Final     Basophils Absolute, Manual   Date/Time Value Ref Range Status   2024 02:58 PM 0.00 0.00 - 0.10 x10*3/uL Final   10/11/2024 09:59 AM 0.00 0.00 - 0.10 x10*3/uL Final       No components found for: \"PT\"  aPTT   Date/Time Value Ref Range Status   11/15/2024 12:05 AM 25 (L) 27 - 38 seconds Final   09/15/2021 04:30 PM 35 25 - 35 sec Final     Comment:       THE APTT IS NO LONGER USED FOR MONITORING     UNFRACTIONATED HEPARIN THERAPY.    FOR MONITORING HEPARIN THERAPY,     USE THE HEPARIN ASSAY.       Medication Documentation Review Audit       Reviewed by Alexandra Mckeon MA (Medical Assistant) on 24 at 0843      Medication Order Taking? Sig Documenting Provider Last Dose Status   albuterol (ProAir HFA) 90 mcg/actuation inhaler [id number] Yes Inhale 2 puffs every 4 hours if needed for wheezing or shortness of breath. Mandeep Tucker, DO 2024 Active   budesonide-glycopyr-formoterol (Breztri Aerosphere) 160-9-4.8 mcg/actuation HFA aerosol inhaler [id number] Yes Inhale 2 puffs 2 times a day. Rinse mouth after. Historical Provider, MD  Active   hydrOXYzine pamoate (VistariL) 25 mg capsule [id number]  Take 1 capsule (25 mg) by mouth every 6 hours if needed for itching or anxiety for up to 10 days. Mandeep Tucker,    24 1849 "   losartan-hydrochlorothiazide (Hyzaar) 50-12.5 mg tablet [id number] Yes TAKE 1 TABLET BY MOUTH EVERY DAY Mandeep Tucker, DO 2024 Active   momelotinib (Ojjaara) 100 mg tablet tablet [id number] Yes Take 1 tablet (100 mg) by mouth once daily. Raul Beebe MD  Active   omeprazole (PriLOSEC) 20 mg DR capsule [id number] Yes Take 1 capsule (20 mg) by mouth 2 times a day. Do not crush or chew. Mandeep Tucker, DO 2024 Active   POTASSIUM CHLORIDE ORAL [id number] Yes Take by mouth. Historical Provider, MD  Active   tadalafil (Cialis) 20 mg tablet [id number]  Take 1 tablet (20 mg) by mouth once daily as needed for erectile dysfunction. Shea Patel, APRN-CNP   24 235   varenicline (Chantix) 0.5 mg tablet [id number] Yes Take 1 tablet (0.5 mg) by mouth 2 times a day. Take with full glass of water. Historical Provider, MD 2024 Active                   Assessment/Plan    1) pancytopenia  -I reviewed his lab history in the EMR  -2021 wbc 7.2, hgb 16.2, plt 313,000  -9/15/2021 wbc 8.6, hgb 14.4, plt 242,000  -2023 wbc 11.2, hgb 13.9, plt 248,000  -10/11/2024 wbc 1.8, hgb 9.3, , plt 37,000, , abs lymph 1260 (smear: mild polychromasia, few ovalocytes, few teardrops, basophilic stippling)  -he is fatigued because of the anemia  -he was incarcerated for a number of years, and ended up with fungal infections in his toenails, which probably have nothing to do with his current pancytopenia, which is a fairly new development  -he may have MDS, or hairy cell leukemia, or even AML  -I have recommended proceeding with a bone marrow biopsy as soon as possible  -bmbx done on 2024  reviewed--hypercellular bone marrow (95%) with atypical megakaryocytes and increased fibrosis consistent with myeloproliferative neoplasm; blasts are not increased by morphology or CD34 IHC; there is increased reticulin fibrosis (MF grade 2); molecular studies showed disease associated mutations in  DNMT3A and U2AF1 however, no pathogenic mutations in JAK2, MPL or CALR. 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  -case was reviewed in malignant heme TB on 11/14/2024--panel consensus was to start with momelotinib and then proceed with transplant evaluation  -he was admitted to [facility] a few days ago--was transfused PRBC  -today we will recheck CBC and then set him up for weekly lab checks--will transfuse if hgb <=7, and platelets <10K  -he saw Dr Massey on 12/5/2024--he advised to continue with momelotinib, weekly lab checks and to begin evaluation for transplant (he will see Dr Vargas in January)  -he is awaiting appointment with our psych NP Osvaldo Roper  -he continues to take momelotinib 100 mg PO daily  -11/8/2024  CBC shows wbc 1.8, hgb 8.1, plt 14,000,   -11/25/2024 wbc 1.8, hgb 7.6, plt 15,000,   -12/2/2024 wbc 1.4, hgb 6.6, plt 19,000   -12/5/2024 wbc 2.1, hgb 6.8, plt 18,000,   -12/9/2024 wbc 1.9, hgb 6.8, plt 13,000,   -12/16/2024 wbc 1.6, hgb 6.8, plt 15,000, , creatinine 1.11, alk phos 102, AST 43, ALT 84  -will consider antimicrobial prophylaxis if ANC drops to <200  -will continue with transfusion support PRN  -he will see Dr Massey again on 1/2/2025  -he will see Dr Vargas on 1/10/2025    1/15/2025 ** Jaycee has been diagnosed with myelofibrosis undergoing treatment and utilizes home oxygen. He is experiencing weakness, fatigue, and shortness of breath. He would benefit from the use of a wheelchair in the home. A cane or a walker would not provide enough support and would be unsafe for him at this time. He would benefit from the use of a wheelchair to increase his activity in the home. He will be able to use this safely with the assistance of his family.     2) COPD/emphysema  -on proair  -he is not actually dependent on oxygen--he only started using oxygen PRN when he became anemic     3) hypertension  -on losartan-hydrochlorothiazide      4) high BMI  -on phentermine  -on wegovy     5) tobacco abuse  -on chantix     6) lung nodule  -LDCT done on 10/3/2024 showed a 1.7 cm pleural based nodule in the RUL   -PET scan done on 10/7/2024 showed FDG avid pleural based right upper lobe nodule with SUV 4.9; there is mild hypermetabolic activity along the periphery paraseptal emphysematous changes within left lower lobe with SUV 2.9; no FDG avid mediastinal, hilar or axillary lymphadenopathy  -he has seen thoracic surgeon Dr Garcia, who recommended either resection vs SBRT  -this lesion looks atypical for malignancy--could be inflammatory  -because of his marked thrombocytopenia--biopsy cannot be done right now     Problem List Items Addressed This Visit    None           Raul Beebe MD

## 2024-12-17 ENCOUNTER — DOCUMENTATION (OUTPATIENT)
Dept: OTHER | Facility: HOSPITAL | Age: 64
End: 2024-12-17
Payer: MEDICARE

## 2024-12-17 PROBLEM — Z76.82 STEM CELL TRANSPLANT CANDIDATE: Status: ACTIVE | Noted: 2024-12-17

## 2024-12-17 LAB
ABO GROUP (TYPE) IN BLOOD: NORMAL
ANTIBODY SCREEN: NORMAL
DAT-POLYSPECIFIC: NORMAL
RH FACTOR (ANTIGEN D): NORMAL

## 2024-12-17 RX ORDER — EPINEPHRINE 0.3 MG/.3ML
0.3 INJECTION SUBCUTANEOUS EVERY 5 MIN PRN
Status: CANCELLED | OUTPATIENT
Start: 2024-12-17

## 2024-12-17 RX ORDER — DIPHENHYDRAMINE HYDROCHLORIDE 50 MG/ML
50 INJECTION INTRAMUSCULAR; INTRAVENOUS AS NEEDED
Status: CANCELLED | OUTPATIENT
Start: 2024-12-17

## 2024-12-17 RX ORDER — ALBUTEROL SULFATE 0.83 MG/ML
3 SOLUTION RESPIRATORY (INHALATION) AS NEEDED
Status: CANCELLED | OUTPATIENT
Start: 2024-12-17

## 2024-12-17 RX ORDER — FAMOTIDINE 10 MG/ML
20 INJECTION INTRAVENOUS ONCE AS NEEDED
Status: CANCELLED | OUTPATIENT
Start: 2024-12-17

## 2024-12-17 NOTE — PROGRESS NOTES
Called pt and introduced self as SCT coordinator. Explained 1/10 appointment with Dr. Vargas will be an introduction to transplant. Pt states he has older siblings but they are in poor health. He will provide contact information for 2 children. I will mail pt transplant information and HLA family typing form as requested by pt. My contact information provided.

## 2024-12-17 NOTE — ASSESSMENT & PLAN NOTE
12/6/2024: Mr. Harp is here to discuss his high risk myeloproliferative neoplasm, with a diagnosis favoring primary myelofibrosis, although this would be a triple negative myelofibrosis in the absence of JAK2, MPL, VICKEY R, and if the clinical behavior shifts it may be reasonable to consider alternative diagnoses such as MDS/MPN NOS, however at this time he is is primarily manifested by low blood counts, including transfusion dependence.    Given the severity of his cytopenias, this represents high risk disease.  I reviewed with mr. Harp treatment options, given the severity of his thrombocytopenia and transfusion dependent anemia some form of intervention to try to improve his blood counts prove his quality of life is very appropriate.  Momelotinib is a option that may be able to improve his blood counts, if this is not effective we could consider alternative options such as growth factors.  In the absence of increased blasts, we typically do not consider using hypomethylating agents for myelofibrosis.    However concerned about his overall prognosis without being able to receive an allogenic stem cell transplant.  We reviewed that I would like to have him fully evaluated for stem cell transplant, but that there may be significant barriers, including his comorbid conditions.  Most concerned about a potential active lung nodule that is currently has deferred workup and the severity of his low blood counts.  I do think that if a biopsy is indicated we could be completed with platelet transfusion to mitigate the risk of bleeding, I am also concerned that there could be significant social barriers to considering transplant.  Nevertheless I recommend we refer to the program consider an initial donor search to see what options are out there.  By both MIPSS70 and MIPSS70 2.0 he is high and very high risk disease.     I believe he will need to continue to have very close follow-up of his blood counts for supportive care  reasons, and to monitor for effects of momelotinib.   Diagnostics:  - none further   Treatment:  - continue momelotinib as prescribed by Dr. Beebe - has only just started   Disease/Toxicity Monitoring:  - CBC weekly   Supportive Care:  - Weekly CBC for transfusion dependence   Antimicrobial Prophylaxis:   - We did not discuss today, but likely add acyclovir   IV access:  - PIV as needed - may consider port

## 2024-12-17 NOTE — ASSESSMENT & PLAN NOTE
Based on the severity of his myelofibrosis, workup for stem cell transplant is probably the most appropriate step - however, I am concern that he may have significant barriers to successful transplant, including his comorbid conditions, as well as potential social barriers.  However, I recommended we refer to the program and initiate donor search.  Organ function testing and social work evaluation can fully evaluate his potential risks and benefits.     I reviewed the basic concept and curative potential of hemopoietic stem cell transplant, but emphasized that decisions to consider transplant are based around risk::benefit, and if the risk of adverse outcome is substantially high, we often recommend against or will not pursue an allogeneic stem cell transplant.   Plan:  - HLA typing  - HLA class I and II ab profile   - alloviral panel   - referral to the alloSCT for evaluation

## 2024-12-17 NOTE — ASSESSMENT & PLAN NOTE
12/5/2024: seen by Dr. Rodriguez in Thoracic Surgery who recommended resection if feasible, but severe thrombocytopenia is likely a significant barrier to being able to receive this

## 2024-12-17 NOTE — ASSESSMENT & PLAN NOTE
>>ASSESSMENT AND PLAN FOR LUNG NODULE WRITTEN ON 12/17/2024 12:41 PM BY GLADYS RODRIGEZ MD    12/5/2024: seen by Dr. Rodriguez in Thoracic Surgery who recommended resection if feasible, but severe thrombocytopenia is likely a significant barrier to being able to receive this

## 2024-12-18 ENCOUNTER — INFUSION (OUTPATIENT)
Dept: HEMATOLOGY/ONCOLOGY | Facility: CLINIC | Age: 64
End: 2024-12-18
Payer: MEDICARE

## 2024-12-18 ENCOUNTER — SOCIAL WORK (OUTPATIENT)
Dept: HEMATOLOGY/ONCOLOGY | Facility: CLINIC | Age: 64
End: 2024-12-18

## 2024-12-18 VITALS
SYSTOLIC BLOOD PRESSURE: 121 MMHG | BODY MASS INDEX: 45.83 KG/M2 | OXYGEN SATURATION: 97 % | DIASTOLIC BLOOD PRESSURE: 70 MMHG | WEIGHT: 298.94 LBS | TEMPERATURE: 98.4 F | HEART RATE: 78 BPM | RESPIRATION RATE: 16 BRPM

## 2024-12-18 DIAGNOSIS — D61.818 PANCYTOPENIA: ICD-10-CM

## 2024-12-18 DIAGNOSIS — D61.9 ANEMIA DUE TO BONE MARROW FAILURE, UNSPECIFIED BONE MARROW FAILURE TYPE (MULTI): ICD-10-CM

## 2024-12-18 DIAGNOSIS — D50.9 IRON DEFICIENCY ANEMIA, UNSPECIFIED IRON DEFICIENCY ANEMIA TYPE: ICD-10-CM

## 2024-12-18 LAB
BLOOD EXPIRATION DATE: NORMAL
DISPENSE STATUS: NORMAL
PRODUCT BLOOD TYPE: 5100
PRODUCT CODE: NORMAL
UNIT ABO: NORMAL
UNIT NUMBER: NORMAL
UNIT RH: NORMAL
UNIT VOLUME: 350
XM INTEP: NORMAL

## 2024-12-18 PROCEDURE — P9040 RBC LEUKOREDUCED IRRADIATED: HCPCS

## 2024-12-18 PROCEDURE — 86880 COOMBS TEST DIRECT: CPT

## 2024-12-18 PROCEDURE — 86900 BLOOD TYPING SEROLOGIC ABO: CPT

## 2024-12-18 PROCEDURE — 36430 TRANSFUSION BLD/BLD COMPNT: CPT

## 2024-12-18 PROCEDURE — 86870 RBC ANTIBODY IDENTIFICATION: CPT

## 2024-12-18 PROCEDURE — 86922 COMPATIBILITY TEST ANTIGLOB: CPT

## 2024-12-18 PROCEDURE — 86978 RBC PRETREATMENT SERUM: CPT

## 2024-12-18 PROCEDURE — 86902 BLOOD TYPE ANTIGEN DONOR EA: CPT

## 2024-12-18 PROCEDURE — 86901 BLOOD TYPING SEROLOGIC RH(D): CPT

## 2024-12-18 PROCEDURE — 86860 RBC ANTIBODY ELUTION: CPT

## 2024-12-18 RX ORDER — HEPARIN SODIUM,PORCINE/PF 10 UNIT/ML
50 SYRINGE (ML) INTRAVENOUS AS NEEDED
Status: CANCELLED | OUTPATIENT
Start: 2024-12-18

## 2024-12-18 RX ORDER — HEPARIN 100 UNIT/ML
500 SYRINGE INTRAVENOUS AS NEEDED
Status: CANCELLED | OUTPATIENT
Start: 2024-12-18

## 2024-12-18 RX ORDER — DIPHENHYDRAMINE HYDROCHLORIDE 50 MG/ML
50 INJECTION INTRAMUSCULAR; INTRAVENOUS AS NEEDED
OUTPATIENT
Start: 2024-12-18

## 2024-12-18 RX ORDER — EPINEPHRINE 0.3 MG/.3ML
0.3 INJECTION SUBCUTANEOUS EVERY 5 MIN PRN
OUTPATIENT
Start: 2024-12-18

## 2024-12-18 RX ORDER — ALBUTEROL SULFATE 0.83 MG/ML
3 SOLUTION RESPIRATORY (INHALATION) AS NEEDED
OUTPATIENT
Start: 2024-12-18

## 2024-12-18 RX ORDER — FAMOTIDINE 10 MG/ML
20 INJECTION INTRAVENOUS ONCE AS NEEDED
OUTPATIENT
Start: 2024-12-18

## 2024-12-18 RX ORDER — HEPARIN SODIUM,PORCINE/PF 10 UNIT/ML
50 SYRINGE (ML) INTRAVENOUS AS NEEDED
OUTPATIENT
Start: 2024-12-18

## 2024-12-18 RX ORDER — HEPARIN 100 UNIT/ML
500 SYRINGE INTRAVENOUS AS NEEDED
OUTPATIENT
Start: 2024-12-18

## 2024-12-18 ASSESSMENT — PAIN SCALES - GENERAL: PAINLEVEL_OUTOF10: 0-NO PAIN

## 2024-12-19 LAB
BB ANTIBODY IDENTIFICATION: NORMAL
CASE #: NORMAL
DAT-COMPLEMENT: NORMAL
DAT-IGG: ABNORMAL

## 2024-12-20 ENCOUNTER — APPOINTMENT (OUTPATIENT)
Dept: PRIMARY CARE | Facility: CLINIC | Age: 64
End: 2024-12-20
Payer: MEDICARE

## 2024-12-20 VITALS — SYSTOLIC BLOOD PRESSURE: 122 MMHG | DIASTOLIC BLOOD PRESSURE: 74 MMHG

## 2024-12-20 DIAGNOSIS — S06.6XAD: ICD-10-CM

## 2024-12-20 DIAGNOSIS — F31.5 BIPOLAR DISORDER, CURRENT EPISODE DEPRESSED, SEVERE, WITH PSYCHOTIC FEATURES (MULTI): Primary | ICD-10-CM

## 2024-12-20 DIAGNOSIS — S06.6X0S: ICD-10-CM

## 2024-12-20 DIAGNOSIS — S06.6X0D: ICD-10-CM

## 2024-12-20 PROCEDURE — 99442 PR PHYS/QHP TELEPHONE EVALUATION 11-20 MIN: CPT | Performed by: FAMILY MEDICINE

## 2024-12-20 ASSESSMENT — ENCOUNTER SYMPTOMS
SPEECH DIFFICULTY: 0
SHORTNESS OF BREATH: 0
ABDOMINAL PAIN: 0
STRIDOR: 0
DYSURIA: 0
DECREASED CONCENTRATION: 0
SLEEP DISTURBANCE: 0
BLOOD IN STOOL: 0
NECK STIFFNESS: 0
ACTIVITY CHANGE: 0
DIARRHEA: 0
POLYDIPSIA: 0
ABDOMINAL DISTENTION: 0
ADENOPATHY: 0
SEIZURES: 0
SORE THROAT: 0
MYALGIAS: 0
APPETITE CHANGE: 0
AGITATION: 0
PHOTOPHOBIA: 0
RHINORRHEA: 0
RECTAL PAIN: 0
COUGH: 0
DIZZINESS: 0
NERVOUS/ANXIOUS: 0
CONSTIPATION: 0
HEADACHES: 0
SINUS PRESSURE: 0
CHEST TIGHTNESS: 0
FLANK PAIN: 0
TROUBLE SWALLOWING: 0
DYSPHORIC MOOD: 0
FEVER: 0
SINUS PAIN: 0
EYE PAIN: 0
PALPITATIONS: 0
COLOR CHANGE: 0
CONFUSION: 0
HEMATURIA: 0
POLYPHAGIA: 0
ARTHRALGIAS: 0

## 2024-12-20 NOTE — PROGRESS NOTES
Subjective   Patient ID: Jaycee Harp is a 64 y.o. male who presents for Follow-up.    HPI  Patient wants to discuss his treatment plan he is receiving from [facility].     Review of Systems   Constitutional:  Positive for fatigue. Negative for activity change, appetite change and fever.   HENT:  Negative for congestion, dental problem, ear discharge, ear pain, mouth sores, rhinorrhea, sinus pressure, sinus pain, sore throat, tinnitus and trouble swallowing.    Eyes:  Negative for photophobia, pain and visual disturbance.   Respiratory:  Negative for cough, chest tightness, shortness of breath and stridor.    Cardiovascular:  Negative for chest pain and palpitations.   Gastrointestinal:  Negative for abdominal distention, abdominal pain, blood in stool, constipation, diarrhea and rectal pain.   Endocrine: Negative for cold intolerance, heat intolerance, polydipsia, polyphagia and polyuria.   Genitourinary:  Negative for dysuria, flank pain, hematuria and urgency.   Musculoskeletal:  Negative for arthralgias, gait problem, myalgias and neck stiffness.   Skin:  Negative for color change and rash.   Allergic/Immunologic: Negative for environmental allergies and food allergies.   Neurological:  Negative for dizziness, seizures, syncope, speech difficulty and headaches.   Hematological:  Negative for adenopathy.   Psychiatric/Behavioral:  Negative for agitation, confusion, decreased concentration, dysphoric mood and sleep disturbance. The patient is not nervous/anxious.        Objective   /74     Physical Exam  Neurological:      Mental Status: He is alert and oriented to person, place, and time.   Psychiatric:         Mood and Affect: Mood normal.         Behavior: Behavior normal.         Thought Content: Thought content normal.       Constitutional: Well developed, well nourished, alert and in no acute distress   Psychiatric: Mood calm and affect normal    Telephone Visit - Audio Communication Only      Assessment/Plan   Problem List Items Addressed This Visit             ICD-10-CM    Traumatic intracranial subarachnoid hemorrhage, without loss of consciousness, sequela (CMS-HCC) S06.6X0S     Improving/monitored         Bipolar disorder, current episode depressed, severe, with psychotic features (Multi) - Primary F31.5     Stable/monitored         Traumatic intracranial subarachnoid hemorrhage with loss of consciousness, subsequent encounter S06.6X0D     Improving/stable          Consent obtained by Jaycee Harp for audio communication. Total time for this audio communication visit is 12 minutes.      Provider Attestation - Scribe documentation    All medical record entries made by the Scribe were at my direction and personally dictated by me. I have reviewed the chart and agree that the record accurately reflects my personal performance of the history, physical exam, discussion and plan.

## 2024-12-20 NOTE — PATIENT INSTRUCTIONS
Follow up in 6 weeks    Continue current medications and therapy for chronic medical conditions.    Patient was advised importance of proper diet/nutrition in addition adequate hydration. Patient was encouraged moderate exercise program to include 30 minutes daily for 5 days of the week or 150 minutes weekly. Patient will follow-up with us as scheduled.    Review lab results from December 2024

## 2024-12-21 ENCOUNTER — SPECIALTY PHARMACY (OUTPATIENT)
Dept: PHARMACY | Facility: CLINIC | Age: 64
End: 2024-12-21

## 2024-12-21 PROBLEM — S06.6X0D: Status: ACTIVE | Noted: 2024-12-21

## 2024-12-21 PROBLEM — F31.5 BIPOLAR DISORDER, CURRENT EPISODE DEPRESSED, SEVERE, WITH PSYCHOTIC FEATURES (MULTI): Status: ACTIVE | Noted: 2024-12-21

## 2024-12-21 PROBLEM — S06.6X0S: Status: ACTIVE | Noted: 2023-09-26

## 2024-12-21 PROCEDURE — RXMED WILLOW AMBULATORY MEDICATION CHARGE

## 2024-12-21 ASSESSMENT — ENCOUNTER SYMPTOMS
FATIGUE: 1
DEPRESSION: 1
CARDIOVASCULAR NEGATIVE: 1
MUSCULOSKELETAL NEGATIVE: 1
ENDOCRINE NEGATIVE: 1
GASTROINTESTINAL NEGATIVE: 1
FATIGUE: 1
NERVOUS/ANXIOUS: 1
BRUISES/BLEEDS EASILY: 1
NEUROLOGICAL NEGATIVE: 1
SHORTNESS OF BREATH: 1
EYES NEGATIVE: 1

## 2024-12-23 ENCOUNTER — INFUSION (OUTPATIENT)
Dept: HEMATOLOGY/ONCOLOGY | Facility: CLINIC | Age: 64
End: 2024-12-23
Payer: MEDICARE

## 2024-12-23 ENCOUNTER — TELEPHONE (OUTPATIENT)
Dept: HEMATOLOGY/ONCOLOGY | Facility: CLINIC | Age: 64
End: 2024-12-23
Payer: MEDICARE

## 2024-12-23 VITALS
HEART RATE: 99 BPM | OXYGEN SATURATION: 95 % | BODY MASS INDEX: 46.15 KG/M2 | SYSTOLIC BLOOD PRESSURE: 149 MMHG | RESPIRATION RATE: 18 BRPM | DIASTOLIC BLOOD PRESSURE: 75 MMHG | TEMPERATURE: 98.2 F | WEIGHT: 301 LBS

## 2024-12-23 DIAGNOSIS — D50.9 IRON DEFICIENCY ANEMIA, UNSPECIFIED IRON DEFICIENCY ANEMIA TYPE: ICD-10-CM

## 2024-12-23 DIAGNOSIS — D61.818 PANCYTOPENIA: ICD-10-CM

## 2024-12-23 DIAGNOSIS — D61.9 ANEMIA DUE TO BONE MARROW FAILURE, UNSPECIFIED BONE MARROW FAILURE TYPE (MULTI): ICD-10-CM

## 2024-12-23 DIAGNOSIS — D75.81 MYELOFIBROSIS (MULTI): ICD-10-CM

## 2024-12-23 LAB
ABO GROUP (TYPE) IN BLOOD: NORMAL
ANTIBODY SCREEN: NORMAL
BASOPHILS # BLD AUTO: 0 X10*3/UL (ref 0–0.1)
BASOPHILS NFR BLD AUTO: 0 %
DAT-POLYSPECIFIC: NORMAL
EOSINOPHIL # BLD AUTO: 0.01 X10*3/UL (ref 0–0.7)
EOSINOPHIL NFR BLD AUTO: 0.8 %
ERYTHROCYTE [DISTWIDTH] IN BLOOD BY AUTOMATED COUNT: 17.4 % (ref 11.5–14.5)
HCT VFR BLD AUTO: 19.9 % (ref 41–52)
HGB BLD-MCNC: 6.8 G/DL (ref 13.5–17.5)
IMM GRANULOCYTES # BLD AUTO: 0.01 X10*3/UL (ref 0–0.7)
IMM GRANULOCYTES NFR BLD AUTO: 0.8 % (ref 0–0.9)
LYMPHOCYTES # BLD AUTO: 1.01 X10*3/UL (ref 1.2–4.8)
LYMPHOCYTES NFR BLD AUTO: 81.5 %
MCH RBC QN AUTO: 33.2 PG (ref 26–34)
MCHC RBC AUTO-ENTMCNC: 34.2 G/DL (ref 32–36)
MCV RBC AUTO: 97 FL (ref 80–100)
MONOCYTES # BLD AUTO: 0.06 X10*3/UL (ref 0.1–1)
MONOCYTES NFR BLD AUTO: 4.8 %
NEUTROPHILS # BLD AUTO: 0.15 X10*3/UL (ref 1.2–7.7)
NEUTROPHILS NFR BLD AUTO: 12.1 %
NRBC BLD-RTO: ABNORMAL /100{WBCS}
OVALOCYTES BLD QL SMEAR: NORMAL
PLATELET # BLD AUTO: 15 X10*3/UL (ref 150–450)
POLYCHROMASIA BLD QL SMEAR: NORMAL
RBC # BLD AUTO: 2.05 X10*6/UL (ref 4.5–5.9)
RBC MORPH BLD: NORMAL
RH FACTOR (ANTIGEN D): NORMAL
WBC # BLD AUTO: 1.2 X10*3/UL (ref 4.4–11.3)

## 2024-12-23 PROCEDURE — 86900 BLOOD TYPING SEROLOGIC ABO: CPT

## 2024-12-23 PROCEDURE — 86860 RBC ANTIBODY ELUTION: CPT

## 2024-12-23 PROCEDURE — 86885 COOMBS TEST INDIRECT QUAL: CPT

## 2024-12-23 PROCEDURE — 85025 COMPLETE CBC W/AUTO DIFF WBC: CPT

## 2024-12-23 PROCEDURE — 86922 COMPATIBILITY TEST ANTIGLOB: CPT

## 2024-12-23 PROCEDURE — 86978 RBC PRETREATMENT SERUM: CPT

## 2024-12-23 PROCEDURE — 86901 BLOOD TYPING SEROLOGIC RH(D): CPT

## 2024-12-23 PROCEDURE — 86870 RBC ANTIBODY IDENTIFICATION: CPT

## 2024-12-23 PROCEDURE — 86880 COOMBS TEST DIRECT: CPT

## 2024-12-23 PROCEDURE — 36415 COLL VENOUS BLD VENIPUNCTURE: CPT

## 2024-12-23 RX ORDER — FAMOTIDINE 10 MG/ML
20 INJECTION INTRAVENOUS ONCE AS NEEDED
Status: CANCELLED | OUTPATIENT
Start: 2024-12-23

## 2024-12-23 RX ORDER — DIPHENHYDRAMINE HYDROCHLORIDE 50 MG/ML
50 INJECTION INTRAMUSCULAR; INTRAVENOUS AS NEEDED
Status: CANCELLED | OUTPATIENT
Start: 2024-12-23

## 2024-12-23 RX ORDER — EPINEPHRINE 0.3 MG/.3ML
0.3 INJECTION SUBCUTANEOUS EVERY 5 MIN PRN
Status: CANCELLED | OUTPATIENT
Start: 2024-12-23

## 2024-12-23 RX ORDER — HEPARIN 100 UNIT/ML
500 SYRINGE INTRAVENOUS AS NEEDED
Status: CANCELLED | OUTPATIENT
Start: 2024-12-23

## 2024-12-23 RX ORDER — ALBUTEROL SULFATE 0.83 MG/ML
3 SOLUTION RESPIRATORY (INHALATION) AS NEEDED
Status: CANCELLED | OUTPATIENT
Start: 2024-12-23

## 2024-12-23 RX ORDER — HEPARIN SODIUM,PORCINE/PF 10 UNIT/ML
50 SYRINGE (ML) INTRAVENOUS AS NEEDED
Status: CANCELLED | OUTPATIENT
Start: 2024-12-23

## 2024-12-23 ASSESSMENT — PAIN SCALES - GENERAL: PAINLEVEL_OUTOF10: 0-NO PAIN

## 2024-12-24 ENCOUNTER — PHARMACY VISIT (OUTPATIENT)
Dept: PHARMACY | Facility: CLINIC | Age: 64
End: 2024-12-24
Payer: COMMERCIAL

## 2024-12-26 ENCOUNTER — INFUSION (OUTPATIENT)
Dept: HEMATOLOGY/ONCOLOGY | Facility: CLINIC | Age: 64
End: 2024-12-26
Payer: MEDICARE

## 2024-12-26 VITALS
BODY MASS INDEX: 46.31 KG/M2 | SYSTOLIC BLOOD PRESSURE: 134 MMHG | HEART RATE: 73 BPM | WEIGHT: 302.03 LBS | DIASTOLIC BLOOD PRESSURE: 73 MMHG | RESPIRATION RATE: 18 BRPM | TEMPERATURE: 97.3 F | OXYGEN SATURATION: 92 %

## 2024-12-26 DIAGNOSIS — D75.81 MYELOFIBROSIS (MULTI): ICD-10-CM

## 2024-12-26 DIAGNOSIS — D61.9 ANEMIA DUE TO BONE MARROW FAILURE, UNSPECIFIED BONE MARROW FAILURE TYPE (MULTI): ICD-10-CM

## 2024-12-26 LAB
BLOOD EXPIRATION DATE: NORMAL
DISPENSE STATUS: NORMAL
PRODUCT BLOOD TYPE: 9500
PRODUCT CODE: NORMAL
UNIT ABO: NORMAL
UNIT NUMBER: NORMAL
UNIT RH: NORMAL
UNIT VOLUME: 278
XM INTEP: NORMAL

## 2024-12-26 PROCEDURE — 36430 TRANSFUSION BLD/BLD COMPNT: CPT

## 2024-12-26 PROCEDURE — P9040 RBC LEUKOREDUCED IRRADIATED: HCPCS

## 2024-12-26 RX ORDER — FAMOTIDINE 10 MG/ML
20 INJECTION INTRAVENOUS ONCE AS NEEDED
Status: DISCONTINUED | OUTPATIENT
Start: 2024-12-26 | End: 2024-12-26 | Stop reason: HOSPADM

## 2024-12-26 RX ORDER — HEPARIN SODIUM,PORCINE/PF 10 UNIT/ML
50 SYRINGE (ML) INTRAVENOUS AS NEEDED
Status: DISCONTINUED | OUTPATIENT
Start: 2024-12-26 | End: 2024-12-26 | Stop reason: HOSPADM

## 2024-12-26 RX ORDER — ALBUTEROL SULFATE 0.83 MG/ML
3 SOLUTION RESPIRATORY (INHALATION) AS NEEDED
OUTPATIENT
Start: 2024-12-26

## 2024-12-26 RX ORDER — EPINEPHRINE 0.3 MG/.3ML
0.3 INJECTION SUBCUTANEOUS EVERY 5 MIN PRN
OUTPATIENT
Start: 2024-12-26

## 2024-12-26 RX ORDER — ALBUTEROL SULFATE 0.83 MG/ML
3 SOLUTION RESPIRATORY (INHALATION) AS NEEDED
Status: DISCONTINUED | OUTPATIENT
Start: 2024-12-26 | End: 2024-12-26 | Stop reason: HOSPADM

## 2024-12-26 RX ORDER — EPINEPHRINE 0.3 MG/.3ML
0.3 INJECTION SUBCUTANEOUS EVERY 5 MIN PRN
Status: DISCONTINUED | OUTPATIENT
Start: 2024-12-26 | End: 2024-12-26 | Stop reason: HOSPADM

## 2024-12-26 RX ORDER — HEPARIN 100 UNIT/ML
500 SYRINGE INTRAVENOUS AS NEEDED
OUTPATIENT
Start: 2024-12-26

## 2024-12-26 RX ORDER — DIPHENHYDRAMINE HYDROCHLORIDE 50 MG/ML
50 INJECTION INTRAMUSCULAR; INTRAVENOUS AS NEEDED
Status: DISCONTINUED | OUTPATIENT
Start: 2024-12-26 | End: 2024-12-26 | Stop reason: HOSPADM

## 2024-12-26 RX ORDER — HEPARIN SODIUM,PORCINE/PF 10 UNIT/ML
50 SYRINGE (ML) INTRAVENOUS AS NEEDED
OUTPATIENT
Start: 2024-12-26

## 2024-12-26 RX ORDER — FAMOTIDINE 10 MG/ML
20 INJECTION INTRAVENOUS ONCE AS NEEDED
OUTPATIENT
Start: 2024-12-26

## 2024-12-26 RX ORDER — DIPHENHYDRAMINE HYDROCHLORIDE 50 MG/ML
50 INJECTION INTRAMUSCULAR; INTRAVENOUS AS NEEDED
OUTPATIENT
Start: 2024-12-26

## 2024-12-26 ASSESSMENT — PAIN SCALES - GENERAL: PAINLEVEL_OUTOF10: 8

## 2024-12-26 NOTE — PROGRESS NOTES
PSYCHOSOCIAL ASSESSMENT     Demographic Information  Jaycee Harp  1960  [id number]  Preferred Name: Jaycee  Assessment Type:  Initial  Date of assessment: 12/18/2024  Provider(s): Dr. Beebe and Bryson Quiroz  Diagnosis: Myelofibrosis  Person(s) present during assessment: Patient's mother-in-law  Primary language: English  Interpretive services used: none    Distress Thermometer  Distress Score: N/A  Distress Concerns: N/A                Living Environment/Support Systems  Partner Status:   Children: 3+  Support systems: Fiance, children, mother-in-law  Primary caregiver: Self and Spouse/Partner  Current Living Situation:   Resides with: finance  Concerns with Housing Environment: None  Comments:     Safety  Patient safe at home? No safety concerns noted  History of Domestic Violence: no history of domestic violence disclosed      Functional Status  Functional status: Independent  Patient currently ambulates: Independently  Patient has following equipment: Oxygen  Other physical health issues that the patient is experiencing: No other health issues identified  What supports are in place to assist the patient: None  Transportation:  Self and Family/Friends: fiance/mother-in-law  Comments:         Finances/Insurance  Insurance: [employer] Medicare and Medicaid  Does the patient have any pending insurance applications: No   Hospital Financial Assistance: None  Patient's income source: shelter  Work History: worked for [employer] - retired   History: No  Background  Food Insecurity: No   Does the patient have any financial concerns: No   Any difficulties affording medications? No   Applicable Hannah: No   Comments:      Advance Directives  Advance Directives were not discussed at this time  Health Care Agent Status:Not Activated  Health Care Agent, When applicable:   Comments:    Legal Involvement  Relevant current or previous legal concerns: No current legal concerns at this time     Mormonism or  Spiritual Identity  Comments: Patient identifies as [facility]    Mental Health  Active SI/HI: No  Mood: Appropriate for the situation  Concerns relating to substance use (including alcohol/tobacco): Patient disclosed that he has a history of alcohol abuse - he states that he has stopped drinking and has been sober for 1 month  Cognitive Comments: No cognitive concerns noted  Comments: Patient notes that he lost his son recently from suicide      Assessment  Potential Barriers to Care:  Substance Use  Patient Strengths:  Motivated for Treatment and Self-Advocate    Plan  Referrals: N/A  Applications: N/A  Other: N/A    Narrative: LSW met with patient today during his count check appointment to introduce self and social work services.  Patient is a 64 year old male with a recent diagnosis of myelofibrosis.  He follows with Dr. Beebe and Dr. Massey.  Patient was open to meeting with  and was easily engaged in conversation.  Patient shared that he recently lost his son to suicide shortly before he found out that he had myelofibrosis.  Patient is working through his grief but states that it has been difficult having to grieve the loss of his son and also having the worry about his health.  He states that he has had a troubled past and disclosed that he was incarcerated for 15+ years.  He has been out of senior living for 7 years now but states that he has struggled with alcohol use.  Patient has been sober for a month though as he shared he stopped drinking when he found out about his diagnosis.  He states that it was not difficult for him to stop drinking and he feels that he is coping well at this time.  He worries about his son who he states has been drinking more since learning of his brother's suicide.  We discussed ways that people cope and the grief process. Patient draws support from his rock - he shared that he has a good support system from his finance and his mother-in-law.  Patient has children and  extended family that he states he is close with.  LSW offered resources for counseling and grief therapy but he states at the present he is doing well and denied need for any additional support.    LSW provided an overview of the role of social work.  Patient shared that he is independent in all self-care - his mother-in-law or fiance will typically drive him where he needs to go.  He does have home O2 in place and he denies any further needs at the present.  LSW provided him with my contact information and shared that I would remain available as needed going forward.  Will continue to follow.

## 2024-12-27 LAB
BB ANTIBODY IDENTIFICATION: NORMAL
CASE #: NORMAL

## 2024-12-30 ENCOUNTER — INFUSION (OUTPATIENT)
Dept: HEMATOLOGY/ONCOLOGY | Facility: CLINIC | Age: 64
End: 2024-12-30
Payer: MEDICARE

## 2024-12-30 ENCOUNTER — TELEPHONE (OUTPATIENT)
Dept: HEMATOLOGY/ONCOLOGY | Facility: CLINIC | Age: 64
End: 2024-12-30

## 2024-12-30 VITALS
TEMPERATURE: 97.5 F | HEART RATE: 80 BPM | SYSTOLIC BLOOD PRESSURE: 105 MMHG | WEIGHT: 305.78 LBS | DIASTOLIC BLOOD PRESSURE: 66 MMHG | BODY MASS INDEX: 46.88 KG/M2 | RESPIRATION RATE: 18 BRPM | OXYGEN SATURATION: 93 %

## 2024-12-30 DIAGNOSIS — D61.818 PANCYTOPENIA: ICD-10-CM

## 2024-12-30 DIAGNOSIS — D61.9 ANEMIA DUE TO BONE MARROW FAILURE, UNSPECIFIED BONE MARROW FAILURE TYPE (MULTI): ICD-10-CM

## 2024-12-30 DIAGNOSIS — D75.81 MYELOFIBROSIS (MULTI): ICD-10-CM

## 2024-12-30 DIAGNOSIS — D50.9 IRON DEFICIENCY ANEMIA, UNSPECIFIED IRON DEFICIENCY ANEMIA TYPE: ICD-10-CM

## 2024-12-30 LAB
ABO GROUP (TYPE) IN BLOOD: NORMAL
ANTIBODY SCREEN: NORMAL
BASOPHILS # BLD AUTO: 0 X10*3/UL (ref 0–0.1)
BASOPHILS NFR BLD AUTO: 0 %
DACRYOCYTES BLD QL SMEAR: NORMAL
DAT-COMPLEMENT: NORMAL
DAT-IGG: NORMAL
DAT-POLYSPECIFIC: NORMAL
EOSINOPHIL # BLD AUTO: 0 X10*3/UL (ref 0–0.7)
EOSINOPHIL NFR BLD AUTO: 0 %
ERYTHROCYTE [DISTWIDTH] IN BLOOD BY AUTOMATED COUNT: 17.1 % (ref 11.5–14.5)
HCT VFR BLD AUTO: 19.3 % (ref 41–52)
HGB BLD-MCNC: 6.5 G/DL (ref 13.5–17.5)
IMM GRANULOCYTES # BLD AUTO: 0.01 X10*3/UL (ref 0–0.7)
IMM GRANULOCYTES NFR BLD AUTO: 0.7 % (ref 0–0.9)
LYMPHOCYTES # BLD AUTO: 1.14 X10*3/UL (ref 1.2–4.8)
LYMPHOCYTES NFR BLD AUTO: 80.9 %
MCH RBC QN AUTO: 32.3 PG (ref 26–34)
MCHC RBC AUTO-ENTMCNC: 33.7 G/DL (ref 32–36)
MCV RBC AUTO: 96 FL (ref 80–100)
MONOCYTES # BLD AUTO: 0.1 X10*3/UL (ref 0.1–1)
MONOCYTES NFR BLD AUTO: 7.1 %
NEUTROPHILS # BLD AUTO: 0.16 X10*3/UL (ref 1.2–7.7)
NEUTROPHILS NFR BLD AUTO: 11.3 %
NRBC BLD-RTO: ABNORMAL /100{WBCS}
OVALOCYTES BLD QL SMEAR: NORMAL
PLATELET # BLD AUTO: 12 X10*3/UL (ref 150–450)
POLYCHROMASIA BLD QL SMEAR: NORMAL
RBC # BLD AUTO: 2.01 X10*6/UL (ref 4.5–5.9)
RBC MORPH BLD: NORMAL
RH FACTOR (ANTIGEN D): NORMAL
WBC # BLD AUTO: 1.4 X10*3/UL (ref 4.4–11.3)

## 2024-12-30 PROCEDURE — 86880 COOMBS TEST DIRECT: CPT

## 2024-12-30 PROCEDURE — 86900 BLOOD TYPING SEROLOGIC ABO: CPT

## 2024-12-30 PROCEDURE — 86922 COMPATIBILITY TEST ANTIGLOB: CPT

## 2024-12-30 PROCEDURE — 86901 BLOOD TYPING SEROLOGIC RH(D): CPT

## 2024-12-30 PROCEDURE — 86978 RBC PRETREATMENT SERUM: CPT

## 2024-12-30 PROCEDURE — 86870 RBC ANTIBODY IDENTIFICATION: CPT

## 2024-12-30 PROCEDURE — 85025 COMPLETE CBC W/AUTO DIFF WBC: CPT

## 2024-12-30 PROCEDURE — 86860 RBC ANTIBODY ELUTION: CPT

## 2024-12-30 PROCEDURE — 86885 COOMBS TEST INDIRECT QUAL: CPT

## 2024-12-30 PROCEDURE — 36415 COLL VENOUS BLD VENIPUNCTURE: CPT

## 2024-12-30 RX ORDER — FAMOTIDINE 10 MG/ML
20 INJECTION INTRAVENOUS ONCE AS NEEDED
Status: CANCELLED | OUTPATIENT
Start: 2024-12-30

## 2024-12-30 RX ORDER — OMEPRAZOLE 20 MG/1
20 CAPSULE, DELAYED RELEASE ORAL 2 TIMES DAILY
Qty: 90 CAPSULE | Refills: 0 | Status: SHIPPED | OUTPATIENT
Start: 2024-12-30

## 2024-12-30 RX ORDER — EPINEPHRINE 0.3 MG/.3ML
0.3 INJECTION SUBCUTANEOUS EVERY 5 MIN PRN
Status: CANCELLED | OUTPATIENT
Start: 2024-12-30

## 2024-12-30 RX ORDER — HEPARIN SODIUM,PORCINE/PF 10 UNIT/ML
50 SYRINGE (ML) INTRAVENOUS AS NEEDED
OUTPATIENT
Start: 2024-12-30

## 2024-12-30 RX ORDER — ALBUTEROL SULFATE 0.83 MG/ML
3 SOLUTION RESPIRATORY (INHALATION) AS NEEDED
Status: CANCELLED | OUTPATIENT
Start: 2024-12-30

## 2024-12-30 RX ORDER — DIPHENHYDRAMINE HYDROCHLORIDE 50 MG/ML
50 INJECTION INTRAMUSCULAR; INTRAVENOUS AS NEEDED
Status: CANCELLED | OUTPATIENT
Start: 2024-12-30

## 2024-12-30 RX ORDER — HEPARIN 100 UNIT/ML
500 SYRINGE INTRAVENOUS AS NEEDED
OUTPATIENT
Start: 2024-12-30

## 2024-12-30 ASSESSMENT — PAIN SCALES - GENERAL: PAINLEVEL_OUTOF10: 8

## 2024-12-30 NOTE — PROGRESS NOTES
SUPPORTIVE AND PALLIATIVE ONCOLOGY CONSULT - OUTPATIENT      SERVICE DATE: 1/3/2025    Referred by:  Raul Beebe MD  Medical Oncologist: MD Mirza Fernández MD   Radiation Oncologist: No care team member to display  Primary Physician: Mandeep Tucker  [phone]    REASON FOR CONSULT/CHIEF CONSULT COMPLAINT: pain management, other symptom control, and Introduction to Supportive and Palliative Oncology Services    Subjective   HISTORY OF PRESENT ILLNESS: Jaycee Harp is a 64 y.o. 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 Patient has been referred to Supportive Oncology/Palliative Care for pain and further symptom management.    Pain Assessment:  Pain Score:    Location:    Description:      Symptom Assessment:  Pain:{ :50268}  Numbness or Tingling in hands/feet/other: { :34802}  Sore Muscles/Spasms: { :81239}  Headache: {  :86716}  Dizziness:{ :56466}  Constipation: { :50575}  Diarrhea: { :33730}  Nausea: { :96704}  Vomiting: { :65835}  Lack of Appetite: {  :91585}   Weight Loss: { :95423}  Taste changes: { :42466}  Dry Mouth: { :61777}  Pain in Mouth/Swallowing: { :33690}  Lack of Energy: { :68192}  Difficulty Sleeping: { :62170}  Worrying: {  :72709}  Anxiety: { :26639}  Depression: { :46692}  Shortness of breath: { :46862}  Other: { :59212}      Information obtained from: { :39311}  ______________________________________________________________________     Oncology History   Myelofibrosis (Multi)   11/28/2024 Initial Diagnosis    Myeloproliferative Neoplasm - favor Primary Myelofibrosis  Diagnosis:     BONE MARROW CLOT WITH ASPIRATE AND CORE WITH TOUCH PREP, LEFT ILIAC CREST:    --HYPERCELLULAR BONE MARROW (95%) WITH ATYPICAL MEGAKARYOCYTES AND INCREASED FIBROSIS CONSISTENT WITH MYELOPROLIFERATIVE NEOPLASM, SEE NOTE.    NOTE: Sections show increased and  morphologically atypical megakaryocytes frequently arranged in clusters in a slightly hypercellular bone marrow. Blasts are not increased by morphology or CD34 immunohistochemical stain. There is increaased reticulin fibrosis (MF grade 2). There is not definitive morphologic evidence of dysplasia. 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 The overall findings are most consistent with a myeloproliferative neoplasm, favor primary myelofibrosis. Chromosomal analysis and FISH studies will be attempted and results will be reported separately. Correlation with clinical findings is recommended         Past Medical History:   Diagnosis Date    Contact with and (suspected) exposure to covid-19 08/28/2020    Exposure to 2019 novel coronavirus    Hematuria 10/12/2023    Personal history of other endocrine, nutritional and metabolic disease 01/13/2021    History of obesity    Personal history of other specified conditions 08/28/2020    History of nasal congestion     Past Surgical History:   Procedure Laterality Date    OTHER SURGICAL HISTORY  10/31/2019    Rectal surgery     Family History   Problem Relation Name Age of Onset    Diabetes Mother      Other (varicose veins) Mother      Liver cancer Mother      Diabetes Father      Tuberculosis Father          SOCIAL HISTORY  Social History:  reports that he quit smoking about 54 years ago. His smoking use included cigarettes. He started smoking about 3 months ago. He has been exposed to tobacco smoke. He has quit using smokeless tobacco. He reports that he does not currently use alcohol after a past usage of about 24.0 standard drinks of alcohol per week. He reports current drug use. Drug: Marijuana.  Quit all etoh and tobacco  Has a history of heroin, acid use. Nothing since diagnosis. (Last heroin was 2016).  He does use edibles.   Lives with spouse, has two dogs  and two cats.    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  Hasn't been able to do anything since getting sick.     REVIEW OF SYSTEMS  Review of systems negative unless noted in HPI.       Objective     Current Outpatient Medications   Medication Instructions    albuterol (ProAir HFA) 90 mcg/actuation inhaler 2 puffs, inhalation, Every 4 hours PRN    budesonide-glycopyr-formoterol (Breztri Aerosphere) 160-9-4.8 mcg/actuation HFA aerosol inhaler 2 puffs, 2 times daily RT    hydrOXYzine pamoate (VISTARIL) 25 mg, oral, Every 6 hours PRN    losartan-hydrochlorothiazide (Hyzaar) 50-12.5 mg tablet 1 tablet, oral, Daily    Ojjaara 100 mg, oral, Daily    omeprazole (PRILOSEC) 20 mg, oral, 2 times daily, Do not crush or chew.    POTASSIUM CHLORIDE ORAL Take by mouth.    tadalafil (CIALIS) 20 mg, oral, Daily PRN    varenicline (CHANTIX) 0.5 mg, 2 times daily       Allergies: No Known Allergies    {If you would like to pull in Lab results for the last 24 hours, type .[id number] :99}  {If you would like to pull in Imaging results, type .imgrslt :99}       PHYSICAL EXAMINATION  Vital Signs:   Vital signs reviewed  There were no vitals filed for this visit.  Pain Score:           Physical Exam    ASSESSMENT/PLAN    Pain  Pain is: { :70720}  Type: { :05976}  Pain control: { :35919}  Home regimen: ***  Intolerances/previously tried: ***  Personalized pain goal: ***    Opioid Use  Medication Management:   - OARRS report reviewed with no aberrant behavior; consistent with  prescriptions/records and patient history  - MED ***.  Overdose Risk Score ***.   This has been discussed with patient.   - We will continue to closely monitor the patient for signs of prescription misuse including UDS, OARRS review and subjective reports at each visit.  - *** concurrent benzodiazepine use   - I am a provider who either is or has  consulted and collaborated with a provider certified in Hospice and Palliative Medicine and have conducted a face-face visit and examination for this patient.  - Routine Urine Drug Screen completed *** appropriately positive for opioids and negative for illicit substances  - Controlled Substance Agreement completed ***  - Specifically discussed that controlled substance prescriptions will only be provided by our group as outlined in the completed agreement  - Prescribed naloxone ***  - Red Flags: ***    Constipation   At risk for constipation related to opioids, ***,  { :47651}   Usual bowel pattern: ***   Current regimen: ***   LBM ***   ***     Nausea   { :70031} nausea {with or without:14171} vomiting related to { :98736} ***    Decreased Appetite  Related to { :89727}  Nutrition { :90230}  Weight loss ***  Current regimen:  ***  ***    Altered Mood  {Acute/Chronic:79997} {anxiety/depression:93583} related to {related to:11422}   {controlled/uncontrolled:00530} with home regimen  Current regimen: ***    Sleeping Difficulty:  Impaired sleep related to ***  Current regimen:  ***  ***    Supportive Interventions: { :56744}    Introduction to Supportive and Palliative Oncology:  Spoke with ***   Introduced the role and philosophy of Supportive and Palliative oncology in the evaluation and management of symptoms during cancer treatment  Palliative care was introduced as a service for patients with serious illness to help with symptoms, assist with goals of care conversations, navigate complex decision making, improve quality of life for patients, and provide support both patients and families.  Patient seemed to appreciate the extra layer of support.    Advance Directives  Existence of Advance Directives:{ :93280}  Decision maker: { :91893} ***  Code Status: { :52907}    Next Follow-Up Visit:  Return to clinic in ***    Signature and billing  Thank you for allowing us to participate in the care of this patient.  Recommendations will be communicated back to the consulting service by way of shared electronic medical record or face-to-face.    Medical complexity was {medical complexity:51219} level due to due to complexity of problems, extensive data review, and high risk of management/treatment.  Time was spent on the following: {time spent:11607}. Total time spent: ***      DATA   Diagnostic tests and information reviewed for today's visit:  { :74580}     Plan of Care discussed with: { :37018}      SIGNATURE: LALO Valerio    Contact information:  Supportive and Palliative Oncology  Monday-Friday 8 AM-5 PM  Phone:  [phone], press option #5, then option #1.   Or Epic Secure Chat

## 2024-12-31 LAB
BB ANTIBODY IDENTIFICATION: NORMAL
CASE #: NORMAL

## 2024-12-31 RX ORDER — ALBUTEROL SULFATE 0.83 MG/ML
3 SOLUTION RESPIRATORY (INHALATION) AS NEEDED
OUTPATIENT
Start: 2024-12-31

## 2024-12-31 RX ORDER — EPINEPHRINE 0.3 MG/.3ML
0.3 INJECTION SUBCUTANEOUS EVERY 5 MIN PRN
OUTPATIENT
Start: 2024-12-31

## 2024-12-31 RX ORDER — FAMOTIDINE 10 MG/ML
20 INJECTION INTRAVENOUS ONCE AS NEEDED
OUTPATIENT
Start: 2024-12-31

## 2024-12-31 RX ORDER — DIPHENHYDRAMINE HYDROCHLORIDE 50 MG/ML
50 INJECTION INTRAMUSCULAR; INTRAVENOUS AS NEEDED
OUTPATIENT
Start: 2024-12-31

## 2025-01-02 ENCOUNTER — APPOINTMENT (OUTPATIENT)
Dept: HEMATOLOGY/ONCOLOGY | Facility: CLINIC | Age: 65
End: 2025-01-02
Payer: MEDICARE

## 2025-01-02 ENCOUNTER — OFFICE VISIT (OUTPATIENT)
Dept: HEMATOLOGY/ONCOLOGY | Facility: CLINIC | Age: 65
End: 2025-01-02
Payer: MEDICARE

## 2025-01-02 ENCOUNTER — INFUSION (OUTPATIENT)
Dept: HEMATOLOGY/ONCOLOGY | Facility: CLINIC | Age: 65
End: 2025-01-02
Payer: MEDICARE

## 2025-01-02 VITALS
BODY MASS INDEX: 46.78 KG/M2 | WEIGHT: 305.12 LBS | HEART RATE: 100 BPM | OXYGEN SATURATION: 93 % | RESPIRATION RATE: 16 BRPM | SYSTOLIC BLOOD PRESSURE: 144 MMHG | TEMPERATURE: 98.4 F | DIASTOLIC BLOOD PRESSURE: 69 MMHG

## 2025-01-02 VITALS
HEART RATE: 81 BPM | TEMPERATURE: 99 F | SYSTOLIC BLOOD PRESSURE: 139 MMHG | OXYGEN SATURATION: 99 % | DIASTOLIC BLOOD PRESSURE: 74 MMHG | RESPIRATION RATE: 18 BRPM

## 2025-01-02 DIAGNOSIS — D75.81 MYELOFIBROSIS (MULTI): Primary | ICD-10-CM

## 2025-01-02 DIAGNOSIS — R91.1 RIGHT UPPER LOBE PULMONARY NODULE: ICD-10-CM

## 2025-01-02 DIAGNOSIS — D75.81 MYELOFIBROSIS (MULTI): ICD-10-CM

## 2025-01-02 DIAGNOSIS — M54.50 CHRONIC BILATERAL LOW BACK PAIN WITHOUT SCIATICA: ICD-10-CM

## 2025-01-02 DIAGNOSIS — R63.5 WEIGHT GAIN, ABNORMAL: ICD-10-CM

## 2025-01-02 DIAGNOSIS — Z76.82 STEM CELL TRANSPLANT CANDIDATE: ICD-10-CM

## 2025-01-02 DIAGNOSIS — D61.9 ANEMIA DUE TO BONE MARROW FAILURE, UNSPECIFIED BONE MARROW FAILURE TYPE (MULTI): ICD-10-CM

## 2025-01-02 DIAGNOSIS — G89.29 CHRONIC BILATERAL LOW BACK PAIN WITHOUT SCIATICA: ICD-10-CM

## 2025-01-02 LAB
BLOOD EXPIRATION DATE: NORMAL
DISPENSE STATUS: NORMAL
PRODUCT BLOOD TYPE: 9500
PRODUCT CODE: NORMAL
UNIT ABO: NORMAL
UNIT NUMBER: NORMAL
UNIT RH: NORMAL
UNIT VOLUME: 283
XM INTEP: NORMAL

## 2025-01-02 PROCEDURE — 3077F SYST BP >= 140 MM HG: CPT | Performed by: INTERNAL MEDICINE

## 2025-01-02 PROCEDURE — 99215 OFFICE O/P EST HI 40 MIN: CPT | Performed by: INTERNAL MEDICINE

## 2025-01-02 PROCEDURE — 99215 OFFICE O/P EST HI 40 MIN: CPT | Mod: 25 | Performed by: INTERNAL MEDICINE

## 2025-01-02 PROCEDURE — 1160F RVW MEDS BY RX/DR IN RCRD: CPT | Performed by: INTERNAL MEDICINE

## 2025-01-02 PROCEDURE — P9040 RBC LEUKOREDUCED IRRADIATED: HCPCS

## 2025-01-02 PROCEDURE — 1126F AMNT PAIN NOTED NONE PRSNT: CPT | Performed by: INTERNAL MEDICINE

## 2025-01-02 PROCEDURE — G2211 COMPLEX E/M VISIT ADD ON: HCPCS | Performed by: INTERNAL MEDICINE

## 2025-01-02 PROCEDURE — 1123F ACP DISCUSS/DSCN MKR DOCD: CPT | Performed by: INTERNAL MEDICINE

## 2025-01-02 PROCEDURE — 3078F DIAST BP <80 MM HG: CPT | Performed by: INTERNAL MEDICINE

## 2025-01-02 PROCEDURE — 1159F MED LIST DOCD IN RCRD: CPT | Performed by: INTERNAL MEDICINE

## 2025-01-02 PROCEDURE — 36430 TRANSFUSION BLD/BLD COMPNT: CPT

## 2025-01-02 ASSESSMENT — PAIN SCALES - GENERAL: PAINLEVEL_OUTOF10: 0-NO PAIN

## 2025-01-02 NOTE — ASSESSMENT & PLAN NOTE
1/2/2025: Overall seems to be tolerating momelotinib well, his platelets remain very low, so I am hesitant to adjust his dose and I think we will continue at the 100 mg dose as prescribed by Dr. Beebe.  Fortunately his platelets do not seem to be dropping consistently under 10 where he has frequent platelet transfusions though this has continued to be occasionally occurring.    We did complete a total symptom score today:  Myeloproliferative Neoplasm Total Symptom Score:   Fatigue (weariness, tiredness) What number describes your WORST level of fatigue during past 24 hours 8   Early Satiety (Filling up quickly) 0   Abdominal Discomfort 8   Inactivity 10   Problems with concentration, compared to prior to MPN diagnosis 10   Itching (pruritis) 8   Dizziness 8   Bone pain (diffuse not joint pain or  arthritis) 10   Fever (> 100F or 38 C) 2   Unintentional Weight Loss last six months 0    Total = 64       Diagnostics:  - none further   Treatment:  - continue momelotinib as prescribed by Dr. Beebe - has only just started   Disease/Toxicity Monitoring:  - CBC weekly   Supportive Care:  - Weekly CBC for transfusion dependence   Antimicrobial Prophylaxis:   - We did not discuss today, but likely add acyclovir   IV access:  - PIV as needed - may consider port

## 2025-01-02 NOTE — ASSESSMENT & PLAN NOTE
1/2/2025: seen by Dr. Garcia in Thoracic Surgery who recommended resection if feasible, but severe thrombocytopenia is likely a significant barrier to being able to receive this -has not had a follow-up visit recently, and I will reach out to may be reasonable to consider stereotactic body radiation therapy  - will reach out to thoracic surgery

## 2025-01-02 NOTE — LETTER
January 3, 2025      TO: DO Mandeep Keen DO  [address]  [place] [address]       Regarding:   Patient:  :  Visit Date: Jaycee Harp  1960       Dear Dr. Mandeep Tucker DO     I saw our mutual patient Jaycee Harp for an interval visit in the malignant hematology clinic at [facility].  Please see below for my notes from this encounter. Please do not hesitate to call me if you have any questions. I look forward to continuing to follow your patient along with you.      Sincerely,    Mirza Massey MD         CC: Mandeep Tucker DO  [address]  [place] [address]  Via In Basket    Mandeep Tucker DO  [address]  [place] [address]       Please see my documentation below:   Patient ID: Jaycee Harp is a 64 y.o. male.  Referring Physician: Mirza Massey MD  [id number] [id number]  Primary Care Provider: Mandeep Tucker DO    Date of Service:  2025    Oncology History   Myelofibrosis (Multi)   2024 Initial Diagnosis    Myeloproliferative Neoplasm - favor Primary Myelofibrosis  Diagnosis:     BONE MARROW CLOT WITH ASPIRATE AND CORE WITH TOUCH PREP, LEFT ILIAC CREST:    --HYPERCELLULAR BONE MARROW (95%) WITH ATYPICAL MEGAKARYOCYTES AND INCREASED FIBROSIS CONSISTENT WITH MYELOPROLIFERATIVE NEOPLASM, SEE NOTE.    NOTE: Sections show increased and morphologically atypical megakaryocytes frequently arranged in clusters in a slightly hypercellular bone marrow. Blasts are not increased by morphology or CD34 immunohistochemical stain. There is increaased reticulin fibrosis (MF grade 2). There is not definitive morphologic evidence of dysplasia. 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 The overall findings are most consistent with a myeloproliferative neoplasm, favor primary myelofibrosis.  "Chromosomal analysis and FISH studies will be attempted and results will be reported separately. Correlation with clinical findings is recommended  Molecular: DNMT3A p.R736H ([id number] c.[id number]>A)  U2AF1 p.S34F ([id number] c.[id number]>T)  Karyotype/microarray: karyotype culture failure; microarray with Allelic imbalances (copy neutral loss of heterozygosity) were detected including 2p25.3p14(0-68,210,872).      12/2/2024 -  Chemotherapy    Treatment:   I. Momelotinib 200mg/day -  Month 1: started 12/2/2024 -   100mg           About a month on the pill - just started his nexgt refill.  Feels that the pill makes him feel \"raw.  Feels that his stools is alike \"cake\"  So he's actually getting -     Not really having diarrhea - passing gas and needing a lot of tissues.  Getting to feel some pain - he points to the R side of the chest.  But after using stool medicine, the pain went away.  He noted pain in the low back, and cannot stand for very long.  Still significant amounts of pain overall.  Overall 1-2 bowel movements - not pure water or liquid - but notes that he's eating a lot.  (Sig other indicates that he's thinking that \"sick people don't eat.\").  Has spoken with the nutritionists.  He reported that speaking that         Assessment & Plan  Myelofibrosis (Multi)  1/2/2025: Overall seems to be tolerating momelotinib well, his platelets remain very low, so I am hesitant to adjust his dose and I think we will continue at the 100 mg dose as prescribed by Dr. Beebe.  Fortunately his platelets do not seem to be dropping consistently under 10 where he has frequent platelet transfusions though this has continued to be occasionally occurring.    We did complete a total symptom score today:  Myeloproliferative Neoplasm Total Symptom Score:   Fatigue (weariness, tiredness) What number describes your WORST level of fatigue during past 24 hours 8   Early Satiety (Filling up quickly) 0   Abdominal Discomfort 8   Inactivity 10 "   Problems with concentration, compared to prior to MPN diagnosis 10   Itching (pruritis) 8   Dizziness 8   Bone pain (diffuse not joint pain or  arthritis) 10   Fever (> 100F or 38 C) 2   Unintentional Weight Loss last six months 0    Total = 64       Diagnostics:  - none further   Treatment:  - continue momelotinib as prescribed by Dr. Beebe - has only just started   Disease/Toxicity Monitoring:  - CBC weekly   Supportive Care:  - Weekly CBC for transfusion dependence   Antimicrobial Prophylaxis:   - We did not discuss today, but likely add acyclovir   IV access:  - PIV as needed - may consider port    Stem cell transplant candidate  1/2/2025: Based on the severity of his myelofibrosis, workup for stem cell transplant is probably the most appropriate step - however, I am concern that he may have significant barriers to successful transplant, including his comorbid conditions, as well as potential social barriers.      He has an appointment with a primary program next week, I noted that today he actually arrived wearing oxygen, which I think is a significant concern if he actually needs this on a daily basis.  Plan:  -To see Dr. Vargas next week  Right upper lobe pulmonary nodule  1/2/2025: seen by Dr. Garcia in Thoracic Surgery who recommended resection if feasible, but severe thrombocytopenia is likely a significant barrier to being able to receive this -has not had a follow-up visit recently, and I will reach out to may be reasonable to consider stereotactic body radiation therapy  - will reach out to thoracic surgery   Weight gain, abnormal  1/2/2025: This was his main concern, I actually noted that I was reassured that he is able to gain weight as he does not have this is a significant B symptoms.  However I noted that it is still important for him follow a healthy, well-balanced diet, and if he is truly gaining significant amount of weight, it is going to be necessary for him to start monitoring his diet  paying attention to that.  I also recommended physical activity/exercise as a way to mitigate many of his symptoms.  There is increasing data set that exercise can alleviate a number of symptoms both cancer itself but also side effects from treatment  - nutrition reconnection  Chronic bilateral low back pain without sciatica  1/2/2025: Overall his pain symptoms appear to be joint, back related.  They may be related to his increased weight gain.  Exam and by history I do not have red flag symptoms and I am concerned about, but I recommended that he establish with physical therapy in order to come up with a plan to try to improve his symptoms, and consider developing a home exercise plan  -Physical therapy referral             Subjective    Reviewed and Past Medical History, Past Surgical History, Family History, and Social History:    Review of System  Review of Systems   Constitutional:  Positive for fatigue.   Respiratory:  Positive for shortness of breath. Negative for cough.    Cardiovascular:  Negative for chest pain and leg swelling.   Gastrointestinal:  Positive for abdominal pain and diarrhea. Negative for nausea.   Musculoskeletal:  Positive for back pain. Negative for flank pain.   Neurological:  Negative for dizziness, light-headedness and numbness.   Hematological:  Does not bruise/bleed easily.        Epsistaxis       Home Medications and Adherence Reviewed with Patient.       Objective     VS:  /69 (BP Location: Right arm, Patient Position: Sitting, BP Cuff Size: Large adult)   Pulse 100   Temp 36.9 °C (98.4 °F) (Temporal)   Resp 16   Wt 138 kg (305 lb 1.9 oz)   SpO2 93%   BMI 46.78 kg/m²   BSA: 2.57 meters squared  KPS: 70    Physical Exam  Physical Exam  Constitutional:       Appearance: Normal appearance. He is obese.   HENT:      Mouth/Throat:      Mouth: Mucous membranes are moist.   Eyes:      Conjunctiva/sclera: Conjunctivae normal.      Pupils: Pupils are equal, round, and reactive  to light.   Cardiovascular:      Rate and Rhythm: Normal rate.   Pulmonary:      Effort: Pulmonary effort is normal.   Abdominal:      General: Abdomen is flat.      Palpations: Abdomen is soft.   Musculoskeletal:         General: Normal range of motion.   Skin:     General: Skin is warm and dry.   Neurological:      General: No focal deficit present.      Mental Status: He is oriented to person, place, and time.   Psychiatric:         Mood and Affect: Mood normal.         Behavior: Behavior normal.          Laboratory:  Pertinent laboratory results were reviewed and discussed with patient, notably:   Severe thrombocytopenia and anemia persist on today's labs         Mirza Massey MD

## 2025-01-02 NOTE — ASSESSMENT & PLAN NOTE
1/2/2025: This was his main concern, I actually noted that I was reassured that he is able to gain weight as he does not have this is a significant B symptoms.  However I noted that it is still important for him follow a healthy, well-balanced diet, and if he is truly gaining significant amount of weight, it is going to be necessary for him to start monitoring his diet paying attention to that.  I also recommended physical activity/exercise as a way to mitigate many of his symptoms.  There is increasing data set that exercise can alleviate a number of symptoms both cancer itself but also side effects from treatment  - nutrition reconnection

## 2025-01-03 ENCOUNTER — APPOINTMENT (OUTPATIENT)
Dept: PALLIATIVE MEDICINE | Facility: CLINIC | Age: 65
End: 2025-01-03
Payer: MEDICARE

## 2025-01-03 DIAGNOSIS — Z79.891 ENCOUNTER FOR MONITORING OPIOID MAINTENANCE THERAPY: Primary | ICD-10-CM

## 2025-01-03 DIAGNOSIS — D75.81 MYELOFIBROSIS (MULTI): ICD-10-CM

## 2025-01-03 DIAGNOSIS — Z51.81 ENCOUNTER FOR MONITORING OPIOID MAINTENANCE THERAPY: Primary | ICD-10-CM

## 2025-01-03 ASSESSMENT — ENCOUNTER SYMPTOMS
BRUISES/BLEEDS EASILY: 0
FLANK PAIN: 0
NUMBNESS: 0
LIGHT-HEADEDNESS: 0
FATIGUE: 1
DIZZINESS: 0
NAUSEA: 0
LEG SWELLING: 0
BACK PAIN: 1
DIARRHEA: 1
COUGH: 0
ABDOMINAL PAIN: 1
SHORTNESS OF BREATH: 1

## 2025-01-03 NOTE — PROGRESS NOTES
"Patient ID: Jaycee Harp is a 64 y.o. male.  Referring Physician: Mirza Massey MD  [address]  [place] [id number]  Primary Care Provider: Mandeep Tucker DO    Date of Service:  1/2/2025    Oncology History   Myelofibrosis (Multi)   11/28/2024 Initial Diagnosis    Myeloproliferative Neoplasm - favor Primary Myelofibrosis  Diagnosis:     BONE MARROW CLOT WITH ASPIRATE AND CORE WITH TOUCH PREP, LEFT ILIAC CREST:    --HYPERCELLULAR BONE MARROW (95%) WITH ATYPICAL MEGAKARYOCYTES AND INCREASED FIBROSIS CONSISTENT WITH MYELOPROLIFERATIVE NEOPLASM, SEE NOTE.    NOTE: Sections show increased and morphologically atypical megakaryocytes frequently arranged in clusters in a slightly hypercellular bone marrow. Blasts are not increased by morphology or CD34 immunohistochemical stain. There is increaased reticulin fibrosis (MF grade 2). There is not definitive morphologic evidence of dysplasia. 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 The overall findings are most consistent with a myeloproliferative neoplasm, favor primary myelofibrosis. Chromosomal analysis and FISH studies will be attempted and results will be reported separately. Correlation with clinical findings is recommended  Molecular: DNMT3A p.R736H ([id number] c.2207G>A)  U2AF1 p.S34F ([id number] c.[id number]>T)  Karyotype/microarray: karyotype culture failure; microarray with Allelic imbalances (copy neutral loss of heterozygosity) were detected including 2p25.3p14(0-68,210,872).      12/2/2024 -  Chemotherapy    Treatment:   I. Momelotinib 200mg/day -  Month 1: started 12/2/2024 -   100mg           About a month on the pill - just started his nexgt refill.  Feels that the pill makes him feel \"raw.  Feels that his stools is alike \"cake\"  So he's actually getting -     Not really having diarrhea - passing gas and needing a lot of tissues.  " "Getting to feel some pain - he points to the R side of the chest.  But after using stool medicine, the pain went away.  He noted pain in the low back, and cannot stand for very long.  Still significant amounts of pain overall.  Overall 1-2 bowel movements - not pure water or liquid - but notes that he's eating a lot.  (Sig other indicates that he's thinking that \"sick people don't eat.\").  Has spoken with the nutritionists.  He reported that speaking that         Assessment & Plan  Myelofibrosis (Multi)  1/2/2025: Overall seems to be tolerating momelotinib well, his platelets remain very low, so I am hesitant to adjust his dose and I think we will continue at the 100 mg dose as prescribed by Dr. Beebe.  Fortunately his platelets do not seem to be dropping consistently under 10 where he has frequent platelet transfusions though this has continued to be occasionally occurring.    We did complete a total symptom score today:  Myeloproliferative Neoplasm Total Symptom Score:   Fatigue (weariness, tiredness) What number describes your WORST level of fatigue during past 24 hours 8   Early Satiety (Filling up quickly) 0   Abdominal Discomfort 8   Inactivity 10   Problems with concentration, compared to prior to MPN diagnosis 10   Itching (pruritis) 8   Dizziness 8   Bone pain (diffuse not joint pain or  arthritis) 10   Fever (> 100F or 38 C) 2   Unintentional Weight Loss last six months 0    Total = 64       Diagnostics:  - none further   Treatment:  - continue momelotinib as prescribed by Dr. Beebe - has only just started   Disease/Toxicity Monitoring:  - CBC weekly   Supportive Care:  - Weekly CBC for transfusion dependence   Antimicrobial Prophylaxis:   - We did not discuss today, but likely add acyclovir   IV access:  - PIV as needed - may consider port    Stem cell transplant candidate  1/2/2025: Based on the severity of his myelofibrosis, workup for stem cell transplant is probably the most appropriate step - " however, I am concern that he may have significant barriers to successful transplant, including his comorbid conditions, as well as potential social barriers.      He has an appointment with a primary program next week, I noted that today he actually arrived wearing oxygen, which I think is a significant concern if he actually needs this on a daily basis.  Plan:  -To see Dr. Vargas next week  Right upper lobe pulmonary nodule  1/2/2025: seen by Dr. Garcia in Thoracic Surgery who recommended resection if feasible, but severe thrombocytopenia is likely a significant barrier to being able to receive this -has not had a follow-up visit recently, and I will reach out to may be reasonable to consider stereotactic body radiation therapy  - will reach out to thoracic surgery   Weight gain, abnormal  1/2/2025: This was his main concern, I actually noted that I was reassured that he is able to gain weight as he does not have this is a significant B symptoms.  However I noted that it is still important for him follow a healthy, well-balanced diet, and if he is truly gaining significant amount of weight, it is going to be necessary for him to start monitoring his diet paying attention to that.  I also recommended physical activity/exercise as a way to mitigate many of his symptoms.  There is increasing data set that exercise can alleviate a number of symptoms both cancer itself but also side effects from treatment  - nutrition reconnection  Chronic bilateral low back pain without sciatica  1/2/2025: Overall his pain symptoms appear to be joint, back related.  They may be related to his increased weight gain.  Exam and by history I do not have red flag symptoms and I am concerned about, but I recommended that he establish with physical therapy in order to come up with a plan to try to improve his symptoms, and consider developing a home exercise plan  -Physical therapy referral             Subjective     Reviewed and Past  Medical History, Past Surgical History, Family History, and Social History:    Review of System  Review of Systems   Constitutional:  Positive for fatigue.   Respiratory:  Positive for shortness of breath. Negative for cough.    Cardiovascular:  Negative for chest pain and leg swelling.   Gastrointestinal:  Positive for abdominal pain and diarrhea. Negative for nausea.   Musculoskeletal:  Positive for back pain. Negative for flank pain.   Neurological:  Negative for dizziness, light-headedness and numbness.   Hematological:  Does not bruise/bleed easily.        Epsistaxis       Home Medications and Adherence Reviewed with Patient.       Objective      VS:  /69 (BP Location: Right arm, Patient Position: Sitting, BP Cuff Size: Large adult)   Pulse 100   Temp 36.9 °C (98.4 °F) (Temporal)   Resp 16   Wt 138 kg (305 lb 1.9 oz)   SpO2 93%   BMI 46.78 kg/m²   BSA: 2.57 meters squared  KPS: 70    Physical Exam  Physical Exam  Constitutional:       Appearance: Normal appearance. He is obese.   HENT:      Mouth/Throat:      Mouth: Mucous membranes are moist.   Eyes:      Conjunctiva/sclera: Conjunctivae normal.      Pupils: Pupils are equal, round, and reactive to light.   Cardiovascular:      Rate and Rhythm: Normal rate.   Pulmonary:      Effort: Pulmonary effort is normal.   Abdominal:      General: Abdomen is flat.      Palpations: Abdomen is soft.   Musculoskeletal:         General: Normal range of motion.   Skin:     General: Skin is warm and dry.   Neurological:      General: No focal deficit present.      Mental Status: He is oriented to person, place, and time.   Psychiatric:         Mood and Affect: Mood normal.         Behavior: Behavior normal.          Laboratory:  Pertinent laboratory results were reviewed and discussed with patient, notably:   Severe thrombocytopenia and anemia persist on today's labs         Mirza Massey MD

## 2025-01-06 ENCOUNTER — TELEPHONE (OUTPATIENT)
Dept: HEMATOLOGY/ONCOLOGY | Facility: CLINIC | Age: 65
End: 2025-01-06

## 2025-01-06 ENCOUNTER — INFUSION (OUTPATIENT)
Dept: HEMATOLOGY/ONCOLOGY | Facility: CLINIC | Age: 65
End: 2025-01-06
Payer: MEDICARE

## 2025-01-06 VITALS
OXYGEN SATURATION: 95 % | TEMPERATURE: 97.9 F | BODY MASS INDEX: 46.54 KG/M2 | RESPIRATION RATE: 18 BRPM | WEIGHT: 303.57 LBS | HEART RATE: 93 BPM | SYSTOLIC BLOOD PRESSURE: 146 MMHG | DIASTOLIC BLOOD PRESSURE: 74 MMHG

## 2025-01-06 DIAGNOSIS — D50.9 IRON DEFICIENCY ANEMIA, UNSPECIFIED IRON DEFICIENCY ANEMIA TYPE: ICD-10-CM

## 2025-01-06 DIAGNOSIS — D61.818 PANCYTOPENIA: ICD-10-CM

## 2025-01-06 DIAGNOSIS — D75.81 MYELOFIBROSIS (MULTI): ICD-10-CM

## 2025-01-06 DIAGNOSIS — D61.9 ANEMIA DUE TO BONE MARROW FAILURE, UNSPECIFIED BONE MARROW FAILURE TYPE (MULTI): ICD-10-CM

## 2025-01-06 LAB
ABO GROUP (TYPE) IN BLOOD: NORMAL
ANTIBODY SCREEN: NORMAL
BASOPHILS # BLD AUTO: 0 X10*3/UL (ref 0–0.1)
BASOPHILS NFR BLD AUTO: 0 %
EOSINOPHIL # BLD AUTO: 0 X10*3/UL (ref 0–0.7)
EOSINOPHIL NFR BLD AUTO: 0 %
ERYTHROCYTE [DISTWIDTH] IN BLOOD BY AUTOMATED COUNT: 17 % (ref 11.5–14.5)
HCT VFR BLD AUTO: 19.3 % (ref 41–52)
HGB BLD-MCNC: 6.7 G/DL (ref 13.5–17.5)
IMM GRANULOCYTES # BLD AUTO: 0 X10*3/UL (ref 0–0.7)
IMM GRANULOCYTES NFR BLD AUTO: 0 % (ref 0–0.9)
LYMPHOCYTES # BLD AUTO: 1.18 X10*3/UL (ref 1.2–4.8)
LYMPHOCYTES NFR BLD AUTO: 81.4 %
MCH RBC QN AUTO: 32.2 PG (ref 26–34)
MCHC RBC AUTO-ENTMCNC: 34.7 G/DL (ref 32–36)
MCV RBC AUTO: 93 FL (ref 80–100)
MONOCYTES # BLD AUTO: 0.07 X10*3/UL (ref 0.1–1)
MONOCYTES NFR BLD AUTO: 4.8 %
NEUTROPHILS # BLD AUTO: 0.2 X10*3/UL (ref 1.2–7.7)
NEUTROPHILS NFR BLD AUTO: 13.8 %
NRBC BLD-RTO: ABNORMAL /100{WBCS}
OVALOCYTES BLD QL SMEAR: NORMAL
PLATELET # BLD AUTO: 12 X10*3/UL (ref 150–450)
POLYCHROMASIA BLD QL SMEAR: NORMAL
RBC # BLD AUTO: 2.08 X10*6/UL (ref 4.5–5.9)
RBC MORPH BLD: NORMAL
RH FACTOR (ANTIGEN D): NORMAL
WBC # BLD AUTO: 1.5 X10*3/UL (ref 4.4–11.3)

## 2025-01-06 PROCEDURE — 85025 COMPLETE CBC W/AUTO DIFF WBC: CPT

## 2025-01-06 PROCEDURE — 36415 COLL VENOUS BLD VENIPUNCTURE: CPT

## 2025-01-06 PROCEDURE — 86860 RBC ANTIBODY ELUTION: CPT

## 2025-01-06 PROCEDURE — 86900 BLOOD TYPING SEROLOGIC ABO: CPT

## 2025-01-06 PROCEDURE — 86978 RBC PRETREATMENT SERUM: CPT

## 2025-01-06 PROCEDURE — 86901 BLOOD TYPING SEROLOGIC RH(D): CPT

## 2025-01-06 PROCEDURE — 86880 COOMBS TEST DIRECT: CPT

## 2025-01-06 PROCEDURE — 86870 RBC ANTIBODY IDENTIFICATION: CPT

## 2025-01-06 PROCEDURE — 86922 COMPATIBILITY TEST ANTIGLOB: CPT

## 2025-01-06 PROCEDURE — 81403 MOPATH PROCEDURE LEVEL 4: CPT

## 2025-01-06 RX ORDER — EPINEPHRINE 0.3 MG/.3ML
0.3 INJECTION SUBCUTANEOUS EVERY 5 MIN PRN
Status: CANCELLED | OUTPATIENT
Start: 2025-01-06

## 2025-01-06 RX ORDER — ALBUTEROL SULFATE 0.83 MG/ML
3 SOLUTION RESPIRATORY (INHALATION) AS NEEDED
Status: CANCELLED | OUTPATIENT
Start: 2025-01-06

## 2025-01-06 RX ORDER — HEPARIN 100 UNIT/ML
500 SYRINGE INTRAVENOUS AS NEEDED
Status: CANCELLED | OUTPATIENT
Start: 2025-01-06

## 2025-01-06 RX ORDER — DIPHENHYDRAMINE HYDROCHLORIDE 50 MG/ML
50 INJECTION INTRAMUSCULAR; INTRAVENOUS AS NEEDED
Status: CANCELLED | OUTPATIENT
Start: 2025-01-06

## 2025-01-06 RX ORDER — FAMOTIDINE 10 MG/ML
20 INJECTION INTRAVENOUS ONCE AS NEEDED
Status: CANCELLED | OUTPATIENT
Start: 2025-01-06

## 2025-01-06 RX ORDER — HEPARIN SODIUM,PORCINE/PF 10 UNIT/ML
50 SYRINGE (ML) INTRAVENOUS AS NEEDED
Status: CANCELLED | OUTPATIENT
Start: 2025-01-06

## 2025-01-06 ASSESSMENT — PAIN SCALES - GENERAL: PAINLEVEL_OUTOF10: 8

## 2025-01-07 LAB — DAT-POLYSPECIFIC: NORMAL

## 2025-01-08 ENCOUNTER — INFUSION (OUTPATIENT)
Dept: HEMATOLOGY/ONCOLOGY | Facility: CLINIC | Age: 65
End: 2025-01-08
Payer: MEDICARE

## 2025-01-08 VITALS
OXYGEN SATURATION: 95 % | SYSTOLIC BLOOD PRESSURE: 141 MMHG | RESPIRATION RATE: 18 BRPM | HEART RATE: 92 BPM | WEIGHT: 303.13 LBS | DIASTOLIC BLOOD PRESSURE: 71 MMHG | TEMPERATURE: 98.4 F | BODY MASS INDEX: 46.48 KG/M2

## 2025-01-08 DIAGNOSIS — D75.81 MYELOFIBROSIS (MULTI): ICD-10-CM

## 2025-01-08 LAB
BLOOD EXPIRATION DATE: NORMAL
DISPENSE STATUS: NORMAL
PRODUCT BLOOD TYPE: 9500
PRODUCT CODE: NORMAL
UNIT ABO: NORMAL
UNIT NUMBER: NORMAL
UNIT RH: NORMAL
UNIT VOLUME: 350
XM INTEP: NORMAL

## 2025-01-08 PROCEDURE — 36430 TRANSFUSION BLD/BLD COMPNT: CPT

## 2025-01-08 PROCEDURE — 86902 BLOOD TYPE ANTIGEN DONOR EA: CPT

## 2025-01-08 PROCEDURE — P9040 RBC LEUKOREDUCED IRRADIATED: HCPCS

## 2025-01-08 RX ORDER — EPINEPHRINE 0.3 MG/.3ML
0.3 INJECTION SUBCUTANEOUS EVERY 5 MIN PRN
OUTPATIENT
Start: 2025-01-08

## 2025-01-08 RX ORDER — HEPARIN 100 UNIT/ML
500 SYRINGE INTRAVENOUS AS NEEDED
OUTPATIENT
Start: 2025-01-08

## 2025-01-08 RX ORDER — HEPARIN SODIUM,PORCINE/PF 10 UNIT/ML
50 SYRINGE (ML) INTRAVENOUS AS NEEDED
OUTPATIENT
Start: 2025-01-08

## 2025-01-08 RX ORDER — ALBUTEROL SULFATE 0.83 MG/ML
3 SOLUTION RESPIRATORY (INHALATION) AS NEEDED
Status: DISCONTINUED | OUTPATIENT
Start: 2025-01-08 | End: 2025-01-08 | Stop reason: HOSPADM

## 2025-01-08 RX ORDER — FAMOTIDINE 10 MG/ML
20 INJECTION INTRAVENOUS ONCE AS NEEDED
Status: DISCONTINUED | OUTPATIENT
Start: 2025-01-08 | End: 2025-01-08 | Stop reason: HOSPADM

## 2025-01-08 RX ORDER — DIPHENHYDRAMINE HYDROCHLORIDE 50 MG/ML
50 INJECTION INTRAMUSCULAR; INTRAVENOUS AS NEEDED
OUTPATIENT
Start: 2025-01-08

## 2025-01-08 RX ORDER — EPINEPHRINE 0.3 MG/.3ML
0.3 INJECTION SUBCUTANEOUS EVERY 5 MIN PRN
Status: DISCONTINUED | OUTPATIENT
Start: 2025-01-08 | End: 2025-01-08 | Stop reason: HOSPADM

## 2025-01-08 RX ORDER — FAMOTIDINE 10 MG/ML
20 INJECTION INTRAVENOUS ONCE AS NEEDED
OUTPATIENT
Start: 2025-01-08

## 2025-01-08 RX ORDER — DIPHENHYDRAMINE HYDROCHLORIDE 50 MG/ML
50 INJECTION INTRAMUSCULAR; INTRAVENOUS AS NEEDED
Status: DISCONTINUED | OUTPATIENT
Start: 2025-01-08 | End: 2025-01-08 | Stop reason: HOSPADM

## 2025-01-08 RX ORDER — ALBUTEROL SULFATE 0.83 MG/ML
3 SOLUTION RESPIRATORY (INHALATION) AS NEEDED
OUTPATIENT
Start: 2025-01-08

## 2025-01-08 ASSESSMENT — PAIN SCALES - GENERAL: PAINLEVEL_OUTOF10: 8

## 2025-01-09 DIAGNOSIS — I10 PRIMARY HYPERTENSION: ICD-10-CM

## 2025-01-09 LAB
BB ANTIBODY IDENTIFICATION: NORMAL
CASE #: NORMAL

## 2025-01-09 RX ORDER — LOSARTAN POTASSIUM AND HYDROCHLOROTHIAZIDE 12.5; 5 MG/1; MG/1
1 TABLET ORAL DAILY
Qty: 90 TABLET | Refills: 0 | Status: SHIPPED | OUTPATIENT
Start: 2025-01-09

## 2025-01-09 NOTE — TELEPHONE ENCOUNTER
Recent Visits  Date Type Provider Dept   11/22/24 Office Visit Mandeep Tucker, DO [facility]   11/05/24 Office Visit Mandeep Tucker, DO [facility]   09/13/24 Office Visit Mandeep Tucker, DO [facility]   Showing recent visits within past 180 days and meeting all other requirements  Future Appointments  No visits were found meeting these conditions.  Showing future appointments within next 90 days and meeting all other requirements

## 2025-01-10 ENCOUNTER — DOCUMENTATION (OUTPATIENT)
Dept: OTHER | Facility: HOSPITAL | Age: 65
End: 2025-01-10
Payer: MEDICARE

## 2025-01-10 ENCOUNTER — OFFICE VISIT (OUTPATIENT)
Dept: HEMATOLOGY/ONCOLOGY | Facility: HOSPITAL | Age: 65
End: 2025-01-10
Payer: MEDICARE

## 2025-01-10 VITALS
BODY MASS INDEX: 46.58 KG/M2 | OXYGEN SATURATION: 96 % | TEMPERATURE: 98.4 F | DIASTOLIC BLOOD PRESSURE: 73 MMHG | RESPIRATION RATE: 18 BRPM | SYSTOLIC BLOOD PRESSURE: 151 MMHG | HEART RATE: 86 BPM | WEIGHT: 303.79 LBS

## 2025-01-10 DIAGNOSIS — Z76.82 STEM CELL TRANSPLANT CANDIDATE: ICD-10-CM

## 2025-01-10 DIAGNOSIS — D75.81 MYELOFIBROSIS (MULTI): ICD-10-CM

## 2025-01-10 PROCEDURE — 99215 OFFICE O/P EST HI 40 MIN: CPT | Performed by: STUDENT IN AN ORGANIZED HEALTH CARE EDUCATION/TRAINING PROGRAM

## 2025-01-10 PROCEDURE — 1159F MED LIST DOCD IN RCRD: CPT | Performed by: STUDENT IN AN ORGANIZED HEALTH CARE EDUCATION/TRAINING PROGRAM

## 2025-01-10 PROCEDURE — 1125F AMNT PAIN NOTED PAIN PRSNT: CPT | Performed by: STUDENT IN AN ORGANIZED HEALTH CARE EDUCATION/TRAINING PROGRAM

## 2025-01-10 PROCEDURE — 1123F ACP DISCUSS/DSCN MKR DOCD: CPT | Performed by: STUDENT IN AN ORGANIZED HEALTH CARE EDUCATION/TRAINING PROGRAM

## 2025-01-10 PROCEDURE — 3078F DIAST BP <80 MM HG: CPT | Performed by: STUDENT IN AN ORGANIZED HEALTH CARE EDUCATION/TRAINING PROGRAM

## 2025-01-10 PROCEDURE — 81371 HLA I & II TYPE VERIFY LR: CPT | Mod: OUT | Performed by: INTERNAL MEDICINE

## 2025-01-10 PROCEDURE — 3077F SYST BP >= 140 MM HG: CPT | Performed by: STUDENT IN AN ORGANIZED HEALTH CARE EDUCATION/TRAINING PROGRAM

## 2025-01-10 SDOH — ECONOMIC STABILITY: INCOME INSECURITY: IN THE PAST 12 MONTHS HAS THE ELECTRIC, GAS, OIL, OR WATER COMPANY THREATENED TO SHUT OFF SERVICES IN YOUR HOME?: NO

## 2025-01-10 SDOH — SOCIAL STABILITY: SOCIAL INSECURITY: WITHIN THE LAST YEAR, HAVE YOU BEEN AFRAID OF YOUR PARTNER OR EX-PARTNER?: NO

## 2025-01-10 SDOH — ECONOMIC STABILITY: FOOD INSECURITY: HOW HARD IS IT FOR YOU TO PAY FOR THE VERY BASICS LIKE FOOD, HOUSING, MEDICAL CARE, AND HEATING?: HARD

## 2025-01-10 SDOH — ECONOMIC STABILITY: FOOD INSECURITY: WITHIN THE PAST 12 MONTHS, THE FOOD YOU BOUGHT JUST DIDN'T LAST AND YOU DIDN'T HAVE MONEY TO GET MORE.: SOMETIMES TRUE

## 2025-01-10 SDOH — HEALTH STABILITY: MENTAL HEALTH: HOW OFTEN DO YOU HAVE SIX OR MORE DRINKS ON ONE OCCASION?: NEVER

## 2025-01-10 SDOH — HEALTH STABILITY: MENTAL HEALTH: HOW MANY DRINKS CONTAINING ALCOHOL DO YOU HAVE ON A TYPICAL DAY WHEN YOU ARE DRINKING?: PATIENT DOES NOT DRINK

## 2025-01-10 SDOH — SOCIAL STABILITY: SOCIAL INSECURITY: WITHIN THE LAST YEAR, HAVE YOU BEEN HUMILIATED OR EMOTIONALLY ABUSED IN OTHER WAYS BY YOUR PARTNER OR EX-PARTNER?: NO

## 2025-01-10 SDOH — ECONOMIC STABILITY: TRANSPORTATION INSECURITY: IN THE PAST 12 MONTHS, HAS LACK OF TRANSPORTATION KEPT YOU FROM MEDICAL APPOINTMENTS OR FROM GETTING MEDICATIONS?: YES

## 2025-01-10 SDOH — SOCIAL STABILITY: SOCIAL NETWORK
DO YOU BELONG TO ANY CLUBS OR ORGANIZATIONS SUCH AS CHURCH GROUPS, UNIONS, FRATERNAL OR ATHLETIC GROUPS, OR SCHOOL GROUPS?: YES

## 2025-01-10 SDOH — SOCIAL STABILITY: SOCIAL INSECURITY: ARE YOU MARRIED, WIDOWED, DIVORCED, SEPARATED, NEVER MARRIED, OR LIVING WITH A PARTNER?: LIVING WITH PARTNER

## 2025-01-10 SDOH — HEALTH STABILITY: MENTAL HEALTH: HOW OFTEN DO YOU HAVE A DRINK CONTAINING ALCOHOL?: NEVER

## 2025-01-10 SDOH — SOCIAL STABILITY: SOCIAL NETWORK: HOW OFTEN DO YOU ATTEND CHURCH OR RELIGIOUS SERVICES?: MORE THAN 4 TIMES PER YEAR

## 2025-01-10 SDOH — HEALTH STABILITY: MENTAL HEALTH
DO YOU FEEL STRESS - TENSE, RESTLESS, NERVOUS, OR ANXIOUS, OR UNABLE TO SLEEP AT NIGHT BECAUSE YOUR MIND IS TROUBLED ALL THE TIME - THESE DAYS?: VERY MUCH

## 2025-01-10 SDOH — HEALTH STABILITY: PHYSICAL HEALTH
HOW OFTEN DO YOU NEED TO HAVE SOMEONE HELP YOU WHEN YOU READ INSTRUCTIONS, PAMPHLETS, OR OTHER WRITTEN MATERIAL FROM YOUR DOCTOR OR PHARMACY?: SOMETIMES

## 2025-01-10 SDOH — ECONOMIC STABILITY: HOUSING INSECURITY: IN THE PAST 12 MONTHS, HOW MANY TIMES HAVE YOU MOVED WHERE YOU WERE LIVING?: 2

## 2025-01-10 SDOH — ECONOMIC STABILITY: FOOD INSECURITY
WITHIN THE PAST 12 MONTHS, YOU WORRIED THAT YOUR FOOD WOULD RUN OUT BEFORE YOU GOT THE MONEY TO BUY MORE.: SOMETIMES TRUE

## 2025-01-10 SDOH — ECONOMIC STABILITY: HOUSING INSECURITY: AT ANY TIME IN THE PAST 12 MONTHS, WERE YOU HOMELESS OR LIVING IN A SHELTER (INCLUDING NOW)?: NO

## 2025-01-10 SDOH — SOCIAL STABILITY: SOCIAL NETWORK: HOW OFTEN DO YOU ATTEND MEETINGS OF THE CLUBS OR ORGANIZATIONS YOU BELONG TO?: 1 TO 4 TIMES PER YEAR

## 2025-01-10 SDOH — SOCIAL STABILITY: SOCIAL NETWORK: HOW OFTEN DO YOU GET TOGETHER WITH FRIENDS OR RELATIVES?: ONCE A WEEK

## 2025-01-10 SDOH — SOCIAL STABILITY: SOCIAL NETWORK: IN A TYPICAL WEEK, HOW MANY TIMES DO YOU TALK ON THE PHONE WITH FAMILY, FRIENDS, OR NEIGHBORS?: THREE TIMES A WEEK

## 2025-01-10 SDOH — ECONOMIC STABILITY: HOUSING INSECURITY: IN THE LAST 12 MONTHS, WAS THERE A TIME WHEN YOU WERE NOT ABLE TO PAY THE MORTGAGE OR RENT ON TIME?: PATIENT DECLINED

## 2025-01-10 SDOH — HEALTH STABILITY: PHYSICAL HEALTH
ON AVERAGE, HOW MANY DAYS PER WEEK DO YOU ENGAGE IN MODERATE TO STRENUOUS EXERCISE (LIKE A BRISK WALK)?: PATIENT DECLINED

## 2025-01-10 SDOH — HEALTH STABILITY: PHYSICAL HEALTH: ON AVERAGE, HOW MANY MINUTES DO YOU ENGAGE IN EXERCISE AT THIS LEVEL?: PATIENT DECLINED

## 2025-01-10 ASSESSMENT — NCCN CANCER DISTRESS MANAGEMENT
NCCN PHYSICAL CONCERNS: 3
NCCN EMOTIONAL CONCERNS: 4
NCCN SPIRITUAL CONCERNS: 3
NCCN PRACTICAL CONCERNS: 12
NCCN SOCIAL CONCERNS: 4
NCCN SOCIAL CONCERNS: 1
NCCN EMOTIONAL CONCERNS: 2
NCCN EMOTIONAL CONCERNS: 8
NCCN PRACTICAL CONCERNS: 8
NCCN EMOTIONAL CONCERNS: 7
NCCN PHYSICAL CONCERNS: 9
NCCN PHYSICAL CONCERNS: 6
NCCN SPIRITUAL CONCERNS: 1
NCCN PRACTICAL CONCERNS: 7
NCCN PRACTICAL CONCERNS: 1
NCCN PHYSICAL CONCERNS: 7
NCCN PRACTICAL CONCERNS: 6
NCCN PRACTICAL CONCERNS: 11
NCCN SOCIAL CONCERNS: 2
NCCN PHYSICAL CONCERNS: 1
NCCN SOCIAL CONCERNS: 3
NCCN EMOTIONAL CONCERNS: 1
NCCN EMOTIONAL CONCERNS: 3

## 2025-01-10 ASSESSMENT — LIFESTYLE VARIABLES
SKIP TO QUESTIONS 9-10: 1
AUDIT-C TOTAL SCORE: 0

## 2025-01-10 ASSESSMENT — ACTIVITIES OF DAILY LIVING (ADL): LACK_OF_TRANSPORTATION: YES

## 2025-01-10 ASSESSMENT — PAIN SCALES - GENERAL: PAINLEVEL_OUTOF10: 8

## 2025-01-10 ASSESSMENT — PATIENT HEALTH QUESTIONNAIRE - PHQ9
2. FEELING DOWN, DEPRESSED OR HOPELESS: SEVERAL DAYS
1. LITTLE INTEREST OR PLEASURE IN DOING THINGS: MORE THAN HALF THE DAYS
SUM OF ALL RESPONSES TO PHQ9 QUESTIONS 1 AND 2: 3
10. IF YOU CHECKED OFF ANY PROBLEMS, HOW DIFFICULT HAVE THESE PROBLEMS MADE IT FOR YOU TO DO YOUR WORK, TAKE CARE OF THINGS AT HOME, OR GET ALONG WITH OTHER PEOPLE: VERY DIFFICULT

## 2025-01-10 NOTE — LETTER
January 13, 2025     Mirza Massey MD  [id number]  [facility] [id number]    Patient: Jaycee Harp   YOB: 1960   Date of Visit: 1/10/2025       Dear Dr. Mirza Massey MD:    Thank you for referring Jaycee Harp to me for evaluation. Below are my notes for this consultation.  If you have questions, please do not hesitate to call me. I look forward to following your patient along with you.       Sincerely,     Beth Vargas MD PhD      CC: No Recipients  ______________________________________________________________________________________    Patient ID: Jaycee Harp is a 64 y.o. male.  Referring Physician: No referring provider defined for this encounter.  Primary Care Provider: Mandeep Tucker DO    Oncology History Overview Note   Prognosis by data available at the time of diagnosis:  - GIPSS = 4 (int-2, median os 4.6y)  - MIPSS = 11 (very high risk, median os 1.8y)  - DIPSS = 4 (int-2, median os   - DIPSS Plus = 4 (high risk)  - MTSS = 7 (5y os 22%)    Note:  no cytogenetic results (karyotype culture failure); could increase risk but not decrease it     Myelofibrosis (Multi)   11/28/2024 Initial Diagnosis    Myeloproliferative Neoplasm - favor Primary Myelofibrosis  Diagnosis:     BONE MARROW CLOT WITH ASPIRATE AND CORE WITH TOUCH PREP, LEFT ILIAC CREST:    --HYPERCELLULAR BONE MARROW (95%) WITH ATYPICAL MEGAKARYOCYTES AND INCREASED FIBROSIS CONSISTENT WITH MYELOPROLIFERATIVE NEOPLASM, SEE NOTE.    NOTE: Sections show increased and morphologically atypical megakaryocytes frequently arranged in clusters in a slightly hypercellular bone marrow. Blasts are not increased by morphology or CD34 immunohistochemical stain. There is increaased reticulin fibrosis (MF grade 2). There is not definitive morphologic evidence of dysplasia. 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  "The overall findings are most consistent with a myeloproliferative neoplasm, favor primary myelofibrosis. Chromosomal analysis and FISH studies will be attempted and results will be reported separately. Correlation with clinical findings is recommended  Molecular: DNMT3A p.R736H ([id number] c.2207G>A)  U2AF1 p.S34F ([id number] c.101C>T)  Karyotype/microarray: karyotype culture failure; microarray with Allelic imbalances (copy neutral loss of heterozygosity) were detected including 2p25.3p14(0-68,210,872).      2024 -  Chemotherapy    Treatment:   I. Momelotinib 200mg/day -  Month 1: started 2024 -   100mg             Subjective     Mr. Harp presents today for allogeneic stem cell transplant evaluation for myelofibrosis.  He is accompanied by his significant other and her cousin.    He is doing OK overall.  He is taking momelotinib prescribed by Dr. Massey (started 4.5 weeks ago).  So far counts are stable, still requiring transfusion support.  He states he feels \"great\" after transfusions.  He has on oxygen, which he states is due to his pulmonary nodule (currently undergoing workup) and symptomatic anemia.        Review of Systems   Constitutional:  Positive for unexpected weight change.   Respiratory:  Positive for shortness of breath.    All other systems reviewed and are negative.       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 His smoking use included cigarettes. He started smoking about 3 months ago. He has been exposed to tobacco smoke. He has quit using smokeless tobacco.  He  reports that he does not currently use alcohol after a past usage of about 24.0 standard drinks of alcohol per week.  He  reports current drug use. Drug: Marijuana.    Physical Exam  Constitutional:       Appearance: He is overweight.   Pulmonary:      Comments: Appears short of breath though not in distress    Labs from 1.6.25 reviewed:    WBC 1.5 (ANC 0.20), Hgb 6.7, plts 12  Cr 1.11, Ca 8.8  AST 43, ALT 84    PFTs (11.4.24):  FVC 58% pre-bronchodilator, 62% post-bronchodilator  FEV1 55% pre-bronchodilator, 60% post-bronchodilator  DLCO (corrected):  55%      Performance Status:  Symptomatic; in bed <50% of the day    Assessment/Plan     We discussed the following:    Basic steps of allogeneic stem cell transplant, including donor collection, conditioning regimen, stem cell infusion, and post-transplant complications.  Patients need to meet certain organ function criteria to proceed with transplant.  Currently, his PFTs are borderline and he is undergoing workup for a pulmonary nodule (need to r/o malignancy).  We would need to assess his cardiac and liver function (has risk factors for cirrhosis) and IDMs (as he has h/o TB exposure).  Prognosis without transplant (by prediction models) is likely to be a few years.  Due to his high risk disease, chance of long-term cure with transplant (if he is a candidate) is likely to be only about 20%.  Median overall survival would likely be comparable to not having the transplant at all.  Response to treatment may guide decision-making (may be more likely to proceed to transplant if MF is refractory.    Regarding donor status, pt has no 8/8 MUDs or 7/8 MMUDs in the registry,  and only 4/8 cords.  Family donor typing is recommended (children, siblings).  Even though pt suspects his brother with polio would not be a candidate for stem cell donation, it is possible that he may be eligible.    It is reasonable to proceed with family typing, cardiac and liver evaluations, and complete workup of pulmonary nodule.  Will discuss expected prognosis with Dr. Massey and transplant group to make a final recommendation of whether or not to proceed.     Pt also requests second opinion to discuss his treatment and prognosis; will refer to Dr. Beltrán.    Beth Vargas MD PhD

## 2025-01-10 NOTE — PROGRESS NOTES
RN sent message to  about patient's distress screening of 9/10 to reach out to patient next week. Pt noted issues with feeling down/ anxious and relationship and financial issues. MD aware

## 2025-01-10 NOTE — PROGRESS NOTES
Patient Education  Learner: family, patient, and significant other  Educated on: 1/11/25 Allo  Readiness: acceptance  Preferred learning method: listening  Method used: explanation, handout, and video  Response: needs reinforcement  Barriers: anxiety  Preferred language: English    I met with Jaycee, his SO Elza and cousin today for allogenic stem cell transplant education. We discussed the general concept of transplant including chemotherapy administration and cell infusion. We reviewed the workup process including organ function testing and laboratory testing, required for MD and financial clearance to proceed with stem cell collection and transplant. We discussed hospital admission for transplant and that Jaycee will remain in the hospital for 4-6 weeks. We do not yet have a donor or preparative chosen for transplant if pt decides to proceed. Administration and potential side effects of HD chemo +/- radiation were reviewed and patient will be provided Lexicomp material specific to this medication. We discussed neutropenia, anemia, thrombocytopenia and the potential complications associated with these events. Infection prevention measures such as strict hand washing, low pathogen diet, daily showering/CHG bathing and frequent walking were reviewed.  Pt was provided Allo binder, Pauline allo video link, Allo education checklist and PI for CVC, CHG and How to contact your Dr. Patient was provided with patient information sheets on transplant related topics. 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 GVHD booklet provided. We discussed the goals of discharge and the need for a caregiver and someone to accompany him to post transplant visits in the [facility], which will occur 2-3 times per week, at minimum.  We discussed the importance of having a reliable caregiver post-transplant to drive patient to appointments, help with medication adherence, and assist with ADLS such as cooking and  cleaning. The patient verbalized understanding of these measures and Elza states that she will be the primary caregiver. Patient will be given copies of all applicable consents related to upcoming therapy and transplant, if he proceeds to transplant, so they can review prior to signing with Dr. Vargas Attending. He is aware SW will reach out if proceeding to transplant. My contact information was provided.

## 2025-01-13 ENCOUNTER — TELEPHONE (OUTPATIENT)
Dept: HEMATOLOGY/ONCOLOGY | Facility: CLINIC | Age: 65
End: 2025-01-13
Payer: MEDICAID

## 2025-01-13 ENCOUNTER — INFUSION (OUTPATIENT)
Dept: HEMATOLOGY/ONCOLOGY | Facility: CLINIC | Age: 65
End: 2025-01-13
Payer: MEDICARE

## 2025-01-13 VITALS
DIASTOLIC BLOOD PRESSURE: 71 MMHG | TEMPERATURE: 97.5 F | OXYGEN SATURATION: 95 % | WEIGHT: 308.86 LBS | HEART RATE: 90 BPM | BODY MASS INDEX: 47.36 KG/M2 | RESPIRATION RATE: 18 BRPM | SYSTOLIC BLOOD PRESSURE: 154 MMHG

## 2025-01-13 DIAGNOSIS — D75.81 MYELOFIBROSIS (MULTI): ICD-10-CM

## 2025-01-13 DIAGNOSIS — D75.81 MYELOFIBROSIS (MULTI): Primary | ICD-10-CM

## 2025-01-13 LAB
ABO GROUP (TYPE) IN BLOOD: NORMAL
ANTIBODY SCREEN: NORMAL
BASOPHILS # BLD AUTO: 0 X10*3/UL (ref 0–0.1)
BASOPHILS NFR BLD AUTO: 0 %
DACRYOCYTES BLD QL SMEAR: NORMAL
EOSINOPHIL # BLD AUTO: 0.01 X10*3/UL (ref 0–0.7)
EOSINOPHIL NFR BLD AUTO: 0.9 %
ERYTHROCYTE [DISTWIDTH] IN BLOOD BY AUTOMATED COUNT: 16.8 % (ref 11.5–14.5)
HCT VFR BLD AUTO: 18.7 % (ref 41–52)
HGB BLD-MCNC: 6.4 G/DL (ref 13.5–17.5)
IMM GRANULOCYTES # BLD AUTO: 0.04 X10*3/UL (ref 0–0.7)
IMM GRANULOCYTES NFR BLD AUTO: 3.5 % (ref 0–0.9)
LYMPHOCYTES # BLD AUTO: 0.97 X10*3/UL (ref 1.2–4.8)
LYMPHOCYTES NFR BLD AUTO: 84.3 %
MCH RBC QN AUTO: 31.8 PG (ref 26–34)
MCHC RBC AUTO-ENTMCNC: 34.2 G/DL (ref 32–36)
MCV RBC AUTO: 93 FL (ref 80–100)
MONOCYTES # BLD AUTO: 0.06 X10*3/UL (ref 0.1–1)
MONOCYTES NFR BLD AUTO: 5.2 %
NEUTROPHILS # BLD AUTO: 0.07 X10*3/UL (ref 1.2–7.7)
NEUTROPHILS NFR BLD AUTO: 6.1 %
NRBC BLD-RTO: ABNORMAL /100{WBCS}
OVALOCYTES BLD QL SMEAR: NORMAL
PLATELET # BLD AUTO: 13 X10*3/UL (ref 150–450)
POLYCHROMASIA BLD QL SMEAR: NORMAL
RBC # BLD AUTO: 2.01 X10*6/UL (ref 4.5–5.9)
RBC MORPH BLD: NORMAL
RH FACTOR (ANTIGEN D): NORMAL
WBC # BLD AUTO: 1.2 X10*3/UL (ref 4.4–11.3)

## 2025-01-13 PROCEDURE — 86860 RBC ANTIBODY ELUTION: CPT

## 2025-01-13 PROCEDURE — 86900 BLOOD TYPING SEROLOGIC ABO: CPT

## 2025-01-13 PROCEDURE — 86901 BLOOD TYPING SEROLOGIC RH(D): CPT

## 2025-01-13 PROCEDURE — 86870 RBC ANTIBODY IDENTIFICATION: CPT

## 2025-01-13 PROCEDURE — 86978 RBC PRETREATMENT SERUM: CPT

## 2025-01-13 PROCEDURE — 86880 COOMBS TEST DIRECT: CPT

## 2025-01-13 PROCEDURE — 36415 COLL VENOUS BLD VENIPUNCTURE: CPT

## 2025-01-13 PROCEDURE — 86922 COMPATIBILITY TEST ANTIGLOB: CPT

## 2025-01-13 PROCEDURE — 85025 COMPLETE CBC W/AUTO DIFF WBC: CPT

## 2025-01-13 PROCEDURE — 86885 COOMBS TEST INDIRECT QUAL: CPT

## 2025-01-13 RX ORDER — EPINEPHRINE 0.3 MG/.3ML
0.3 INJECTION SUBCUTANEOUS EVERY 5 MIN PRN
Status: CANCELLED | OUTPATIENT
Start: 2025-01-13

## 2025-01-13 RX ORDER — HEPARIN 100 UNIT/ML
500 SYRINGE INTRAVENOUS AS NEEDED
OUTPATIENT
Start: 2025-01-13

## 2025-01-13 RX ORDER — FAMOTIDINE 10 MG/ML
20 INJECTION INTRAVENOUS ONCE AS NEEDED
Status: CANCELLED | OUTPATIENT
Start: 2025-01-13

## 2025-01-13 RX ORDER — HEPARIN SODIUM,PORCINE/PF 10 UNIT/ML
50 SYRINGE (ML) INTRAVENOUS AS NEEDED
OUTPATIENT
Start: 2025-01-13

## 2025-01-13 RX ORDER — DIPHENHYDRAMINE HYDROCHLORIDE 50 MG/ML
50 INJECTION INTRAMUSCULAR; INTRAVENOUS AS NEEDED
Status: CANCELLED | OUTPATIENT
Start: 2025-01-13

## 2025-01-13 RX ORDER — ALBUTEROL SULFATE 0.83 MG/ML
3 SOLUTION RESPIRATORY (INHALATION) AS NEEDED
Status: CANCELLED | OUTPATIENT
Start: 2025-01-13

## 2025-01-13 RX ORDER — ACYCLOVIR 400 MG/1
400 TABLET ORAL 2 TIMES DAILY
Qty: 180 TABLET | Refills: 3 | Status: SHIPPED | OUTPATIENT
Start: 2025-01-13 | End: 2026-01-08

## 2025-01-13 ASSESSMENT — ENCOUNTER SYMPTOMS
UNEXPECTED WEIGHT CHANGE: 1
SHORTNESS OF BREATH: 1

## 2025-01-13 ASSESSMENT — PAIN SCALES - GENERAL: PAINLEVEL_OUTOF10: 8

## 2025-01-13 NOTE — TELEPHONE ENCOUNTER
Call transferred to me from .  Pt apparently called unable to keep 1130 infusion time, requesting apt tomorrow or later today.  I explained that charge RN confirmed no spots for tomorrow and that the 1130 apt was latest the pt could come today.  I told him this was to allow for plt if he met criteria for plt.  He tells me he is having car trouble but will look for alternate transportation.  He asked if he could come earlier and I told him that was fine, but there was little likelihood of him being taken early for infusion apt.  I explained that pt were assigned time slots/apt.  He voiced understanding.

## 2025-01-14 LAB
BB ANTIBODY IDENTIFICATION: NORMAL
CASE #: NORMAL

## 2025-01-14 RX ORDER — EPINEPHRINE 0.3 MG/.3ML
0.3 INJECTION SUBCUTANEOUS EVERY 5 MIN PRN
OUTPATIENT
Start: 2025-01-14

## 2025-01-14 RX ORDER — FAMOTIDINE 10 MG/ML
20 INJECTION INTRAVENOUS ONCE AS NEEDED
OUTPATIENT
Start: 2025-01-14

## 2025-01-14 RX ORDER — ALBUTEROL SULFATE 0.83 MG/ML
3 SOLUTION RESPIRATORY (INHALATION) AS NEEDED
OUTPATIENT
Start: 2025-01-14

## 2025-01-14 RX ORDER — DIPHENHYDRAMINE HYDROCHLORIDE 50 MG/ML
50 INJECTION INTRAMUSCULAR; INTRAVENOUS AS NEEDED
OUTPATIENT
Start: 2025-01-14

## 2025-01-15 ENCOUNTER — SOCIAL WORK (OUTPATIENT)
Dept: HEMATOLOGY/ONCOLOGY | Facility: CLINIC | Age: 65
End: 2025-01-15

## 2025-01-15 ENCOUNTER — INFUSION (OUTPATIENT)
Dept: HEMATOLOGY/ONCOLOGY | Facility: CLINIC | Age: 65
End: 2025-01-15
Payer: MEDICAID

## 2025-01-15 ENCOUNTER — SOCIAL WORK (OUTPATIENT)
Dept: HEMATOLOGY/ONCOLOGY | Facility: HOSPITAL | Age: 65
End: 2025-01-15

## 2025-01-15 VITALS
DIASTOLIC BLOOD PRESSURE: 60 MMHG | TEMPERATURE: 97.7 F | RESPIRATION RATE: 16 BRPM | BODY MASS INDEX: 48.03 KG/M2 | WEIGHT: 313.27 LBS | OXYGEN SATURATION: 98 % | SYSTOLIC BLOOD PRESSURE: 138 MMHG | HEART RATE: 84 BPM

## 2025-01-15 DIAGNOSIS — J43.9 PULMONARY EMPHYSEMA, UNSPECIFIED EMPHYSEMA TYPE (MULTI): ICD-10-CM

## 2025-01-15 DIAGNOSIS — D75.81 MYELOFIBROSIS (MULTI): ICD-10-CM

## 2025-01-15 PROCEDURE — 36430 TRANSFUSION BLD/BLD COMPNT: CPT

## 2025-01-15 PROCEDURE — P9040 RBC LEUKOREDUCED IRRADIATED: HCPCS

## 2025-01-15 ASSESSMENT — PAIN SCALES - GENERAL: PAINLEVEL_OUTOF10: 8

## 2025-01-15 NOTE — PROGRESS NOTES
SW attempted to reach patient on this day to introduce self/services as patient recently had NPV. He also noted several areas of high distress on the distress Screen; SW to address this as well.  Upon calling patient's primary number listed in chart, received voicemail. Left voicemail requesting callback. Awaiting callback.

## 2025-01-15 NOTE — PROGRESS NOTES
Social work continues to follow this patient for ongoing assessment and support. I met with the patient today in Luis June [facility]'s absence as he was receiving a blood transfusion. He remains very pleasant and easily engaged in conversation. He requested ordering a wheelchair for use at home. He also inquired if a motorized wheelchair would be covered by his insurance; I explained that this is a possibility, however requires additional discussion/paperwork and PT assessment. I further explained that motorized wheelchairs are typically ordered by primary care providers and encouraged him to discuss further with his PCP. I explained that we can order a standard wheelchair and he is agreeable. He also expressed concern that his portable oxygen concentrator, provided by [employer] (ph: [phone]; fax: [phone]), only lasts about an hour before it dies. I explained that I will call [facility] and inquire; I explained that, if they determine something is wrong with the machine, they may either give him a replacement or send a tech out to fix it. I further explained that, should he need different equipment, he will need to follow-up with Pulmonary. He expressed understanding. He is aware that I will discuss his interest in getting a standard wheelchair with Dr. Beebe and orders/documentation will be sent to [facility] when available. He expressed appreciation. Following our conversation, I reached out to [facility] and left a message, explaining the patient's portable concentrator is only lasting approximately one hour and asked what can be done to assist him. I await a call back at this time. [facility] was updated regarding the above. Social work will remain available to assist this patient.    Marie Brownlee, [facility], LISW-S       Addendum 1/15/2025 1507 -- Documentation and order for wheelchair received from Dr. Beebe and faxed to [facility] at the number above. Fax success notice received. Social work will remain available to  assist this patient.    Marie Brownlee, ANAA, CONNORW-S

## 2025-01-17 ENCOUNTER — TELEPHONE (OUTPATIENT)
Dept: HEMATOLOGY/ONCOLOGY | Facility: CLINIC | Age: 65
End: 2025-01-17
Payer: MEDICAID

## 2025-01-17 NOTE — TELEPHONE ENCOUNTER
2nd attempt to contact patient. Left message for the patient to call the office back to discuss further.

## 2025-01-17 NOTE — TELEPHONE ENCOUNTER
"Spoke with the patient. States he could not start his car today. States he feels good today. States he will come on Monday for his lab work. Pt states \"I know this is a necessity but I am getting worn out.\" Asking that Dr. Beebe's appointment be added to one of the days he is already scheduled. He will see it on his [employer] brooke and come.     Pt is asking for someone to assist with transportation. Explained I would forward this message to the  to see if there is any assistance available to the patient.   "

## 2025-01-20 ENCOUNTER — INFUSION (OUTPATIENT)
Dept: HEMATOLOGY/ONCOLOGY | Facility: CLINIC | Age: 65
End: 2025-01-20
Payer: MEDICARE

## 2025-01-20 DIAGNOSIS — D75.81 MYELOFIBROSIS (MULTI): ICD-10-CM

## 2025-01-20 DIAGNOSIS — Z76.82 STEM CELL TRANSPLANT CANDIDATE: ICD-10-CM

## 2025-01-20 LAB
ABO GROUP (TYPE) IN BLOOD: NORMAL
ANTIBODY SCREEN: NORMAL
BASO STIPL BLD QL SMEAR: PRESENT
BASOPHILS # BLD AUTO: 0 X10*3/UL (ref 0–0.1)
BASOPHILS NFR BLD AUTO: 0 %
BLOOD EXPIRATION DATE: NORMAL
DACRYOCYTES BLD QL SMEAR: NORMAL
DISPENSE STATUS: NORMAL
EOSINOPHIL # BLD AUTO: 0.01 X10*3/UL (ref 0–0.7)
EOSINOPHIL NFR BLD AUTO: 0.7 %
ERYTHROCYTE [DISTWIDTH] IN BLOOD BY AUTOMATED COUNT: 15.9 % (ref 11.5–14.5)
HCT VFR BLD AUTO: 19.2 % (ref 41–52)
HGB BLD-MCNC: 6.6 G/DL (ref 13.5–17.5)
IMM GRANULOCYTES # BLD AUTO: 0 X10*3/UL (ref 0–0.7)
IMM GRANULOCYTES NFR BLD AUTO: 0 % (ref 0–0.9)
LYMPHOCYTES # BLD AUTO: 1.2 X10*3/UL (ref 1.2–4.8)
LYMPHOCYTES NFR BLD AUTO: 83.9 %
MCH RBC QN AUTO: 31.7 PG (ref 26–34)
MCHC RBC AUTO-ENTMCNC: 34.4 G/DL (ref 32–36)
MCV RBC AUTO: 92 FL (ref 80–100)
MONOCYTES # BLD AUTO: 0.09 X10*3/UL (ref 0.1–1)
MONOCYTES NFR BLD AUTO: 6.3 %
NEUTROPHILS # BLD AUTO: 0.13 X10*3/UL (ref 1.2–7.7)
NEUTROPHILS NFR BLD AUTO: 9.1 %
NRBC BLD-RTO: ABNORMAL /100{WBCS}
OVALOCYTES BLD QL SMEAR: NORMAL
PLATELET # BLD AUTO: 9 X10*3/UL (ref 150–450)
POLYCHROMASIA BLD QL SMEAR: NORMAL
PRODUCT BLOOD TYPE: 1700
PRODUCT CODE: NORMAL
RBC # BLD AUTO: 2.08 X10*6/UL (ref 4.5–5.9)
RBC MORPH BLD: NORMAL
RH FACTOR (ANTIGEN D): NORMAL
UNIT ABO: NORMAL
UNIT NUMBER: NORMAL
UNIT RH: NORMAL
UNIT VOLUME: 195
WBC # BLD AUTO: 1.4 X10*3/UL (ref 4.4–11.3)

## 2025-01-20 PROCEDURE — 86978 RBC PRETREATMENT SERUM: CPT

## 2025-01-20 PROCEDURE — 86860 RBC ANTIBODY ELUTION: CPT

## 2025-01-20 PROCEDURE — 86885 COOMBS TEST INDIRECT QUAL: CPT

## 2025-01-20 PROCEDURE — P9073 PLATELETS PHERESIS PATH REDU: HCPCS

## 2025-01-20 PROCEDURE — 86900 BLOOD TYPING SEROLOGIC ABO: CPT

## 2025-01-20 PROCEDURE — 86870 RBC ANTIBODY IDENTIFICATION: CPT

## 2025-01-20 PROCEDURE — 86901 BLOOD TYPING SEROLOGIC RH(D): CPT

## 2025-01-20 PROCEDURE — 85025 COMPLETE CBC W/AUTO DIFF WBC: CPT

## 2025-01-20 PROCEDURE — 86922 COMPATIBILITY TEST ANTIGLOB: CPT

## 2025-01-20 PROCEDURE — 86880 COOMBS TEST DIRECT: CPT

## 2025-01-20 PROCEDURE — 36430 TRANSFUSION BLD/BLD COMPNT: CPT

## 2025-01-20 RX ORDER — ALBUTEROL SULFATE 0.83 MG/ML
3 SOLUTION RESPIRATORY (INHALATION) AS NEEDED
Status: CANCELLED | OUTPATIENT
Start: 2025-01-20

## 2025-01-20 RX ORDER — EPINEPHRINE 0.3 MG/.3ML
0.3 INJECTION SUBCUTANEOUS EVERY 5 MIN PRN
Status: CANCELLED | OUTPATIENT
Start: 2025-01-20

## 2025-01-20 RX ORDER — DIPHENHYDRAMINE HYDROCHLORIDE 50 MG/ML
50 INJECTION INTRAMUSCULAR; INTRAVENOUS AS NEEDED
Status: CANCELLED | OUTPATIENT
Start: 2025-01-20

## 2025-01-20 RX ORDER — FAMOTIDINE 10 MG/ML
20 INJECTION INTRAVENOUS ONCE AS NEEDED
Status: CANCELLED | OUTPATIENT
Start: 2025-01-20

## 2025-01-20 RX ORDER — HEPARIN SODIUM,PORCINE/PF 10 UNIT/ML
50 SYRINGE (ML) INTRAVENOUS AS NEEDED
Status: CANCELLED | OUTPATIENT
Start: 2025-01-20

## 2025-01-20 RX ORDER — HEPARIN 100 UNIT/ML
500 SYRINGE INTRAVENOUS AS NEEDED
Status: CANCELLED | OUTPATIENT
Start: 2025-01-20

## 2025-01-20 ASSESSMENT — PAIN SCALES - GENERAL: PAINLEVEL_OUTOF10: 6

## 2025-01-21 VITALS
WEIGHT: 304.45 LBS | DIASTOLIC BLOOD PRESSURE: 67 MMHG | SYSTOLIC BLOOD PRESSURE: 118 MMHG | RESPIRATION RATE: 18 BRPM | OXYGEN SATURATION: 99 % | BODY MASS INDEX: 46.68 KG/M2 | TEMPERATURE: 99.1 F | HEART RATE: 83 BPM

## 2025-01-21 RX ORDER — ALBUTEROL SULFATE 0.83 MG/ML
3 SOLUTION RESPIRATORY (INHALATION) AS NEEDED
OUTPATIENT
Start: 2025-01-21

## 2025-01-21 RX ORDER — DIPHENHYDRAMINE HYDROCHLORIDE 50 MG/ML
50 INJECTION INTRAMUSCULAR; INTRAVENOUS AS NEEDED
OUTPATIENT
Start: 2025-01-21

## 2025-01-21 RX ORDER — FAMOTIDINE 10 MG/ML
20 INJECTION INTRAVENOUS ONCE AS NEEDED
OUTPATIENT
Start: 2025-01-21

## 2025-01-21 RX ORDER — EPINEPHRINE 0.3 MG/.3ML
0.3 INJECTION SUBCUTANEOUS EVERY 5 MIN PRN
OUTPATIENT
Start: 2025-01-21

## 2025-01-22 ENCOUNTER — INFUSION (OUTPATIENT)
Dept: HEMATOLOGY/ONCOLOGY | Facility: CLINIC | Age: 65
End: 2025-01-22
Payer: MEDICARE

## 2025-01-22 ENCOUNTER — SOCIAL WORK (OUTPATIENT)
Dept: HEMATOLOGY/ONCOLOGY | Facility: CLINIC | Age: 65
End: 2025-01-22
Payer: MEDICARE

## 2025-01-22 ENCOUNTER — SOCIAL WORK (OUTPATIENT)
Dept: HEMATOLOGY/ONCOLOGY | Facility: CLINIC | Age: 65
End: 2025-01-22

## 2025-01-22 VITALS
OXYGEN SATURATION: 100 % | BODY MASS INDEX: 47.02 KG/M2 | HEART RATE: 82 BPM | DIASTOLIC BLOOD PRESSURE: 69 MMHG | SYSTOLIC BLOOD PRESSURE: 137 MMHG | WEIGHT: 306.66 LBS | RESPIRATION RATE: 18 BRPM | TEMPERATURE: 97.5 F

## 2025-01-22 DIAGNOSIS — D75.81 MYELOFIBROSIS (MULTI): ICD-10-CM

## 2025-01-22 PROCEDURE — P9040 RBC LEUKOREDUCED IRRADIATED: HCPCS

## 2025-01-22 PROCEDURE — 36430 TRANSFUSION BLD/BLD COMPNT: CPT

## 2025-01-22 RX ORDER — HEPARIN SODIUM,PORCINE/PF 10 UNIT/ML
50 SYRINGE (ML) INTRAVENOUS AS NEEDED
OUTPATIENT
Start: 2025-01-22

## 2025-01-22 RX ORDER — HEPARIN 100 UNIT/ML
500 SYRINGE INTRAVENOUS AS NEEDED
OUTPATIENT
Start: 2025-01-22

## 2025-01-22 RX ORDER — DOXYCYCLINE HYCLATE 100 MG
100 TABLET ORAL 2 TIMES DAILY
Qty: 14 TABLET | Refills: 0 | Status: SHIPPED | OUTPATIENT
Start: 2025-01-22 | End: 2025-01-29

## 2025-01-22 ASSESSMENT — PAIN SCALES - GENERAL: PAINLEVEL_OUTOF10: 0-NO PAIN

## 2025-01-22 NOTE — TELEPHONE ENCOUNTER
Per infusion nurse-pt had stepped on broken glass and had a small piece embedded in his great toe.  This was successfully removed and cleaned by Yasmine Felipe RN.  Dr. Beebe aware-pt to begin doxycycline antibiotic.  Rx pended to Dr. Beebe

## 2025-01-22 NOTE — PROGRESS NOTES
Social work continues to follow this patient for ongoing assessment and support. I met with the patient in Luis Jay, [facility]'s absence today. He remains pleasant and easily engaged in conversation. He spoke with Luis about insurance last week and brought ID number for new insurance starting 2/1/25 ([facility] [place] [id number]); this was forwarded to appropriate team members to ensure treatment is authorized. The patient mentioned concerns regarding getting refill of Ojjaara as he does not have prescription coverage until 2/1. I explained that patients with Medicare and Medicaid (which he has currently) are typically placed in a Part D plan; the patient has no knowledge of this. I encouraged him to check back with the  helping him with his insurance. The patient expressed concern also that he is supposed to start an antibiotic today and is wondering how to get this. I reached out to Carmina Elder of  Specialty Pharmacy, who will look into assistance options for Ojjaara while the patient awaits coverage. He was informed of the same. I printed a coupon for doxycycline for him to use at [facility]. I explained that if the coupon does not work, he should discuss with the pharmacy and they should be able to access an alternate coupon for him to use. He expressed understanding and appreciation. His significant other arrived to pick him up and inquired about ordering a wheelchair. I explained that a standard wheelchair has been ordered for him through [employer] ([phone]), the company providing his oxygen. I encouraged her to call to arrange delivery of the wheelchair. Phone number provided. They expressed appreciation for the assistance. Luis updated regarding the above. Social work will remain available to assist this patient.    Marie Brownlee, [facility], LOLLY-S

## 2025-01-22 NOTE — PROGRESS NOTES
LSW met with patient today during his count check appointment.  Patient remains open to meeting with social work and was easily engaged in questions.  LSW followed up regarding questions patient had on the use of his portable 02 concentrator.  Per patient, the concentrator only will stay charged for 1-2 hours.  Covering , GIANCARLO Carter had reached out to [facility] regarding this question.  Per [facility] representative, due to the patient's L flow (4-5L) the portable concentrator will only last for 1-2 hours, he may benefit more from an E-tank or following up with his pulmonologist to discuss other equipment.  Patient stated his understanding.  Also inquired if patient had received his wheelchair yet, but he states that he hasn't, he was provided with [facility]'s contact information and will plan to reach out to check the status of the DME. Patient also inquired if he could have grab/safety bars ordered and installed through his insurance - LSW explained that this is not a benefit that is covered by Medicare.  However, LSW did see that patient submitted for an ADA accommodation with his housing authority to where he requested for grab bars to be installed. Patient was unsure of what the status was of this but LSW encouraged him to speak with his Landlord regarding this request.      In addition, LSW inquired about patient's current insurance issues as patient shared he no longer has [employer] Medicare.  Patient states that he was working with an  who suggested that he term his [employer] Medicare coverage as he did not need to have that since he has Medicaid as well.  Patient is unsure now if he has adequate coverage.  Reviewed with patient that he currently has Medicare A/B and [place] Medicaid - which means that the patient's premiums, deductibles and coinsurance are all covered.  Patient also has a part D plan but LSW is unsure of what his new ID number is.  Patient believes he may have  this information at home and will plan to bring his card with him on Wednesday to his appointment for review.  LSW also reached out to  specialty Pharmacy Representative, Carminataj Elder, to see if she could see an active prescription plan. Unfortunately, at this time Carmina could not see an active prescription plan.   Once patient provide part D information LSW will forward it to Carmina to see if it is running as active.      LSW also discussed transportation concerns as patient's car was not working last week and he was unable to make it to one of appointments.  LSW shared that we could try to submit for patient's Medicaid to transition to S[employer] so that he could then utilize transportation resources through his insurance.  He was open to this - LSW will plan to submit an SRS referral today.      No other issues or concerns noted at this time.  LSW will continue to follow.

## 2025-01-23 ENCOUNTER — LAB REQUISITION (OUTPATIENT)
Dept: LAB | Facility: CLINIC | Age: 65
End: 2025-01-23
Payer: MEDICARE

## 2025-01-23 DIAGNOSIS — D75.81 MYELOFIBROSIS (MULTI): ICD-10-CM

## 2025-01-23 LAB
BB ANTIBODY IDENTIFICATION: NORMAL
CASE #: NORMAL

## 2025-01-24 ENCOUNTER — SOCIAL WORK (OUTPATIENT)
Dept: HEMATOLOGY/ONCOLOGY | Facility: CLINIC | Age: 65
End: 2025-01-24
Payer: MEDICARE

## 2025-01-27 ENCOUNTER — OFFICE VISIT (OUTPATIENT)
Dept: HEMATOLOGY/ONCOLOGY | Facility: CLINIC | Age: 65
End: 2025-01-27
Payer: MEDICARE

## 2025-01-27 ENCOUNTER — APPOINTMENT (OUTPATIENT)
Dept: RADIOLOGY | Facility: HOSPITAL | Age: 65
End: 2025-01-27
Payer: MEDICARE

## 2025-01-27 ENCOUNTER — INFUSION (OUTPATIENT)
Dept: HEMATOLOGY/ONCOLOGY | Facility: CLINIC | Age: 65
End: 2025-01-27
Payer: MEDICARE

## 2025-01-27 ENCOUNTER — SOCIAL WORK (OUTPATIENT)
Dept: HEMATOLOGY/ONCOLOGY | Facility: CLINIC | Age: 65
End: 2025-01-27

## 2025-01-27 ENCOUNTER — APPOINTMENT (OUTPATIENT)
Dept: CARDIOLOGY | Facility: CLINIC | Age: 65
End: 2025-01-27
Payer: MEDICARE

## 2025-01-27 VITALS
TEMPERATURE: 97.7 F | RESPIRATION RATE: 18 BRPM | BODY MASS INDEX: 46.61 KG/M2 | WEIGHT: 304.01 LBS | HEART RATE: 106 BPM | DIASTOLIC BLOOD PRESSURE: 77 MMHG | SYSTOLIC BLOOD PRESSURE: 154 MMHG | OXYGEN SATURATION: 96 %

## 2025-01-27 DIAGNOSIS — D75.81 MYELOFIBROSIS (MULTI): ICD-10-CM

## 2025-01-27 DIAGNOSIS — J43.2 CENTRILOBULAR EMPHYSEMA (MULTI): ICD-10-CM

## 2025-01-27 DIAGNOSIS — F17.211 CIGARETTE NICOTINE DEPENDENCE IN REMISSION: ICD-10-CM

## 2025-01-27 DIAGNOSIS — R91.1 RIGHT UPPER LOBE PULMONARY NODULE: ICD-10-CM

## 2025-01-27 DIAGNOSIS — D75.81 MYELOFIBROSIS (MULTI): Primary | ICD-10-CM

## 2025-01-27 DIAGNOSIS — I10 PRIMARY HYPERTENSION: ICD-10-CM

## 2025-01-27 LAB
ABO GROUP (TYPE) IN BLOOD: NORMAL
ALBUMIN SERPL BCP-MCNC: 4.2 G/DL (ref 3.4–5)
ALP SERPL-CCNC: 76 U/L (ref 33–136)
ALT SERPL W P-5'-P-CCNC: 101 U/L (ref 10–52)
ANION GAP SERPL CALC-SCNC: 10 MMOL/L (ref 10–20)
ANTIBODY SCREEN: NORMAL
AST SERPL W P-5'-P-CCNC: 52 U/L (ref 9–39)
BASOPHILS # BLD AUTO: 0 X10*3/UL (ref 0–0.1)
BASOPHILS NFR BLD AUTO: 0 %
BILIRUB SERPL-MCNC: 0.8 MG/DL (ref 0–1.2)
BUN SERPL-MCNC: 14 MG/DL (ref 6–23)
CALCIUM SERPL-MCNC: 9.1 MG/DL (ref 8.6–10.3)
CHLORIDE SERPL-SCNC: 96 MMOL/L (ref 98–107)
CO2 SERPL-SCNC: 35 MMOL/L (ref 21–32)
CREAT SERPL-MCNC: 0.99 MG/DL (ref 0.5–1.3)
EGFRCR SERPLBLD CKD-EPI 2021: 84 ML/MIN/1.73M*2
EOSINOPHIL # BLD AUTO: 0 X10*3/UL (ref 0–0.7)
EOSINOPHIL NFR BLD AUTO: 0 %
ERYTHROCYTE [DISTWIDTH] IN BLOOD BY AUTOMATED COUNT: 15.2 % (ref 11.5–14.5)
GLUCOSE SERPL-MCNC: 107 MG/DL (ref 74–99)
HCT VFR BLD AUTO: 20.8 % (ref 41–52)
HGB BLD-MCNC: 7.4 G/DL (ref 13.5–17.5)
IMM GRANULOCYTES # BLD AUTO: 0 X10*3/UL (ref 0–0.7)
IMM GRANULOCYTES NFR BLD AUTO: 0 % (ref 0–0.9)
LYMPHOCYTES # BLD AUTO: 1.14 X10*3/UL (ref 1.2–4.8)
LYMPHOCYTES NFR BLD AUTO: 84.4 %
MCH RBC QN AUTO: 32.3 PG (ref 26–34)
MCHC RBC AUTO-ENTMCNC: 35.6 G/DL (ref 32–36)
MCV RBC AUTO: 91 FL (ref 80–100)
MONOCYTES # BLD AUTO: 0.07 X10*3/UL (ref 0.1–1)
MONOCYTES NFR BLD AUTO: 5.2 %
NEUTROPHILS # BLD AUTO: 0.14 X10*3/UL (ref 1.2–7.7)
NEUTROPHILS NFR BLD AUTO: 10.4 %
PLATELET # BLD AUTO: 11 X10*3/UL (ref 150–450)
POTASSIUM SERPL-SCNC: 3.9 MMOL/L (ref 3.5–5.3)
PROT SERPL-MCNC: 8 G/DL (ref 6.4–8.2)
RBC # BLD AUTO: 2.29 X10*6/UL (ref 4.5–5.9)
RH FACTOR (ANTIGEN D): NORMAL
SODIUM SERPL-SCNC: 137 MMOL/L (ref 136–145)
WBC # BLD AUTO: 1.4 X10*3/UL (ref 4.4–11.3)

## 2025-01-27 PROCEDURE — 3078F DIAST BP <80 MM HG: CPT | Performed by: INTERNAL MEDICINE

## 2025-01-27 PROCEDURE — 86870 RBC ANTIBODY IDENTIFICATION: CPT

## 2025-01-27 PROCEDURE — 36415 COLL VENOUS BLD VENIPUNCTURE: CPT

## 2025-01-27 PROCEDURE — 3077F SYST BP >= 140 MM HG: CPT | Performed by: INTERNAL MEDICINE

## 2025-01-27 PROCEDURE — 80053 COMPREHEN METABOLIC PANEL: CPT

## 2025-01-27 PROCEDURE — 85025 COMPLETE CBC W/AUTO DIFF WBC: CPT | Performed by: INTERNAL MEDICINE

## 2025-01-27 PROCEDURE — 1160F RVW MEDS BY RX/DR IN RCRD: CPT | Performed by: INTERNAL MEDICINE

## 2025-01-27 PROCEDURE — 86971 RBC PRETX INCUBATJ W/ENZYMES: CPT

## 2025-01-27 PROCEDURE — 1125F AMNT PAIN NOTED PAIN PRSNT: CPT | Performed by: INTERNAL MEDICINE

## 2025-01-27 PROCEDURE — 1123F ACP DISCUSS/DSCN MKR DOCD: CPT | Performed by: INTERNAL MEDICINE

## 2025-01-27 PROCEDURE — 99214 OFFICE O/P EST MOD 30 MIN: CPT | Performed by: INTERNAL MEDICINE

## 2025-01-27 PROCEDURE — 86900 BLOOD TYPING SEROLOGIC ABO: CPT

## 2025-01-27 PROCEDURE — 86901 BLOOD TYPING SEROLOGIC RH(D): CPT

## 2025-01-27 PROCEDURE — 86860 RBC ANTIBODY ELUTION: CPT

## 2025-01-27 PROCEDURE — 1159F MED LIST DOCD IN RCRD: CPT | Performed by: INTERNAL MEDICINE

## 2025-01-27 PROCEDURE — G2211 COMPLEX E/M VISIT ADD ON: HCPCS | Performed by: INTERNAL MEDICINE

## 2025-01-27 PROCEDURE — 86880 COOMBS TEST DIRECT: CPT

## 2025-01-27 RX ORDER — HEPARIN SODIUM,PORCINE/PF 10 UNIT/ML
50 SYRINGE (ML) INTRAVENOUS AS NEEDED
OUTPATIENT
Start: 2025-01-27

## 2025-01-27 RX ORDER — DIPHENHYDRAMINE HYDROCHLORIDE 50 MG/ML
50 INJECTION INTRAMUSCULAR; INTRAVENOUS AS NEEDED
OUTPATIENT
Start: 2025-01-27

## 2025-01-27 RX ORDER — FAMOTIDINE 10 MG/ML
20 INJECTION INTRAVENOUS ONCE AS NEEDED
OUTPATIENT
Start: 2025-01-27

## 2025-01-27 RX ORDER — HEPARIN 100 UNIT/ML
500 SYRINGE INTRAVENOUS AS NEEDED
OUTPATIENT
Start: 2025-01-27

## 2025-01-27 RX ORDER — EPINEPHRINE 0.3 MG/.3ML
0.3 INJECTION SUBCUTANEOUS EVERY 5 MIN PRN
OUTPATIENT
Start: 2025-01-27

## 2025-01-27 RX ORDER — ALBUTEROL SULFATE 0.83 MG/ML
3 SOLUTION RESPIRATORY (INHALATION) AS NEEDED
OUTPATIENT
Start: 2025-01-27

## 2025-01-27 ASSESSMENT — ENCOUNTER SYMPTOMS
DEPRESSION: 1
NEUROLOGICAL NEGATIVE: 1
HEMATOLOGIC/LYMPHATIC NEGATIVE: 1
NERVOUS/ANXIOUS: 1
SHORTNESS OF BREATH: 1
FATIGUE: 1
ENDOCRINE NEGATIVE: 1
EYES NEGATIVE: 1
CARDIOVASCULAR NEGATIVE: 1
GASTROINTESTINAL NEGATIVE: 1
ARTHRALGIAS: 1

## 2025-01-27 ASSESSMENT — PAIN SCALES - GENERAL: PAINLEVEL_OUTOF10: 6

## 2025-01-27 NOTE — PATIENT INSTRUCTIONS
You should go ahead get 2nd opinion from Dr Balwinder Beltrán    Continue taking the ojjaara    We will continue to check your blood counts weekly

## 2025-01-27 NOTE — PROGRESS NOTES
Patient ID: Jaycee Harp is a 65 y.o. male.  Referring Physician: No referring provider defined for this encounter.  Primary Care Provider: Mandeep Tucker DO  Visit Type: Follow Up      Subjective    HPI I want to go to [facility] for a 2nd opinion  I am not interested in a blood transfusion    Review of Systems   Constitutional:  Positive for fatigue.   HENT:  Negative.     Eyes: Negative.    Respiratory:  Positive for shortness of breath.    Cardiovascular: Negative.    Gastrointestinal: Negative.    Endocrine: Negative.    Genitourinary: Negative.     Musculoskeletal:  Positive for arthralgias.   Skin: Negative.    Neurological: Negative.    Hematological: Negative.    Psychiatric/Behavioral:  Positive for depression. The patient is nervous/anxious.         Objective   BSA: 2.57 meters squared  /77 (BP Location: Left arm)   Pulse 106   Temp 36.5 °C (97.7 °F) (Temporal)   Resp 18   Wt 138 kg (304 lb 0.2 oz)   SpO2 96% Comment: pt is on 3L of O2  BMI 46.61 kg/m²      has a past medical history of Contact with and (suspected) exposure to covid-19 (08/28/2020), Hematuria (10/12/2023), Personal history of other endocrine, nutritional and metabolic disease (01/13/2021), and Personal history of other specified conditions (08/28/2020).   has a past surgical history that includes Other surgical history (10/31/2019).  Family History   Problem Relation Name Age of Onset    Diabetes Mother      Other (varicose veins) Mother      Liver cancer Mother      Diabetes Father      Tuberculosis Father       Oncology History Overview Note   Prognosis by data available at the time of diagnosis:  - GIPSS = 4 (int-2, median os 4.6y)  - MIPSS = 11 (very high risk, median os 1.8y)  - DIPSS = 4 (int-2, median os   - DIPSS Plus = 4 (high risk)  - MTSS = 7 (5y os 22%)    Note:  no cytogenetic results (karyotype culture failure); could increase risk but not decrease it     Myelofibrosis (Multi)   11/28/2024 Initial  Diagnosis    Myeloproliferative Neoplasm - favor Primary Myelofibrosis  Diagnosis:     BONE MARROW CLOT WITH ASPIRATE AND CORE WITH TOUCH PREP, LEFT ILIAC CREST:    --HYPERCELLULAR BONE MARROW (95%) WITH ATYPICAL MEGAKARYOCYTES AND INCREASED FIBROSIS CONSISTENT WITH MYELOPROLIFERATIVE NEOPLASM, SEE NOTE.    NOTE: Sections show increased and morphologically atypical megakaryocytes frequently arranged in clusters in a slightly hypercellular bone marrow. Blasts are not increased by morphology or CD34 immunohistochemical stain. There is increaased reticulin fibrosis (MF grade 2). There is not definitive morphologic evidence of dysplasia. 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 The overall findings are most consistent with a myeloproliferative neoplasm, favor primary myelofibrosis. Chromosomal analysis and FISH studies will be attempted and results will be reported separately. Correlation with clinical findings is recommended  Molecular: DNMT3A p.R736H ([id number] c.2207G>A)  U2AF1 p.S34F ([id number] c.[id number]>T)  Karyotype/microarray: karyotype culture failure; microarray with Allelic imbalances (copy neutral loss of heterozygosity) were detected including 2p25.3p14(0-68,210,872).      12/2/2024 -  Chemotherapy    Treatment:   I. Momelotinib 200mg/day -  Month 1: started 12/2/2024 -   100mg          Jaycee Harp  reports that he quit smoking about 54 years ago. His smoking use included cigarettes. He started smoking about 4 months ago. He has been exposed to tobacco smoke. He has quit using smokeless tobacco.  He  reports that he does not currently use alcohol after a past usage of about 24.0 standard drinks of alcohol per week.  He  reports current drug use. Drug: Marijuana.    Physical Exam  Vitals reviewed.   Constitutional:       Appearance: Normal appearance.      Comments: Using supplemental oxygen   HENT:       Head: Normocephalic.      Mouth/Throat:      Mouth: Mucous membranes are moist.   Eyes:      Extraocular Movements: Extraocular movements intact.      Pupils: Pupils are equal, round, and reactive to light.   Cardiovascular:      Rate and Rhythm: Normal rate and regular rhythm.      Pulses: Normal pulses.      Heart sounds: Normal heart sounds.   Pulmonary:      Effort: Pulmonary effort is normal.      Breath sounds: Rales present.   Abdominal:      General: Bowel sounds are normal.      Palpations: Abdomen is soft.   Musculoskeletal:         General: Normal range of motion.      Cervical back: Normal range of motion and neck supple.   Skin:     General: Skin is warm.   Neurological:      General: No focal deficit present.      Mental Status: He is alert and oriented to person, place, and time.   Psychiatric:         Mood and Affect: Mood normal.         Behavior: Behavior normal.         WBC   Date/Time Value Ref Range Status   01/20/2025 09:10 AM 1.4 (L) 4.4 - 11.3 x10*3/uL Final   01/13/2025 12:07 PM 1.2 (L) 4.4 - 11.3 x10*3/uL Final   01/06/2025 09:04 AM 1.5 (L) 4.4 - 11.3 x10*3/uL Final     nRBC   Date Value Ref Range Status   01/20/2025   Final     Comment:     Not Measured   01/13/2025   Final     Comment:     Not Measured   01/06/2025   Final     Comment:     Not Measured     RBC   Date Value Ref Range Status   01/20/2025 2.08 (L) 4.50 - 5.90 x10*6/uL Final   01/13/2025 2.01 (L) 4.50 - 5.90 x10*6/uL Final   01/06/2025 2.08 (L) 4.50 - 5.90 x10*6/uL Final     POC Hemoglobin   Date Value Ref Range Status   11/22/2024 6.3 (A) 13.5 - 17.5 g/dL Final     Hemoglobin   Date Value Ref Range Status   01/20/2025 6.6 (L) 13.5 - 17.5 g/dL Final   01/13/2025 6.4 (LL) 13.5 - 17.5 g/dL Final   01/06/2025 6.7 (L) 13.5 - 17.5 g/dL Final     Hematocrit   Date Value Ref Range Status   01/20/2025 19.2 (L) 41.0 - 52.0 % Final   01/13/2025 18.7 (L) 41.0 - 52.0 % Final   01/06/2025 19.3 (L) 41.0 - 52.0 % Final     MCV  "  Date/Time Value Ref Range Status   01/20/2025 09:10 AM 92 80 - 100 fL Final   01/13/2025 12:07 PM 93 80 - 100 fL Final   01/06/2025 09:04 AM 93 80 - 100 fL Final     MCH   Date/Time Value Ref Range Status   01/20/2025 09:10 AM 31.7 26.0 - 34.0 pg Final   01/13/2025 12:07 PM 31.8 26.0 - 34.0 pg Final   01/06/2025 09:04 AM 32.2 26.0 - 34.0 pg Final     MCHC   Date/Time Value Ref Range Status   01/20/2025 09:10 AM 34.4 32.0 - 36.0 g/dL Final   01/13/2025 12:07 PM 34.2 32.0 - 36.0 g/dL Final   01/06/2025 09:04 AM 34.7 32.0 - 36.0 g/dL Final     RDW   Date/Time Value Ref Range Status   01/20/2025 09:10 AM 15.9 (H) 11.5 - 14.5 % Final   01/13/2025 12:07 PM 16.8 (H) 11.5 - 14.5 % Final   01/06/2025 09:04 AM 17.0 (H) 11.5 - 14.5 % Final     Platelets   Date/Time Value Ref Range Status   01/20/2025 09:10 AM 9 (LL) 150 - 450 x10*3/uL Final     Comment:     Platelet count verified by smear review.   01/13/2025 12:07 PM 13 (LL) 150 - 450 x10*3/uL Final   01/06/2025 09:04 AM 12 (LL) 150 - 450 x10*3/uL Final     No results found for: \"MPV\"  Neutrophils %   Date/Time Value Ref Range Status   01/20/2025 09:10 AM 9.1 40.0 - 80.0 % Final   01/13/2025 12:07 PM 6.1 40.0 - 80.0 % Final   01/06/2025 09:04 AM 13.8 40.0 - 80.0 % Final     Immature Granulocytes %, Automated   Date/Time Value Ref Range Status   01/20/2025 09:10 AM 0.0 0.0 - 0.9 % Final     Comment:     Immature Granulocyte Count (IG) includes promyelocytes, myelocytes and metamyelocytes but does not include bands. Percent differential counts (%) should be interpreted in the context of the absolute cell counts (cells/UL).   01/13/2025 12:07 PM 3.5 (H) 0.0 - 0.9 % Final     Comment:     Immature Granulocyte Count (IG) includes promyelocytes, myelocytes and metamyelocytes but does not include bands. Percent differential counts (%) should be interpreted in the context of the absolute cell counts (cells/UL).   01/06/2025 09:04 AM 0.0 0.0 - 0.9 % Final     Comment:     Immature " Granulocyte Count (IG) includes promyelocytes, myelocytes and metamyelocytes but does not include bands. Percent differential counts (%) should be interpreted in the context of the absolute cell counts (cells/UL).     Lymphocytes %, Manual   Date/Time Value Ref Range Status   11/14/2024 02:58 PM 76.0 13.0 - 44.0 % Final   10/11/2024 09:59 AM 70.0 13.0 - 44.0 % Final     Lymphocytes %   Date/Time Value Ref Range Status   01/20/2025 09:10 AM 83.9 13.0 - 44.0 % Final   01/13/2025 12:07 PM 84.3 13.0 - 44.0 % Final   01/06/2025 09:04 AM 81.4 13.0 - 44.0 % Final     Monocytes %, Manual   Date/Time Value Ref Range Status   11/14/2024 02:58 PM 5.0 2.0 - 10.0 % Final   10/11/2024 09:59 AM 3.0 2.0 - 10.0 % Final     Monocytes %   Date/Time Value Ref Range Status   01/20/2025 09:10 AM 6.3 2.0 - 10.0 % Final   01/13/2025 12:07 PM 5.2 2.0 - 10.0 % Final   01/06/2025 09:04 AM 4.8 2.0 - 10.0 % Final     Eosinophils %, Manual   Date/Time Value Ref Range Status   11/14/2024 02:58 PM 2.0 0.0 - 6.0 % Final   10/11/2024 09:59 AM 2.0 0.0 - 6.0 % Final     Eosinophils %   Date/Time Value Ref Range Status   01/20/2025 09:10 AM 0.7 0.0 - 6.0 % Final   01/13/2025 12:07 PM 0.9 0.0 - 6.0 % Final   01/06/2025 09:04 AM 0.0 0.0 - 6.0 % Final     Basophils %, Manual   Date/Time Value Ref Range Status   11/14/2024 02:58 PM 0.0 0.0 - 2.0 % Final   10/11/2024 09:59 AM 0.0 0.0 - 2.0 % Final     Basophils %   Date/Time Value Ref Range Status   01/20/2025 09:10 AM 0.0 0.0 - 2.0 % Final   01/13/2025 12:07 PM 0.0 0.0 - 2.0 % Final   01/06/2025 09:04 AM 0.0 0.0 - 2.0 % Final     Neutrophils Absolute   Date/Time Value Ref Range Status   01/20/2025 09:10 AM 0.13 (L) 1.20 - 7.70 x10*3/uL Final     Comment:     Percent differential counts (%) should be interpreted in the context of the absolute cell counts (cells/uL).   01/13/2025 12:07 PM 0.07 (L) 1.20 - 7.70 x10*3/uL Final     Comment:     Percent differential counts (%) should be interpreted in the context  "of the absolute cell counts (cells/uL).   01/06/2025 09:04 AM 0.20 (L) 1.20 - 7.70 x10*3/uL Final     Comment:     Percent differential counts (%) should be interpreted in the context of the absolute cell counts (cells/uL).     Immature Granulocytes Absolute, Automated   Date/Time Value Ref Range Status   01/20/2025 09:10 AM 0.00 0.00 - 0.70 x10*3/uL Final   01/13/2025 12:07 PM 0.04 0.00 - 0.70 x10*3/uL Final   01/06/2025 09:04 AM 0.00 0.00 - 0.70 x10*3/uL Final     Lymphocytes Absolute   Date/Time Value Ref Range Status   01/20/2025 09:10 AM 1.20 1.20 - 4.80 x10*3/uL Final   01/13/2025 12:07 PM 0.97 (L) 1.20 - 4.80 x10*3/uL Final   01/06/2025 09:04 AM 1.18 (L) 1.20 - 4.80 x10*3/uL Final     Monocytes Absolute   Date/Time Value Ref Range Status   01/20/2025 09:10 AM 0.09 (L) 0.10 - 1.00 x10*3/uL Final   01/13/2025 12:07 PM 0.06 (L) 0.10 - 1.00 x10*3/uL Final   01/06/2025 09:04 AM 0.07 (L) 0.10 - 1.00 x10*3/uL Final     Eosinophils Absolute   Date/Time Value Ref Range Status   01/20/2025 09:10 AM 0.01 0.00 - 0.70 x10*3/uL Final   01/13/2025 12:07 PM 0.01 0.00 - 0.70 x10*3/uL Final   01/06/2025 09:04 AM 0.00 0.00 - 0.70 x10*3/uL Final     Eosinophils Absolute, Manual   Date/Time Value Ref Range Status   11/14/2024 02:58 PM 0.04 0.00 - 0.70 x10*3/uL Final   10/11/2024 09:59 AM 0.04 0.00 - 0.70 x10*3/uL Final     Basophils Absolute   Date/Time Value Ref Range Status   01/20/2025 09:10 AM 0.00 0.00 - 0.10 x10*3/uL Final   01/13/2025 12:07 PM 0.00 0.00 - 0.10 x10*3/uL Final     Comment:     Automated WBC differential has been confirmed by manual smear.   01/06/2025 09:04 AM 0.00 0.00 - 0.10 x10*3/uL Final     Comment:     Automated WBC differential has been confirmed by manual smear.     Basophils Absolute, Manual   Date/Time Value Ref Range Status   11/14/2024 02:58 PM 0.00 0.00 - 0.10 x10*3/uL Final   10/11/2024 09:59 AM 0.00 0.00 - 0.10 x10*3/uL Final       No components found for: \"PT\"  aPTT   Date/Time Value Ref " Range Status   11/15/2024 12:05 AM 25 (L) 27 - 38 seconds Final   09/15/2021 04:30 PM 35 25 - 35 sec Final     Comment:       THE APTT IS NO LONGER USED FOR MONITORING     UNFRACTIONATED HEPARIN THERAPY.    FOR MONITORING HEPARIN THERAPY,     USE THE HEPARIN ASSAY.       Medication Documentation Review Audit       Reviewed by Alexandra Mckeon MA (Medical Assistant) on 25 at 0922      Medication Order Taking? Sig Documenting Provider Last Dose Status   acyclovir (Zovirax) 400 mg tablet [id number] Yes Take 1 tablet (400 mg) by mouth 2 times a day. ERICA Burton-CNP  Active   albuterol (ProAir HFA) 90 mcg/actuation inhaler [id number] Yes Inhale 2 puffs every 4 hours if needed for wheezing or shortness of breath. Mandeep Tucker DO 2024 Active   budesonide-glycopyr-formoterol (Breztri Aerosphere) 160-9-4.8 mcg/actuation HFA aerosol inhaler [id number] Yes Inhale 2 puffs 2 times a day. Rinse mouth after. Historical Provider, MD  Active   doxycycline (Vibra-Tabs) 100 mg tablet [id number] Yes Take 1 tablet (100 mg) by mouth 2 times a day for 7 days. Take with a full glass of water and do not lie down for at least 30 minutes after. Raul Beebe MD  Active   hydrOXYzine pamoate (VistariL) 25 mg capsule [id number]  Take 1 capsule (25 mg) by mouth every 6 hours if needed for itching or anxiety for up to 10 days. Mandeep Tucker,    24 [id number]   losartan-hydrochlorothiazide (Hyzaar) 50-12.5 mg tablet [id number] Yes TAKE 1 TABLET BY MOUTH EVERY DAY Mandeep Tucker DO  Active   momelotinib (Ojjaara) 100 mg tablet tablet [id number] Yes Take 1 tablet (100 mg) by mouth once daily. Raul Beebe MD  Active   omeprazole (PriLOSEC) 20 mg DR capsule [id number] Yes TAKE 1 CAPSULE (20 MG) BY MOUTH 2 TIMES A DAY. DO NOT CRUSH OR CHEW. Mandeep Tucker DO  Active   POTASSIUM CHLORIDE ORAL [id number] Yes Take by mouth. Historical Provider, MD  Active   tadalafil (Cialis) 20 mg tablet [id number]  Take  1 tablet (20 mg) by mouth once daily as needed for erectile dysfunction. Shea Patel, APRN-CNP   24 [id number]   varenicline (Chantix) 0.5 mg tablet [id number] Yes Take 1 tablet (0.5 mg) by mouth 2 times a day. Take with full glass of water. Historical Provider, MD 2024 Active                   Assessment/Plan    1) pancytopenia  -I reviewed his lab history in the EMR  -2021 wbc 7.2, hgb 16.2, plt 313,000  -9/15/2021 wbc 8.6, hgb 14.4, plt 242,000  -2023 wbc 11.2, hgb 13.9, plt 248,000  -10/11/2024 wbc 1.8, hgb 9.3, , plt 37,000, , abs lymph 1260 (smear: mild polychromasia, few ovalocytes, few teardrops, basophilic stippling)  -he is fatigued because of the anemia  -he was incarcerated for a number of years, and ended up with fungal infections in his toenails, which probably have nothing to do with his current pancytopenia, which is a fairly new development  -he may have MDS, or hairy cell leukemia, or even AML  -I have recommended proceeding with a bone marrow biopsy as soon as possible  -bmbx done on 2024  reviewed--hypercellular bone marrow (95%) with atypical megakaryocytes and increased fibrosis consistent with myeloproliferative neoplasm; blasts are not increased by morphology or CD34 IHC; there is increased reticulin fibrosis (MF grade 2); molecular studies showed disease associated mutations in DNMT3A and U2AF1 however, no pathogenic mutations in JAK2, MPL or CALR. 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  -case was reviewed in malignant heme TB on 2024--panel consensus was to start with momelotinib and then proceed with transplant evaluation  -he was admitted to [facility] a few days ago--was transfused PRBC  -today we will recheck CBC and then set him up for weekly lab checks--will transfuse if hgb <=7, and platelets <10K  -he saw Dr Massey on 2024--he advised to continue with momelotinib, weekly lab checks and to begin evaluation for  "transplant (he will see Dr Vargas in January)  -he is awaiting appointment with our psych NP Osvaldo Roper  -he continues to take momelotinib 100 mg PO daily  -11/8/2024  CBC shows wbc 1.8, hgb 8.1, plt 14,000,   -11/25/2024 wbc 1.8, hgb 7.6, plt 15,000,   -12/2/2024 wbc 1.4, hgb 6.6, plt 19,000   -12/5/2024 wbc 2.1, hgb 6.8, plt 18,000,   -12/9/2024 wbc 1.9, hgb 6.8, plt 13,000,   -12/16/2024 wbc 1.6, hgb 6.8, plt 15,000, , creatinine 1.11, alk phos 102, AST 43, ALT 84  -will consider antimicrobial prophylaxis if ANC drops to <200  -will continue with transfusion support PRN  -he will see Dr Massey again on 1/2/2025  -he will see Dr Vargas on 1/10/2025     1/15/2025 ** Jaycee has been diagnosed with myelofibrosis undergoing treatment and utilizes home oxygen. He is experiencing weakness, fatigue, and shortness of breath. He would benefit from the use of a wheelchair in the home. A cane or a walker would not provide enough support and would be unsafe for him at this time. He would benefit from the use of a wheelchair to increase his activity in the home. He will be able to use this safely with the assistance of his family.    -here for interval followup  -because of his cohort of medical problems as well as social issues (including the fact that his [employer] insurance plan discontinued his prescription plan coverage without informing him, so that he now has a new insurance plan that will become effective on February 1), he has decided against consideration for bone marrow transplant  -he continues to take momelotinib 100 mg daily  -also taking acyclovir 400 mg PO BID  -remains transfusion dependent, and last week, he started with platelet transfusion  -he was unable to come last week as his car wouldn't start  -his significant other's niece supposedly has myelofibrosis too, 15 years ago was placed on clinical trial and to this day is still taking the \"experimental drug " "that starts with a number\" and doing well, so that he wants to go see Dr Balwinder Beltrán for 2nd opinion  -today we checked CBC + COMP  -results reviewed--wbc 1.4, hgb 7.4, plt 11,000, , abs lymph 1140, creatinine 0.99, calcium 9.1, alk phos 76, AST 52,   -as hgb >7.0 and plt >10K, he does not need transfusion support this week  -will continue to take momelotinib 100 mg daily  -will continue to watch LFTs as well  -will see him again in a few weeks     2) COPD/emphysema  -on proair  -he is not actually dependent on oxygen--he only started using oxygen PRN when he became anemic     3) hypertension  -on losartan-hydrochlorothiazide     4) high BMI  -on phentermine  -on wegovy     5) tobacco abuse  -on chantix     6) lung nodule  -LDCT done on 10/3/2024 showed a 1.7 cm pleural based nodule in the RUL   -PET scan done on 10/7/2024 showed FDG avid pleural based right upper lobe nodule with SUV 4.9; there is mild hypermetabolic activity along the periphery paraseptal emphysematous changes within left lower lobe with SUV 2.9; no FDG avid mediastinal, hilar or axillary lymphadenopathy  -he has seen thoracic surgeon Dr Garcia, who recommended either resection vs SBRT  -this lesion looks atypical for malignancy--could be inflammatory  -because of his marked thrombocytopenia--biopsy cannot be done right now     Problem List Items Addressed This Visit             ICD-10-CM    Myelofibrosis (Multi) - Primary D75.81    Relevant Orders    Comprehensive Metabolic Panel            Raul Beebe MD                         "

## 2025-01-30 ENCOUNTER — TELEPHONE (OUTPATIENT)
Dept: HEMATOLOGY/ONCOLOGY | Facility: CLINIC | Age: 65
End: 2025-01-30

## 2025-01-30 ENCOUNTER — HOSPITAL ENCOUNTER (OUTPATIENT)
Dept: CARDIOLOGY | Facility: HOSPITAL | Age: 65
Discharge: HOME | End: 2025-01-30
Payer: MEDICARE

## 2025-01-30 ENCOUNTER — APPOINTMENT (OUTPATIENT)
Dept: PRIMARY CARE | Facility: CLINIC | Age: 65
End: 2025-01-30
Payer: MEDICARE

## 2025-01-30 DIAGNOSIS — Z76.82 STEM CELL TRANSPLANT CANDIDATE: ICD-10-CM

## 2025-01-30 DIAGNOSIS — F31.5 BIPOLAR DISORDER, CURRENT EPISODE DEPRESSED, SEVERE, WITH PSYCHOTIC FEATURES (MULTI): ICD-10-CM

## 2025-01-30 DIAGNOSIS — R53.81 DEBILITATED PATIENT: Primary | ICD-10-CM

## 2025-01-30 DIAGNOSIS — D75.81 MYELOFIBROSIS (MULTI): ICD-10-CM

## 2025-01-30 DIAGNOSIS — E11.9 TYPE 2 DIABETES MELLITUS WITHOUT COMPLICATION, WITHOUT LONG-TERM CURRENT USE OF INSULIN (MULTI): ICD-10-CM

## 2025-01-30 DIAGNOSIS — Z51.11 ENCOUNTER FOR ANTINEOPLASTIC CHEMOTHERAPY: ICD-10-CM

## 2025-01-30 LAB
BB ANTIBODY IDENTIFICATION: NORMAL
CASE #: NORMAL
HLA CLS I TYP PNL BLD/T DONR HIGH RES: NORMAL
HLA RESULTS: NORMAL
HLA-DP2 QL: NORMAL
HLA-DQB1 HIGH RES: NORMAL
HLA-DRB1 HIGH RES: NORMAL

## 2025-01-30 ASSESSMENT — ENCOUNTER SYMPTOMS
CONSTIPATION: 0
DIZZINESS: 0
ACTIVITY CHANGE: 0
DYSPHORIC MOOD: 1
FATIGUE: 0
ARTHRALGIAS: 1
PALPITATIONS: 0
SORE THROAT: 0
STRIDOR: 0
NECK STIFFNESS: 0
DECREASED CONCENTRATION: 0
SPEECH DIFFICULTY: 0
COLOR CHANGE: 0
COUGH: 0
SINUS PAIN: 0
RHINORRHEA: 0
DYSURIA: 0
DIARRHEA: 0
HEADACHES: 0
ADENOPATHY: 0
TROUBLE SWALLOWING: 0
CONFUSION: 0
SLEEP DISTURBANCE: 0
ABDOMINAL DISTENTION: 0
PHOTOPHOBIA: 0
FLANK PAIN: 0
POLYPHAGIA: 0
FEVER: 0
EYE PAIN: 0
CHEST TIGHTNESS: 0
BLOOD IN STOOL: 0
MYALGIAS: 0
NERVOUS/ANXIOUS: 0
ABDOMINAL PAIN: 0
HEMATURIA: 0
POLYDIPSIA: 0
RECTAL PAIN: 0
APPETITE CHANGE: 0
SHORTNESS OF BREATH: 0
CONSTITUTIONAL NEGATIVE: 1
SINUS PRESSURE: 0
AGITATION: 0
BACK PAIN: 1
SEIZURES: 0

## 2025-01-30 NOTE — PROGRESS NOTES
MYLESW met with patient today during his infusion appointment.  Provided patient with a print out of his medication list and current physicians that he follows with.  Patient has an SRS medicaid interview scheduled on 1/29/25 - he will need this information for the interview.  MYLESW also provided patient with his current insurance information (Medicare A/B ID number) and his Medicaid QMB ID number.  Also encouraged patient again to reach out to Medicare to see if he has an active prescription plan.  Patient shared that he would.  He was able to complete a manufacture assistance application with Carmina Elder,  specialty pharmacy representative, who will work to submit the application for free drug assistance.  No other issues or concerns noted at this time. Social work will continue to follow.

## 2025-01-30 NOTE — PATIENT INSTRUCTIONS
Follow up in 6 weeks    Continue current medications and therapy for chronic medical conditions.    Patient was advised importance of proper diet/nutrition in addition adequate hydration. Patient was encouraged moderate exercise program to include 30 minutes daily for 5 days of the week or 150 minutes weekly. Patient will follow-up with us as scheduled.    I personally reviewed the [facility]AR[facility] report for this patient. I have considered the risks of abuse, dependence, addiction, and diversion.    UDS/CSA:    Obtain chairlift    Obtain shower chair    Obtain motorized wheelchair    Consult home health care     Patient

## 2025-01-30 NOTE — PROGRESS NOTES
Subjective   Patient ID: Jaycee Harp is a 65 y.o. male who presents for Supplies.    Consent obtained by Jaycee Harp for audio communication. Total time for this audio communication visit is 12 minutes.     Patient would like discuss a prescription for a wheel chair.    Patient would like discuss a shower chair prescription today.    Patient denies any symptoms or concerns today.         Review of Systems   Constitutional: Negative.  Negative for activity change, appetite change, fatigue and fever.   HENT:  Negative for congestion, dental problem, ear discharge, ear pain, mouth sores, rhinorrhea, sinus pressure, sinus pain, sore throat, tinnitus and trouble swallowing.    Eyes:  Negative for photophobia, pain and visual disturbance.   Respiratory:  Negative for cough, chest tightness, shortness of breath and stridor.    Cardiovascular:  Negative for chest pain and palpitations.   Gastrointestinal:  Negative for abdominal distention, abdominal pain, blood in stool, constipation, diarrhea and rectal pain.   Endocrine: Negative for cold intolerance, heat intolerance, polydipsia, polyphagia and polyuria.   Genitourinary:  Negative for dysuria, flank pain, hematuria and urgency.   Musculoskeletal:  Positive for arthralgias, back pain and gait problem. Negative for myalgias and neck stiffness.   Skin:  Negative for color change and rash.   Allergic/Immunologic: Negative for environmental allergies and food allergies.   Neurological:  Negative for dizziness, seizures, syncope, speech difficulty and headaches.   Hematological:  Negative for adenopathy.   Psychiatric/Behavioral:  Positive for dysphoric mood. Negative for agitation, confusion, decreased concentration and sleep disturbance. The patient is not nervous/anxious.        Objective   There were no vitals taken for this visit.    Physical Exam  Neurological:      Mental Status: He is alert and oriented to person, place, and time.   Psychiatric:         Behavior:  Behavior normal.         Thought Content: Thought content normal.         Judgment: Judgment normal.       Constitutional: Well developed, well nourished, alert and in no acute distress   Psychiatric: Mood calm and affect normal    Telephone Visit - Audio Communication Only     Assessment/Plan   Problem List Items Addressed This Visit             ICD-10-CM    Bipolar disorder, current episode depressed, severe, with psychotic features (Multi) F31.5     Stable/monitored         Type 2 diabetes mellitus without complication, without long-term current use of insulin (Multi) E11.9     Monitored          Other Visit Diagnoses         Codes    Debilitated patient    -  Primary R53.81    Relevant Orders    Shower chair    Wheelchair Motorized W/ Fixed Full Length Arms Leg Rests    Seat lift mechanism incorporated into a combination lift-chair mechanism          Consent obtained by Jaycee Harp for audio communication. Total time for this audio communication visit is 12 minutes.      Provider Attestation - Scribe documentation    All medical record entries made by the Scribe were at my direction and personally dictated by me. I have reviewed the chart and agree that the record accurately reflects my personal performance of the history, physical exam, discussion and plan.

## 2025-01-30 NOTE — PROGRESS NOTES
LSW continues to follow patient for support and ongoing assessment.  Spoke with patient today via phone to follow up on status of his wheelchair order.  Informed patient that I spoke with [facility] who confirmed that they received the order and they are planning to deliver the wheelchair on Tuesday 1/28/25 - informed patient that he will need to reach out to [employer] to confirm delivery address - provided their contact information.     Also followed up with patient to confirm that he was able to  his antibiotic that was prescribed earlier this week.  Patient stated that he hadn't yet and lost the saving card information that provided to him.  Emailed a copy of the savings card to his email.  Patient confirmed that he would pick it up tomorrow.     Patient shared that he has an [facility] medicaid interview next week and asked if LSW could assist with providing information on his medical team and his medication list.  LSW will plan to gather information for him and will plan to see him next week when he is in the clinic.     Patient appreciative of the call.  Social work will continue to follow.

## 2025-01-31 ENCOUNTER — APPOINTMENT (OUTPATIENT)
Dept: RADIOLOGY | Facility: HOSPITAL | Age: 65
End: 2025-01-31
Payer: MEDICARE

## 2025-01-31 ENCOUNTER — HOSPITAL ENCOUNTER (OUTPATIENT)
Dept: CARDIOLOGY | Facility: HOSPITAL | Age: 65
Discharge: HOME | End: 2025-01-31
Payer: MEDICARE

## 2025-01-31 LAB
DNA CLASS I + II VERIFICATION TYPING: NORMAL
HLA RESULTS: NORMAL

## 2025-01-31 PROCEDURE — C8929 TTE W OR WO FOL WCON,DOPPLER: HCPCS | Mod: GA

## 2025-01-31 PROCEDURE — 2500000004 HC RX 250 GENERAL PHARMACY W/ HCPCS (ALT 636 FOR OP/ED): Performed by: FAMILY MEDICINE

## 2025-01-31 RX ADMIN — PERFLUTREN 2 ML OF DILUTION: 6.52 INJECTION, SUSPENSION INTRAVENOUS at 10:36

## 2025-02-01 LAB
AORTIC VALVE MEAN GRADIENT: 4 MMHG
AORTIC VALVE PEAK VELOCITY: 1.45 M/S
AV PEAK GRADIENT: 8 MMHG
AVA (PEAK VEL): 2.75 CM2
AVA (VTI): 3.82 CM2
EJECTION FRACTION APICAL 4 CHAMBER: 62
EJECTION FRACTION: 63 %
LEFT ATRIUM VOLUME AREA LENGTH INDEX BSA: 26.1 ML/M2
LEFT VENTRICLE INTERNAL DIMENSION DIASTOLE: 4.72 CM (ref 3.5–6)
LEFT VENTRICULAR OUTFLOW TRACT DIAMETER: 2.15 CM
LV EJECTION FRACTION BIPLANE: 55 %
MITRAL VALVE E/A RATIO: 0.9
RIGHT VENTRICLE FREE WALL PEAK S': 13 CM/S
TRICUSPID ANNULAR PLANE SYSTOLIC EXCURSION: 2.5 CM

## 2025-02-03 ENCOUNTER — INFUSION (OUTPATIENT)
Dept: HEMATOLOGY/ONCOLOGY | Facility: CLINIC | Age: 65
End: 2025-02-03
Payer: MEDICARE

## 2025-02-03 ENCOUNTER — PHARMACY VISIT (OUTPATIENT)
Dept: PHARMACY | Facility: CLINIC | Age: 65
End: 2025-02-03
Payer: COMMERCIAL

## 2025-02-03 ENCOUNTER — SPECIALTY PHARMACY (OUTPATIENT)
Dept: PHARMACY | Facility: CLINIC | Age: 65
End: 2025-02-03

## 2025-02-03 ENCOUNTER — APPOINTMENT (OUTPATIENT)
Dept: PRIMARY CARE | Facility: CLINIC | Age: 65
End: 2025-02-03
Payer: MEDICARE

## 2025-02-03 ENCOUNTER — SOCIAL WORK (OUTPATIENT)
Dept: HEMATOLOGY/ONCOLOGY | Facility: CLINIC | Age: 65
End: 2025-02-03

## 2025-02-03 VITALS
HEART RATE: 102 BPM | WEIGHT: 303.35 LBS | BODY MASS INDEX: 46.51 KG/M2 | OXYGEN SATURATION: 99 % | SYSTOLIC BLOOD PRESSURE: 126 MMHG | TEMPERATURE: 98.8 F | DIASTOLIC BLOOD PRESSURE: 69 MMHG | RESPIRATION RATE: 16 BRPM

## 2025-02-03 DIAGNOSIS — D61.9 ANEMIA DUE TO BONE MARROW FAILURE, UNSPECIFIED BONE MARROW FAILURE TYPE (MULTI): ICD-10-CM

## 2025-02-03 DIAGNOSIS — C34.90 MALIGNANT NEOPLASM OF UNSPECIFIED PART OF UNSPECIFIED BRONCHUS OR LUNG (MULTI): ICD-10-CM

## 2025-02-03 DIAGNOSIS — D61.818 PANCYTOPENIA: ICD-10-CM

## 2025-02-03 DIAGNOSIS — D50.9 IRON DEFICIENCY ANEMIA, UNSPECIFIED IRON DEFICIENCY ANEMIA TYPE: ICD-10-CM

## 2025-02-03 DIAGNOSIS — I10 PRIMARY HYPERTENSION: Primary | ICD-10-CM

## 2025-02-03 DIAGNOSIS — D75.81 MYELOFIBROSIS (MULTI): ICD-10-CM

## 2025-02-03 DIAGNOSIS — J43.2 CENTRILOBULAR EMPHYSEMA (MULTI): ICD-10-CM

## 2025-02-03 DIAGNOSIS — F33.1 MODERATE EPISODE OF RECURRENT MAJOR DEPRESSIVE DISORDER: ICD-10-CM

## 2025-02-03 LAB
ALBUMIN SERPL BCP-MCNC: 4 G/DL (ref 3.4–5)
ALP SERPL-CCNC: 77 U/L (ref 33–136)
ALT SERPL W P-5'-P-CCNC: 130 U/L (ref 10–52)
ANION GAP SERPL CALC-SCNC: 12 MMOL/L (ref 10–20)
AST SERPL W P-5'-P-CCNC: 65 U/L (ref 9–39)
BASOPHILS # BLD AUTO: 0 X10*3/UL (ref 0–0.1)
BASOPHILS NFR BLD AUTO: 0 %
BILIRUB SERPL-MCNC: 0.6 MG/DL (ref 0–1.2)
BLOOD EXPIRATION DATE: NORMAL
BUN SERPL-MCNC: 18 MG/DL (ref 6–23)
CALCIUM SERPL-MCNC: 8.7 MG/DL (ref 8.6–10.3)
CHLORIDE SERPL-SCNC: 102 MMOL/L (ref 98–107)
CO2 SERPL-SCNC: 31 MMOL/L (ref 21–32)
CREAT SERPL-MCNC: 1.11 MG/DL (ref 0.5–1.3)
DISPENSE STATUS: NORMAL
EGFRCR SERPLBLD CKD-EPI 2021: 73 ML/MIN/1.73M*2
EOSINOPHIL # BLD AUTO: 0 X10*3/UL (ref 0–0.7)
EOSINOPHIL NFR BLD AUTO: 0 %
ERYTHROCYTE [DISTWIDTH] IN BLOOD BY AUTOMATED COUNT: 15.6 % (ref 11.5–14.5)
GLUCOSE SERPL-MCNC: 157 MG/DL (ref 74–99)
HCT VFR BLD AUTO: 17.7 % (ref 41–52)
HGB BLD-MCNC: 6.1 G/DL (ref 13.5–17.5)
IMM GRANULOCYTES # BLD AUTO: 0 X10*3/UL (ref 0–0.7)
IMM GRANULOCYTES NFR BLD AUTO: 0 % (ref 0–0.9)
LYMPHOCYTES # BLD AUTO: 0.96 X10*3/UL (ref 1.2–4.8)
LYMPHOCYTES NFR BLD AUTO: 81.4 %
MCH RBC QN AUTO: 31.6 PG (ref 26–34)
MCHC RBC AUTO-ENTMCNC: 34.5 G/DL (ref 32–36)
MCV RBC AUTO: 92 FL (ref 80–100)
MONOCYTES # BLD AUTO: 0.06 X10*3/UL (ref 0.1–1)
MONOCYTES NFR BLD AUTO: 5.1 %
NEUTROPHILS # BLD AUTO: 0.16 X10*3/UL (ref 1.2–7.7)
NEUTROPHILS NFR BLD AUTO: 13.5 %
NRBC BLD-RTO: ABNORMAL /100{WBCS}
OVALOCYTES BLD QL SMEAR: NORMAL
PLATELET # BLD AUTO: 7 X10*3/UL (ref 150–450)
POLYCHROMASIA BLD QL SMEAR: NORMAL
POTASSIUM SERPL-SCNC: 4.1 MMOL/L (ref 3.5–5.3)
PRODUCT BLOOD TYPE: 6200
PRODUCT CODE: NORMAL
PROT SERPL-MCNC: 7.6 G/DL (ref 6.4–8.2)
RBC # BLD AUTO: 1.93 X10*6/UL (ref 4.5–5.9)
RBC MORPH BLD: NORMAL
SODIUM SERPL-SCNC: 141 MMOL/L (ref 136–145)
UNIT ABO: NORMAL
UNIT NUMBER: NORMAL
UNIT RH: NORMAL
UNIT VOLUME: 286
WBC # BLD AUTO: 1.2 X10*3/UL (ref 4.4–11.3)

## 2025-02-03 PROCEDURE — 80053 COMPREHEN METABOLIC PANEL: CPT

## 2025-02-03 PROCEDURE — 86922 COMPATIBILITY TEST ANTIGLOB: CPT

## 2025-02-03 PROCEDURE — 85025 COMPLETE CBC W/AUTO DIFF WBC: CPT

## 2025-02-03 PROCEDURE — 36430 TRANSFUSION BLD/BLD COMPNT: CPT

## 2025-02-03 PROCEDURE — RXMED WILLOW AMBULATORY MEDICATION CHARGE

## 2025-02-03 PROCEDURE — P9073 PLATELETS PHERESIS PATH REDU: HCPCS

## 2025-02-03 RX ORDER — DIPHENHYDRAMINE HYDROCHLORIDE 50 MG/ML
50 INJECTION INTRAMUSCULAR; INTRAVENOUS AS NEEDED
Status: CANCELLED | OUTPATIENT
Start: 2025-02-03

## 2025-02-03 RX ORDER — FAMOTIDINE 10 MG/ML
20 INJECTION INTRAVENOUS ONCE AS NEEDED
Status: CANCELLED | OUTPATIENT
Start: 2025-02-03

## 2025-02-03 RX ORDER — HEPARIN SODIUM,PORCINE/PF 10 UNIT/ML
50 SYRINGE (ML) INTRAVENOUS AS NEEDED
Status: CANCELLED | OUTPATIENT
Start: 2025-02-03

## 2025-02-03 RX ORDER — BUDESONIDE, GLYCOPYRROLATE, AND FORMOTEROL FUMARATE 160; 9; 4.8 UG/1; UG/1; UG/1
2 AEROSOL, METERED RESPIRATORY (INHALATION)
Qty: 10.7 G | Refills: 2 | Status: SHIPPED | OUTPATIENT
Start: 2025-02-03

## 2025-02-03 RX ORDER — LOSARTAN POTASSIUM AND HYDROCHLOROTHIAZIDE 12.5; 5 MG/1; MG/1
1 TABLET ORAL DAILY
Qty: 90 TABLET | Refills: 1 | Status: SHIPPED | OUTPATIENT
Start: 2025-02-03

## 2025-02-03 RX ORDER — EPINEPHRINE 0.3 MG/.3ML
0.3 INJECTION SUBCUTANEOUS EVERY 5 MIN PRN
Status: CANCELLED | OUTPATIENT
Start: 2025-02-03

## 2025-02-03 RX ORDER — ALBUTEROL SULFATE 0.83 MG/ML
3 SOLUTION RESPIRATORY (INHALATION) AS NEEDED
Status: CANCELLED | OUTPATIENT
Start: 2025-02-03

## 2025-02-03 RX ORDER — HEPARIN 100 UNIT/ML
500 SYRINGE INTRAVENOUS AS NEEDED
Status: CANCELLED | OUTPATIENT
Start: 2025-02-03

## 2025-02-03 ASSESSMENT — ENCOUNTER SYMPTOMS
SORE THROAT: 0
ACTIVITY CHANGE: 0
DIZZINESS: 0
DECREASED CONCENTRATION: 0
DIARRHEA: 0
CONSTIPATION: 0
DYSURIA: 0
HEADACHES: 0
POLYPHAGIA: 0
CONFUSION: 0
ABDOMINAL PAIN: 0
SINUS PAIN: 0
ARTHRALGIAS: 0
HEMATURIA: 0
STRIDOR: 0
BLOOD IN STOOL: 0
APPETITE CHANGE: 0
RHINORRHEA: 0
COLOR CHANGE: 0
AGITATION: 0
SHORTNESS OF BREATH: 0
SEIZURES: 0
CONSTITUTIONAL NEGATIVE: 1
PHOTOPHOBIA: 0
TROUBLE SWALLOWING: 0
SINUS PRESSURE: 0
PALPITATIONS: 0
MYALGIAS: 0
CHEST TIGHTNESS: 0
NECK STIFFNESS: 0
ADENOPATHY: 0
FATIGUE: 0
EYE PAIN: 0
SPEECH DIFFICULTY: 0
POLYDIPSIA: 0
NERVOUS/ANXIOUS: 0
RECTAL PAIN: 0
FLANK PAIN: 0
FEVER: 0
ABDOMINAL DISTENTION: 0
COUGH: 0

## 2025-02-03 ASSESSMENT — PAIN SCALES - GENERAL: PAINLEVEL_OUTOF10: 0-NO PAIN

## 2025-02-03 NOTE — PROGRESS NOTES
LSW met with patient today for continued support and ongoing assessment.  Checked on patient's insurance status as patient was unsure if his [employer] coverage would start on 2/1/2025.  Confirmed that patient does have active coverage through [facility] (ID number [id number]).  LSW updated authorization team regarding the change in coverage as well as Mikayla Elder,  specialty pharmacy representative who will run patient's part D coverage for his Ojjaara script.  LSW updated patient that everything is running as active and encouraged him to reach out to [facility] to request an ID card as he has still not received anything in the mail.    In addition, the patient inquired about transportation for appointments.  He states that his mother-in-law fell this morning and he had a difficult time helping her up.  LSW provided information on [employer] program, which may match him to volunteers in the area to assist with transport.  LSW also stated that once his [facility] medicaid is active we could arrange transportation through that plan.      No other issues or concerns noted at this time.  LSW will continue to follow.

## 2025-02-03 NOTE — PROGRESS NOTES
Subjective   Patient ID: Jaycee Harp is a 65 y.o. male who presents for Depression.    HPI admits to feeling mildly depressed and is concerned with relationship with significant other.  Patient is undergoing treatment at this time for lung cancer    Review of Systems   Constitutional: Negative.  Negative for activity change, appetite change, fatigue and fever.   HENT:  Negative for congestion, dental problem, ear discharge, ear pain, mouth sores, rhinorrhea, sinus pressure, sinus pain, sore throat, tinnitus and trouble swallowing.    Eyes:  Negative for photophobia, pain and visual disturbance.   Respiratory:  Negative for cough, chest tightness, shortness of breath and stridor.    Cardiovascular:  Negative for chest pain and palpitations.   Gastrointestinal:  Negative for abdominal distention, abdominal pain, blood in stool, constipation, diarrhea and rectal pain.   Endocrine: Negative for cold intolerance, heat intolerance, polydipsia, polyphagia and polyuria.   Genitourinary:  Negative for dysuria, flank pain, hematuria and urgency.   Musculoskeletal:  Negative for arthralgias, gait problem, myalgias and neck stiffness.   Skin:  Negative for color change and rash.   Allergic/Immunologic: Negative for environmental allergies and food allergies.   Neurological:  Negative for dizziness, seizures, syncope, speech difficulty and headaches.   Hematological:  Negative for adenopathy.   Psychiatric/Behavioral:  Positive for dysphoric mood and sleep disturbance. Negative for agitation, confusion and decreased concentration. The patient is not nervous/anxious.        Objective   There were no vitals taken for this visit.    Physical Exam  Neurological:      Mental Status: He is alert.   Psychiatric:         Behavior: Behavior normal.         Thought Content: Thought content normal.         Judgment: Judgment normal.      Comments: Flat affect       Constitutional: Well developed, well nourished, alert and in no acute  distress   Psychiatric: Mood calm and affect normal    Telephone Visit - Audio Communication Only     Assessment/Plan   Problem List Items Addressed This Visit             ICD-10-CM    Primary hypertension - Primary I10    Relevant Medications    losartan-hydrochlorothiazide (Hyzaar) 50-12.5 mg tablet    Chronic obstructive pulmonary disease (Multi) J44.9    Relevant Medications    budesonide-glycopyr-formoterol (Breztri Aerosphere) 160-9-4.8 mcg/actuation HFA aerosol inhaler    Malignant neoplasm of unspecified part of unspecified bronchus or lung (Multi) C34.90     Monitored          Other Visit Diagnoses         Codes    Moderate episode of recurrent major depressive disorder     F33.1    Relevant Orders    Follow Up In [facility] - PCP - Established    Follow Up In [facility] - Behavioral Health Collaborative Care CoCM          Consent obtained by Jaycee Harp for audio communication. Total time for this audio communication visit is 12 minutes.      Provider Attestation - Scribe documentation    All medical record entries made by the Scribe were at my direction and personally dictated by me. I have reviewed the chart and agree that the record accurately reflects my personal performance of the history, physical exam, discussion and plan.

## 2025-02-03 NOTE — PATIENT INSTRUCTIONS
Follow up in 4 weeks    Continue current medications and therapy for chronic medical conditions.    Patient was advised importance of proper diet/nutrition in addition adequate hydration. Patient was encouraged moderate exercise program to include 30 minutes daily for 5 days of the week or 150 minutes weekly. Patient will follow-up with us as scheduled.    Referred to behavioral therapy

## 2025-02-04 ENCOUNTER — PATIENT OUTREACH (OUTPATIENT)
Dept: PRIMARY CARE | Facility: CLINIC | Age: 65
End: 2025-02-04
Payer: MEDICARE

## 2025-02-04 DIAGNOSIS — F31.5 BIPOLAR DISORDER, CURRENT EPISODE DEPRESSED, SEVERE, WITH PSYCHOTIC FEATURES (MULTI): ICD-10-CM

## 2025-02-04 DIAGNOSIS — I10 PRIMARY HYPERTENSION: ICD-10-CM

## 2025-02-04 LAB
ABO GROUP (TYPE) IN BLOOD: NORMAL
ANTIBODY SCREEN: NORMAL
RH FACTOR (ANTIGEN D): NORMAL

## 2025-02-04 RX ORDER — EPINEPHRINE 0.3 MG/.3ML
0.3 INJECTION SUBCUTANEOUS EVERY 5 MIN PRN
Status: CANCELLED | OUTPATIENT
Start: 2025-02-04

## 2025-02-04 RX ORDER — ALBUTEROL SULFATE 0.83 MG/ML
3 SOLUTION RESPIRATORY (INHALATION) AS NEEDED
Status: CANCELLED | OUTPATIENT
Start: 2025-02-04

## 2025-02-04 RX ORDER — DIPHENHYDRAMINE HYDROCHLORIDE 50 MG/ML
50 INJECTION INTRAMUSCULAR; INTRAVENOUS AS NEEDED
Status: CANCELLED | OUTPATIENT
Start: 2025-02-04

## 2025-02-04 RX ORDER — FAMOTIDINE 10 MG/ML
20 INJECTION INTRAVENOUS ONCE AS NEEDED
Status: CANCELLED | OUTPATIENT
Start: 2025-02-04

## 2025-02-04 NOTE — PROGRESS NOTES
Phone call to patient to introduce self and CCM program. No answer, left message requesting call back and provided direct contact number.

## 2025-02-05 ENCOUNTER — INFUSION (OUTPATIENT)
Dept: HEMATOLOGY/ONCOLOGY | Facility: CLINIC | Age: 65
End: 2025-02-05
Payer: MEDICARE

## 2025-02-05 ENCOUNTER — HOSPITAL ENCOUNTER (EMERGENCY)
Facility: HOSPITAL | Age: 65
Discharge: HOME | End: 2025-02-05
Attending: EMERGENCY MEDICINE
Payer: MEDICARE

## 2025-02-05 ENCOUNTER — APPOINTMENT (OUTPATIENT)
Dept: CARDIOLOGY | Facility: HOSPITAL | Age: 65
End: 2025-02-05
Payer: MEDICARE

## 2025-02-05 ENCOUNTER — APPOINTMENT (OUTPATIENT)
Dept: RADIOLOGY | Facility: HOSPITAL | Age: 65
End: 2025-02-05
Payer: MEDICARE

## 2025-02-05 VITALS
WEIGHT: 305 LBS | SYSTOLIC BLOOD PRESSURE: 114 MMHG | OXYGEN SATURATION: 99 % | BODY MASS INDEX: 49.02 KG/M2 | HEART RATE: 91 BPM | TEMPERATURE: 98.1 F | DIASTOLIC BLOOD PRESSURE: 55 MMHG | RESPIRATION RATE: 18 BRPM | HEIGHT: 66 IN

## 2025-02-05 VITALS
SYSTOLIC BLOOD PRESSURE: 125 MMHG | DIASTOLIC BLOOD PRESSURE: 59 MMHG | RESPIRATION RATE: 20 BRPM | TEMPERATURE: 98.6 F | HEART RATE: 82 BPM | WEIGHT: 309.53 LBS | OXYGEN SATURATION: 97 % | BODY MASS INDEX: 47.46 KG/M2

## 2025-02-05 DIAGNOSIS — D61.818 PANCYTOPENIA: ICD-10-CM

## 2025-02-05 DIAGNOSIS — R91.1 RIGHT UPPER LOBE PULMONARY NODULE: ICD-10-CM

## 2025-02-05 DIAGNOSIS — D61.9 ANEMIA DUE TO BONE MARROW FAILURE, UNSPECIFIED BONE MARROW FAILURE TYPE (MULTI): ICD-10-CM

## 2025-02-05 DIAGNOSIS — D75.81 MYELOFIBROSIS (MULTI): ICD-10-CM

## 2025-02-05 DIAGNOSIS — D64.9 LOW HEMOGLOBIN: Primary | ICD-10-CM

## 2025-02-05 LAB
ABO GROUP (TYPE) IN BLOOD: NORMAL
ALBUMIN SERPL BCP-MCNC: 3.7 G/DL (ref 3.4–5)
ALP SERPL-CCNC: 79 U/L (ref 33–136)
ALT SERPL W P-5'-P-CCNC: 125 U/L (ref 10–52)
ANION GAP SERPL CALC-SCNC: 8 MMOL/L (ref 10–20)
ANTIBODY SCREEN: NORMAL
AST SERPL W P-5'-P-CCNC: 64 U/L (ref 9–39)
ATRIAL RATE: 76 BPM
BASOPHILS # BLD AUTO: 0 X10*3/UL (ref 0–0.1)
BASOPHILS NFR BLD AUTO: 0 %
BB ANTIBODY IDENTIFICATION: NORMAL
BILIRUB SERPL-MCNC: 0.9 MG/DL (ref 0–1.2)
BLOOD EXPIRATION DATE: NORMAL
BNP SERPL-MCNC: 73 PG/ML (ref 0–99)
BUN SERPL-MCNC: 13 MG/DL (ref 6–23)
CALCIUM SERPL-MCNC: 8.5 MG/DL (ref 8.6–10.3)
CARDIAC TROPONIN I PNL SERPL HS: 4 NG/L (ref 0–20)
CARDIAC TROPONIN I PNL SERPL HS: 4 NG/L (ref 0–20)
CASE #: NORMAL
CHLORIDE SERPL-SCNC: 101 MMOL/L (ref 98–107)
CO2 SERPL-SCNC: 32 MMOL/L (ref 21–32)
CREAT SERPL-MCNC: 0.96 MG/DL (ref 0.5–1.3)
DISPENSE STATUS: NORMAL
EGFRCR SERPLBLD CKD-EPI 2021: 87 ML/MIN/1.73M*2
EOSINOPHIL # BLD AUTO: 0 X10*3/UL (ref 0–0.7)
EOSINOPHIL NFR BLD AUTO: 0 %
ERYTHROCYTE [DISTWIDTH] IN BLOOD BY AUTOMATED COUNT: 17.1 % (ref 11.5–14.5)
ERYTHROCYTE [DISTWIDTH] IN BLOOD BY AUTOMATED COUNT: 17.7 % (ref 11.5–14.5)
GLUCOSE SERPL-MCNC: 94 MG/DL (ref 74–99)
HCT VFR BLD AUTO: 16.8 % (ref 41–52)
HCT VFR BLD AUTO: 16.9 % (ref 41–52)
HGB BLD-MCNC: 5.5 G/DL (ref 13.5–17.5)
HGB BLD-MCNC: 5.7 G/DL (ref 13.5–17.5)
IMM GRANULOCYTES # BLD AUTO: 0 X10*3/UL (ref 0–0.7)
IMM GRANULOCYTES NFR BLD AUTO: 0 % (ref 0–0.9)
LYMPHOCYTES # BLD AUTO: 1.4 X10*3/UL (ref 1.2–4.8)
LYMPHOCYTES NFR BLD AUTO: 87 %
MCH RBC QN AUTO: 29.6 PG (ref 26–34)
MCH RBC QN AUTO: 30.6 PG (ref 26–34)
MCHC RBC AUTO-ENTMCNC: 32.7 G/DL (ref 32–36)
MCHC RBC AUTO-ENTMCNC: 33.7 G/DL (ref 32–36)
MCV RBC AUTO: 90 FL (ref 80–100)
MCV RBC AUTO: 91 FL (ref 80–100)
MONOCYTES # BLD AUTO: 0.12 X10*3/UL (ref 0.1–1)
MONOCYTES NFR BLD AUTO: 7.5 %
NEUTROPHILS # BLD AUTO: 0.09 X10*3/UL (ref 1.2–7.7)
NEUTROPHILS NFR BLD AUTO: 5.5 %
NRBC BLD-RTO: 1.1 /100 WBCS (ref 0–0)
P AXIS: 59 DEGREES
P OFFSET: 199 MS
P ONSET: 148 MS
PLATELET # BLD AUTO: 19 X10*3/UL (ref 150–450)
PLATELET # BLD AUTO: 26 X10*3/UL (ref 150–450)
POTASSIUM SERPL-SCNC: 4 MMOL/L (ref 3.5–5.3)
PR INTERVAL: 154 MS
PRODUCT BLOOD TYPE: 9500
PRODUCT CODE: NORMAL
PROT SERPL-MCNC: 7.2 G/DL (ref 6.4–8.2)
Q ONSET: 225 MS
QRS COUNT: 12 BEATS
QRS DURATION: 88 MS
QT INTERVAL: 368 MS
QTC CALCULATION(BAZETT): 414 MS
QTC FREDERICIA: 398 MS
R AXIS: 59 DEGREES
RBC # BLD AUTO: 1.86 X10*6/UL (ref 4.5–5.9)
RBC # BLD AUTO: 1.86 X10*6/UL (ref 4.5–5.9)
RH FACTOR (ANTIGEN D): NORMAL
SODIUM SERPL-SCNC: 137 MMOL/L (ref 136–145)
T AXIS: 66 DEGREES
T OFFSET: 409 MS
UNIT ABO: NORMAL
UNIT NUMBER: NORMAL
UNIT RH: NORMAL
UNIT VOLUME: 287
VENTRICULAR RATE: 76 BPM
WBC # BLD AUTO: 1.6 X10*3/UL (ref 4.4–11.3)
WBC # BLD AUTO: 1.9 X10*3/UL (ref 4.4–11.3)
XM INTEP: NORMAL

## 2025-02-05 PROCEDURE — 36415 COLL VENOUS BLD VENIPUNCTURE: CPT

## 2025-02-05 PROCEDURE — 86850 RBC ANTIBODY SCREEN: CPT

## 2025-02-05 PROCEDURE — 86870 RBC ANTIBODY IDENTIFICATION: CPT

## 2025-02-05 PROCEDURE — 83880 ASSAY OF NATRIURETIC PEPTIDE: CPT

## 2025-02-05 PROCEDURE — 99285 EMERGENCY DEPT VISIT HI MDM: CPT | Mod: 25 | Performed by: EMERGENCY MEDICINE

## 2025-02-05 PROCEDURE — 36430 TRANSFUSION BLD/BLD COMPNT: CPT

## 2025-02-05 PROCEDURE — 84075 ASSAY ALKALINE PHOSPHATASE: CPT

## 2025-02-05 PROCEDURE — 80053 COMPREHEN METABOLIC PANEL: CPT

## 2025-02-05 PROCEDURE — 86901 BLOOD TYPING SEROLOGIC RH(D): CPT

## 2025-02-05 PROCEDURE — 71045 X-RAY EXAM CHEST 1 VIEW: CPT | Performed by: STUDENT IN AN ORGANIZED HEALTH CARE EDUCATION/TRAINING PROGRAM

## 2025-02-05 PROCEDURE — 84484 ASSAY OF TROPONIN QUANT: CPT

## 2025-02-05 PROCEDURE — 71045 X-RAY EXAM CHEST 1 VIEW: CPT

## 2025-02-05 PROCEDURE — 94640 AIRWAY INHALATION TREATMENT: CPT

## 2025-02-05 PROCEDURE — 99285 EMERGENCY DEPT VISIT HI MDM: CPT

## 2025-02-05 PROCEDURE — P9040 RBC LEUKOREDUCED IRRADIATED: HCPCS

## 2025-02-05 PROCEDURE — 85025 COMPLETE CBC W/AUTO DIFF WBC: CPT

## 2025-02-05 PROCEDURE — 2500000002 HC RX 250 W HCPCS SELF ADMINISTERED DRUGS (ALT 637 FOR MEDICARE OP, ALT 636 FOR OP/ED)

## 2025-02-05 PROCEDURE — 2500000001 HC RX 250 WO HCPCS SELF ADMINISTERED DRUGS (ALT 637 FOR MEDICARE OP)

## 2025-02-05 PROCEDURE — 93005 ELECTROCARDIOGRAM TRACING: CPT

## 2025-02-05 PROCEDURE — 85027 COMPLETE CBC AUTOMATED: CPT | Performed by: EMERGENCY MEDICINE

## 2025-02-05 PROCEDURE — 93010 ELECTROCARDIOGRAM REPORT: CPT | Performed by: EMERGENCY MEDICINE

## 2025-02-05 RX ORDER — FAMOTIDINE 10 MG/ML
20 INJECTION INTRAVENOUS ONCE AS NEEDED
Status: CANCELLED | OUTPATIENT
Start: 2025-02-05

## 2025-02-05 RX ORDER — EPINEPHRINE 0.3 MG/.3ML
0.3 INJECTION SUBCUTANEOUS EVERY 5 MIN PRN
Status: CANCELLED | OUTPATIENT
Start: 2025-02-05

## 2025-02-05 RX ORDER — DIPHENHYDRAMINE HYDROCHLORIDE 50 MG/ML
50 INJECTION INTRAMUSCULAR; INTRAVENOUS AS NEEDED
Status: CANCELLED | OUTPATIENT
Start: 2025-02-05

## 2025-02-05 RX ORDER — ALBUTEROL SULFATE 0.83 MG/ML
3 SOLUTION RESPIRATORY (INHALATION) AS NEEDED
Status: CANCELLED | OUTPATIENT
Start: 2025-02-05

## 2025-02-05 RX ORDER — IPRATROPIUM BROMIDE AND ALBUTEROL SULFATE 2.5; .5 MG/3ML; MG/3ML
3 SOLUTION RESPIRATORY (INHALATION) ONCE
Status: COMPLETED | OUTPATIENT
Start: 2025-02-05 | End: 2025-02-05

## 2025-02-05 RX ADMIN — IPRATROPIUM BROMIDE AND ALBUTEROL SULFATE 3 ML: 2.5; .5 SOLUTION RESPIRATORY (INHALATION) at 17:12

## 2025-02-05 RX ADMIN — LOSARTAN POTASSIUM: 50 TABLET, FILM COATED ORAL at 14:50

## 2025-02-05 ASSESSMENT — HEART SCORE
ECG: NORMAL
HISTORY: SLIGHTLY SUSPICIOUS
HEART SCORE: 3
TROPONIN: LESS THAN OR EQUAL TO NORMAL LIMIT
RISK FACTORS: 1-2 RISK FACTORS
AGE: 65+

## 2025-02-05 ASSESSMENT — LIFESTYLE VARIABLES
EVER HAD A DRINK FIRST THING IN THE MORNING TO STEADY YOUR NERVES TO GET RID OF A HANGOVER: NO
HAVE PEOPLE ANNOYED YOU BY CRITICIZING YOUR DRINKING: NO
TOTAL SCORE: 0
EVER FELT BAD OR GUILTY ABOUT YOUR DRINKING: NO
HAVE YOU EVER FELT YOU SHOULD CUT DOWN ON YOUR DRINKING: NO

## 2025-02-05 ASSESSMENT — PAIN - FUNCTIONAL ASSESSMENT: PAIN_FUNCTIONAL_ASSESSMENT: 0-10

## 2025-02-05 ASSESSMENT — COLUMBIA-SUICIDE SEVERITY RATING SCALE - C-SSRS
2. HAVE YOU ACTUALLY HAD ANY THOUGHTS OF KILLING YOURSELF?: NO
6. HAVE YOU EVER DONE ANYTHING, STARTED TO DO ANYTHING, OR PREPARED TO DO ANYTHING TO END YOUR LIFE?: NO
1. IN THE PAST MONTH, HAVE YOU WISHED YOU WERE DEAD OR WISHED YOU COULD GO TO SLEEP AND NOT WAKE UP?: NO

## 2025-02-05 ASSESSMENT — PAIN SCALES - GENERAL
PAINLEVEL_OUTOF10: 0 - NO PAIN
PAINLEVEL_OUTOF10: 0 - NO PAIN
PAINLEVEL_OUTOF10: 0-NO PAIN

## 2025-02-05 NOTE — ED PROVIDER NOTES
"HPI   Chief Complaint   Patient presents with    low Hb     Sent by lesly for \"drop in Hb after transfusion\"       Patient is a 65-year-old male with a past medical history significant for myelofibrosis on biologic pills, lung nodule, COPD, HTN, and chronic pancreatitis presenting to the ED with low hemoglobin.  Patient states he was at his infusion appointment today when his hemoglobin was found to be lower than usual and he was sent here for evaluation.  Patient gets weekly blood draws and transfusions.  He endorses feeling scared.  He also states he feels symptoms consistent with when his hemoglobin is low which he states is lightheadedness and fatigue.  Patient does tell me he had an issue with his insurance and has not had any of his pills for the month yet.  He did just get refills and has them all at his house.  Patient is not on any chemo or radiation.  Outside of the lightheadedness and fatigue, patient denies any current symptoms at this time.  He denies any pain, dizziness, vision changes, chest pain, shortness of breath, diaphoresis, abdominal pain, nausea/vomiting, or changes in urinary/bowel habits.  Of note, patient is supposed to be on 3 L nasal cannula at baseline.              Patient History   Past Medical History:   Diagnosis Date    Contact with and (suspected) exposure to covid-19 08/28/2020    Exposure to 2019 novel coronavirus    Hematuria 10/12/2023    Personal history of other endocrine, nutritional and metabolic disease 01/13/2021    History of obesity    Personal history of other specified conditions 08/28/2020    History of nasal congestion     Past Surgical History:   Procedure Laterality Date    OTHER SURGICAL HISTORY  10/31/2019    Rectal surgery     Family History   Problem Relation Name Age of Onset    Diabetes Mother      Other (varicose veins) Mother      Liver cancer Mother      Diabetes Father      Tuberculosis Father       Social History     Tobacco Use    Smoking status: " Former     Types: Cigarettes     Start date: 2024     Quit date: 1971     Years since quittin.1     Passive exposure: Past    Smokeless tobacco: Former   Substance Use Topics    Alcohol use: Not Currently     Alcohol/week: 24.0 standard drinks of alcohol     Types: 12 Cans of beer, 12 Shots of liquor per week    Drug use: Yes     Types: Marijuana     Comment: edibles       Physical Exam   ED Triage Vitals [25 1400]   Temperature Heart Rate Respirations BP   37 °C (98.6 °F) 88 16 133/59      Pulse Ox Temp Source Heart Rate Source Patient Position   99 % Temporal Monitor Sitting      BP Location FiO2 (%)     Right arm --       Physical Exam  Vitals reviewed.   Constitutional:       General: He is not in acute distress.     Appearance: He is not ill-appearing.   Eyes:      Comments: Bilateral pale conjunctiva   Cardiovascular:      Rate and Rhythm: Normal rate.   Pulmonary:      Effort: Pulmonary effort is normal. No respiratory distress.      Breath sounds: Normal breath sounds. No wheezing, rhonchi or rales.   Abdominal:      General: Bowel sounds are normal.      Palpations: Abdomen is soft.      Tenderness: There is no abdominal tenderness. There is no guarding or rebound.   Musculoskeletal:      Comments: 2+ pitting edema of bilateral lower extremities.  No erythema, warmth, or tenderness.  Passively moving all extremities without pain.   Skin:     General: Skin is warm and dry.   Neurological:      General: No focal deficit present.      Mental Status: He is alert.           ED Course & MDM   Diagnoses as of 25   Low hemoglobin   Pancytopenia     Labs Reviewed   COMPREHENSIVE METABOLIC PANEL - Abnormal       Result Value    Glucose 94      Sodium 137      Potassium 4.0      Chloride 101      Bicarbonate 32      Anion Gap 8 (*)     Urea Nitrogen 13      Creatinine 0.96      eGFR 87      Calcium 8.5 (*)     Albumin 3.7      Alkaline Phosphatase 79      Total Protein 7.2      AST 64 (*)      Bilirubin, Total 0.9       (*)    TYPE AND SCREEN    ABO TYPE O      Rh TYPE POS      ANTIBODY SCREEN POS     BB ORDER ONLY - ANTIBODY IDENTIFICATION   TROPONIN SERIES- (INITIAL, 1 HR)   B-TYPE NATRIURETIC PEPTIDE   PREPARE RBC     XR chest 1 view    (Results Pending)               No data recorded     [place] Coma Scale Score: 15 (02/05/25 1504 : Regan Borjas RN)                           Medical Decision Making  Patient is a 65-year-old male with a past medical history significant for myelofibrosis on biologic pills, lung nodule, COPD, HTN, and chronic pancreatitis presenting to the ED with low hemoglobin.  History was obtained from the patient as well as his oncologist.  Went to infusion today in which his hemoglobin was found to be abnormally low.  Sent here for further evaluation.  He is not on chemo or radiation.  Gets blood draws and transfusions weekly.  Endorses symptoms consistent with when his hemoglobin is low consisting of lightheadedness and fatigue.  States he did not have any of his pills for the month due to an insurance issue, but got them refilled today and they are at his house.  Has not taken any of them yet.  Patient is on 3 L nasal cannula at baseline.  He denies any symptoms at this time including pain, dizziness, chest pain, shortness of breath, or other as noted in HPI.  On physical exam, patient is sitting comfortably and in no apparent distress.  Bilateral conjunctive are pale.  Lungs clear bilaterally and breathing comfortably on 3 L NC without respiratory distress or extrapulmonary sounds.  There is 2+ pitting edema the bilateral lower extremities without erythema, warmth, or tenderness.  Passively moving all extremities without pain.  Remainder of exam as noted above.  Patient is afebrile with stable vital signs.    I got in touch with Dr. Beebe who is the patient's oncologist.  He states the patient was sent here for his low hemoglobin, although he is also concerned about  the patient endorsing chest pain.  The patient did get transfused with a unit of blood today, although his hemoglobin dropped from 6.1 to 5.7 afterwards.  He states when the patient was at his infusion appointment today, he kept telling the staff how he was experiencing significant chest pain and shortness of breath which also kept him up all night yesterday.    (Patient did not mention any of this to us here in the ED.)    Workup obtained including EKG, CBC, CMP, BNP, troponin, type and screen, and chest x-ray.  Blood product consent obtained.  I did give him a dose of his combination hypertensive and diuretic here today.  EKG reveals normal sinus rhythm with normal axis and normal intervals.  No evidence of ischemia.  Ventricular rate of 76 and normal QTc of 414.  No prior for comparison.  CBC this morning with pancytopenia.  WBC is 1.6, hemoglobin 5.7, and platelets 19.  Review of patient's chart reveals he has hemoglobin transfuse was under 7 and platelets when they are under 10.  BNP normal.  CMP with elevated AST and ALT, although at patient's baseline.  2 sets of troponin normal at 4.  Repeat CBC this evening with WBCs of 1.9, hemoglobin 5.5, and platelets 26.  Chest x-ray with suspected CHF with interstitial edema.  We did pull out the patient's previous chest x-rays it appears the one from 2021 looked almost the exact same.  I had a provider from the blood bank come talk to me.  She states that patient's blood is slightly complicated due to the antibodies.  She states that there would not be blood ready today, and if he was admitted, it would be ready at some point tomorrow.  I again messaged Dr. Beebe.  He told me that if his chest pain/shortness of breath workup is negative, he would recommend the patient be discharged and present to [facility] to have his transfusion tomorrow.  He states this is much easier because they do not have to repeat the whole type and screen process.  He did recommend we give the  patient a breathing treatment due to the patient's baseline COPD.  Therefore, DuoNeb ordered.  Upon final reassessment, patient states he is completely asymptomatic.  He states he had something to eat and no longer feels fatigued and lightheaded.  He continues to deny to us any chest pain, shortness of breath, or other associated symptoms.  Given his benign cardiac workup, we will discharge him this evening.  Gave him strict instructions to continue all of his home medications.  He is to call Dr. Beebe's office first thing tomorrow to figure out a time to go in for his infusion.  Patient verbalized understanding.  He and his family are agreeable to the plan and all of their questions were answered to satisfaction.  Patient was discharged in stable condition.    ===============================  ATTENDING ATTESTATION:    This patient was seen by the advanced practice provider.  I have personally performed a substantive portion of the encounter.  I have seen and examined the patient; agree with the workup, evaluation, MDM, management and diagnosis.  The care plan has been discussed.      I personally saw the patient and made/approved the management plan    65 male to ER with chief complaint of lightheadedness and fatigue.  He is on 3 L of oxygen at baseline.  He was at hematology office today and received a blood transfusion of 1 unit and they repeated his blood counts and it went down from yesterday after the transfusion.  Patient has a history of myelofibrosis.  He has a history of a lung nodule.  He is not any chemo or radiation.  He does have a history of pancytopenia.  He normally has a hemoglobin of 6-7.  States he normally gets a transfusion every week or so.  States he did not require transfusion last week.  And then he was out of his Ojjaara that he takes for his myelofibrosis.  Here patient denies any chest pain or shortness of breath.  He states he did get 1 unit of blood at the oncology office.  We did  contact Dr Beebe and he reports patient repeatedly complained of chest pain and shortness of breath.  He did confirm that the patient's blood count dropped in spite of getting the blood transfusion.    Cardiac labs are ordered here.    Patient does have pale conjunctival.  Heart regular.  Lungs clear.  Abdomen soft and nontender.  No distress.    EKG: Normal sinus rhythm with a ventricular rate of 76.  .  QTc 414.  No ST changes.  ===============================          Procedure  Procedures     Korin Pompa PA-C  02/05/25 2016

## 2025-02-06 ENCOUNTER — TELEPHONE (OUTPATIENT)
Dept: HEMATOLOGY/ONCOLOGY | Facility: CLINIC | Age: 65
End: 2025-02-06
Payer: MEDICAID

## 2025-02-06 ENCOUNTER — INFUSION (OUTPATIENT)
Dept: HEMATOLOGY/ONCOLOGY | Facility: CLINIC | Age: 65
End: 2025-02-06
Payer: MEDICAID

## 2025-02-06 VITALS
RESPIRATION RATE: 18 BRPM | SYSTOLIC BLOOD PRESSURE: 116 MMHG | HEART RATE: 75 BPM | TEMPERATURE: 98.4 F | DIASTOLIC BLOOD PRESSURE: 69 MMHG | BODY MASS INDEX: 49.96 KG/M2 | OXYGEN SATURATION: 98 % | WEIGHT: 309.53 LBS

## 2025-02-06 DIAGNOSIS — D61.9 ANEMIA DUE TO BONE MARROW FAILURE, UNSPECIFIED BONE MARROW FAILURE TYPE (MULTI): ICD-10-CM

## 2025-02-06 DIAGNOSIS — D75.81 MYELOFIBROSIS (MULTI): ICD-10-CM

## 2025-02-06 LAB
BASOPHILS # BLD AUTO: 0 X10*3/UL (ref 0–0.1)
BASOPHILS NFR BLD AUTO: 0 %
BB ANTIBODY IDENTIFICATION: NORMAL
BLOOD EXPIRATION DATE: NORMAL
CASE #: NORMAL
DISPENSE STATUS: NORMAL
EOSINOPHIL # BLD AUTO: 0 X10*3/UL (ref 0–0.7)
EOSINOPHIL NFR BLD AUTO: 0 %
ERYTHROCYTE [DISTWIDTH] IN BLOOD BY AUTOMATED COUNT: 17 % (ref 11.5–14.5)
HCT VFR BLD AUTO: 18.5 % (ref 41–52)
HGB BLD-MCNC: 6.3 G/DL (ref 13.5–17.5)
IMM GRANULOCYTES # BLD AUTO: 0 X10*3/UL (ref 0–0.7)
IMM GRANULOCYTES NFR BLD AUTO: 0 % (ref 0–0.9)
LYMPHOCYTES # BLD AUTO: 1.48 X10*3/UL (ref 1.2–4.8)
LYMPHOCYTES NFR BLD AUTO: 89.2 %
MCH RBC QN AUTO: 30.6 PG (ref 26–34)
MCHC RBC AUTO-ENTMCNC: 34.1 G/DL (ref 32–36)
MCV RBC AUTO: 90 FL (ref 80–100)
MONOCYTES # BLD AUTO: 0.08 X10*3/UL (ref 0.1–1)
MONOCYTES NFR BLD AUTO: 4.8 %
NEUTROPHILS # BLD AUTO: 0.1 X10*3/UL (ref 1.2–7.7)
NEUTROPHILS NFR BLD AUTO: 6 %
NRBC BLD-RTO: ABNORMAL /100{WBCS}
PLATELET # BLD AUTO: 15 X10*3/UL (ref 150–450)
PRODUCT BLOOD TYPE: 6200
PRODUCT BLOOD TYPE: 9500
PRODUCT BLOOD TYPE: 9500
PRODUCT CODE: NORMAL
RBC # BLD AUTO: 2.06 X10*6/UL (ref 4.5–5.9)
UNIT ABO: NORMAL
UNIT NUMBER: NORMAL
UNIT RH: NORMAL
UNIT VOLUME: 265
UNIT VOLUME: 284
UNIT VOLUME: 286
WBC # BLD AUTO: 1.7 X10*3/UL (ref 4.4–11.3)
XM INTEP: NORMAL
XM INTEP: NORMAL

## 2025-02-06 PROCEDURE — P9040 RBC LEUKOREDUCED IRRADIATED: HCPCS

## 2025-02-06 PROCEDURE — 96374 THER/PROPH/DIAG INJ IV PUSH: CPT | Mod: INF

## 2025-02-06 PROCEDURE — 2500000004 HC RX 250 GENERAL PHARMACY W/ HCPCS (ALT 636 FOR OP/ED): Performed by: INTERNAL MEDICINE

## 2025-02-06 PROCEDURE — 85025 COMPLETE CBC W/AUTO DIFF WBC: CPT

## 2025-02-06 PROCEDURE — 36430 TRANSFUSION BLD/BLD COMPNT: CPT

## 2025-02-06 RX ORDER — ALBUTEROL SULFATE 0.83 MG/ML
3 SOLUTION RESPIRATORY (INHALATION) AS NEEDED
Status: CANCELLED | OUTPATIENT
Start: 2025-02-06

## 2025-02-06 RX ORDER — HEPARIN SODIUM,PORCINE/PF 10 UNIT/ML
50 SYRINGE (ML) INTRAVENOUS AS NEEDED
Status: DISCONTINUED | OUTPATIENT
Start: 2025-02-06 | End: 2025-02-06 | Stop reason: HOSPADM

## 2025-02-06 RX ORDER — ALBUTEROL SULFATE 0.83 MG/ML
3 SOLUTION RESPIRATORY (INHALATION) AS NEEDED
OUTPATIENT
Start: 2025-02-06

## 2025-02-06 RX ORDER — FAMOTIDINE 10 MG/ML
20 INJECTION INTRAVENOUS ONCE AS NEEDED
OUTPATIENT
Start: 2025-02-06

## 2025-02-06 RX ORDER — DIPHENHYDRAMINE HYDROCHLORIDE 50 MG/ML
50 INJECTION INTRAMUSCULAR; INTRAVENOUS AS NEEDED
Status: CANCELLED | OUTPATIENT
Start: 2025-02-06

## 2025-02-06 RX ORDER — FAMOTIDINE 10 MG/ML
20 INJECTION INTRAVENOUS ONCE AS NEEDED
Status: CANCELLED | OUTPATIENT
Start: 2025-02-06

## 2025-02-06 RX ORDER — HEPARIN SODIUM,PORCINE/PF 10 UNIT/ML
50 SYRINGE (ML) INTRAVENOUS AS NEEDED
OUTPATIENT
Start: 2025-02-06

## 2025-02-06 RX ORDER — EPINEPHRINE 0.3 MG/.3ML
0.3 INJECTION SUBCUTANEOUS EVERY 5 MIN PRN
Status: CANCELLED | OUTPATIENT
Start: 2025-02-06

## 2025-02-06 RX ORDER — FUROSEMIDE 10 MG/ML
20 INJECTION INTRAMUSCULAR; INTRAVENOUS AS NEEDED
OUTPATIENT
Start: 2025-02-06

## 2025-02-06 RX ORDER — FUROSEMIDE 10 MG/ML
20 INJECTION INTRAMUSCULAR; INTRAVENOUS AS NEEDED
Status: CANCELLED | OUTPATIENT
Start: 2025-02-06

## 2025-02-06 RX ORDER — DIPHENHYDRAMINE HYDROCHLORIDE 50 MG/ML
50 INJECTION INTRAMUSCULAR; INTRAVENOUS AS NEEDED
OUTPATIENT
Start: 2025-02-06

## 2025-02-06 RX ORDER — HEPARIN 100 UNIT/ML
500 SYRINGE INTRAVENOUS AS NEEDED
OUTPATIENT
Start: 2025-02-06

## 2025-02-06 RX ORDER — HEPARIN 100 UNIT/ML
500 SYRINGE INTRAVENOUS AS NEEDED
Status: DISCONTINUED | OUTPATIENT
Start: 2025-02-06 | End: 2025-02-06 | Stop reason: HOSPADM

## 2025-02-06 RX ORDER — FUROSEMIDE 10 MG/ML
20 INJECTION INTRAMUSCULAR; INTRAVENOUS AS NEEDED
Status: DISCONTINUED | OUTPATIENT
Start: 2025-02-06 | End: 2025-02-06 | Stop reason: HOSPADM

## 2025-02-06 RX ORDER — EPINEPHRINE 0.3 MG/.3ML
0.3 INJECTION SUBCUTANEOUS EVERY 5 MIN PRN
OUTPATIENT
Start: 2025-02-06

## 2025-02-06 RX ADMIN — FUROSEMIDE 20 MG: 10 INJECTION, SOLUTION INTRAMUSCULAR; INTRAVENOUS at 15:11

## 2025-02-06 NOTE — DISCHARGE INSTRUCTIONS
Your hemoglobin remains very low and in the fives.  The other labs and workup we obtained was unremarkable.  I got in touch with Dr. Beebe who recommended you be discharged and present to [facility] tomorrow for a transfusion.  Please call his clinic first thing tomorrow morning when they open to get a timeframe for going to the transfusion.

## 2025-02-06 NOTE — TELEPHONE ENCOUNTER
Pt was discharged from ER and is to report for bld transfusion today at 1 pm.  I spoke with pt and he is aware of transfusion apt scheduled for today at 1 pm.  He voiced understanding.

## 2025-02-07 LAB — PATH REVIEW-CBC DIFFERENTIAL: NORMAL

## 2025-02-09 PROBLEM — C34.90 MALIGNANT NEOPLASM OF UNSPECIFIED PART OF UNSPECIFIED BRONCHUS OR LUNG (MULTI): Status: ACTIVE | Noted: 2025-02-09

## 2025-02-09 ASSESSMENT — ENCOUNTER SYMPTOMS
SLEEP DISTURBANCE: 1
DYSPHORIC MOOD: 1

## 2025-02-10 ENCOUNTER — TELEPHONE (OUTPATIENT)
Dept: PRIMARY CARE | Facility: CLINIC | Age: 65
End: 2025-02-10
Payer: MEDICARE

## 2025-02-10 ENCOUNTER — SOCIAL WORK (OUTPATIENT)
Dept: HEMATOLOGY/ONCOLOGY | Facility: CLINIC | Age: 65
End: 2025-02-10

## 2025-02-10 ENCOUNTER — INFUSION (OUTPATIENT)
Dept: HEMATOLOGY/ONCOLOGY | Facility: CLINIC | Age: 65
End: 2025-02-10
Payer: MEDICARE

## 2025-02-10 VITALS
DIASTOLIC BLOOD PRESSURE: 73 MMHG | WEIGHT: 306.66 LBS | SYSTOLIC BLOOD PRESSURE: 149 MMHG | BODY MASS INDEX: 49.5 KG/M2 | OXYGEN SATURATION: 94 % | TEMPERATURE: 97.5 F | RESPIRATION RATE: 17 BRPM | HEART RATE: 91 BPM

## 2025-02-10 DIAGNOSIS — D61.818 PANCYTOPENIA: ICD-10-CM

## 2025-02-10 DIAGNOSIS — D61.9 ANEMIA DUE TO BONE MARROW FAILURE, UNSPECIFIED BONE MARROW FAILURE TYPE (MULTI): ICD-10-CM

## 2025-02-10 DIAGNOSIS — D75.81 MYELOFIBROSIS (MULTI): ICD-10-CM

## 2025-02-10 DIAGNOSIS — D50.9 IRON DEFICIENCY ANEMIA, UNSPECIFIED IRON DEFICIENCY ANEMIA TYPE: ICD-10-CM

## 2025-02-10 LAB
ABO GROUP (TYPE) IN BLOOD: NORMAL
ALBUMIN SERPL BCP-MCNC: 4 G/DL (ref 3.4–5)
ALP SERPL-CCNC: 82 U/L (ref 33–136)
ALT SERPL W P-5'-P-CCNC: 85 U/L (ref 10–52)
ANION GAP SERPL CALC-SCNC: 11 MMOL/L (ref 10–20)
ANTIBODY SCREEN: NORMAL
AST SERPL W P-5'-P-CCNC: 38 U/L (ref 9–39)
BASOPHILS # BLD AUTO: 0 X10*3/UL (ref 0–0.1)
BASOPHILS NFR BLD AUTO: 0 %
BILIRUB SERPL-MCNC: 0.6 MG/DL (ref 0–1.2)
BUN SERPL-MCNC: 16 MG/DL (ref 6–23)
CALCIUM SERPL-MCNC: 8.8 MG/DL (ref 8.6–10.3)
CHLORIDE SERPL-SCNC: 97 MMOL/L (ref 98–107)
CO2 SERPL-SCNC: 32 MMOL/L (ref 21–32)
CREAT SERPL-MCNC: 1.1 MG/DL (ref 0.5–1.3)
EGFRCR SERPLBLD CKD-EPI 2021: 74 ML/MIN/1.73M*2
EOSINOPHIL # BLD AUTO: 0 X10*3/UL (ref 0–0.7)
EOSINOPHIL NFR BLD AUTO: 0 %
ERYTHROCYTE [DISTWIDTH] IN BLOOD BY AUTOMATED COUNT: 15.3 % (ref 11.5–14.5)
GLUCOSE SERPL-MCNC: 105 MG/DL (ref 74–99)
HCT VFR BLD AUTO: 23.2 % (ref 41–52)
HGB BLD-MCNC: 7.9 G/DL (ref 13.5–17.5)
IMM GRANULOCYTES # BLD AUTO: 0 X10*3/UL (ref 0–0.7)
IMM GRANULOCYTES NFR BLD AUTO: 0 % (ref 0–0.9)
LYMPHOCYTES # BLD AUTO: 1.27 X10*3/UL (ref 1.2–4.8)
LYMPHOCYTES NFR BLD AUTO: 86.4 %
MCH RBC QN AUTO: 30.4 PG (ref 26–34)
MCHC RBC AUTO-ENTMCNC: 34.1 G/DL (ref 32–36)
MCV RBC AUTO: 89 FL (ref 80–100)
MONOCYTES # BLD AUTO: 0.06 X10*3/UL (ref 0.1–1)
MONOCYTES NFR BLD AUTO: 4.1 %
NEUTROPHILS # BLD AUTO: 0.14 X10*3/UL (ref 1.2–7.7)
NEUTROPHILS NFR BLD AUTO: 9.5 %
NRBC BLD-RTO: ABNORMAL /100{WBCS}
OVALOCYTES BLD QL SMEAR: NORMAL
PLATELET # BLD AUTO: 12 X10*3/UL (ref 150–450)
POLYCHROMASIA BLD QL SMEAR: NORMAL
POTASSIUM SERPL-SCNC: 3.9 MMOL/L (ref 3.5–5.3)
PROT SERPL-MCNC: 7.4 G/DL (ref 6.4–8.2)
RBC # BLD AUTO: 2.6 X10*6/UL (ref 4.5–5.9)
RBC MORPH BLD: NORMAL
RH FACTOR (ANTIGEN D): NORMAL
SODIUM SERPL-SCNC: 136 MMOL/L (ref 136–145)
WBC # BLD AUTO: 1.5 X10*3/UL (ref 4.4–11.3)

## 2025-02-10 PROCEDURE — 85025 COMPLETE CBC W/AUTO DIFF WBC: CPT

## 2025-02-10 PROCEDURE — 86901 BLOOD TYPING SEROLOGIC RH(D): CPT

## 2025-02-10 PROCEDURE — 86870 RBC ANTIBODY IDENTIFICATION: CPT | Mod: MUE

## 2025-02-10 PROCEDURE — 80053 COMPREHEN METABOLIC PANEL: CPT

## 2025-02-10 PROCEDURE — 86860 RBC ANTIBODY ELUTION: CPT

## 2025-02-10 PROCEDURE — 86922 COMPATIBILITY TEST ANTIGLOB: CPT

## 2025-02-10 PROCEDURE — 86900 BLOOD TYPING SEROLOGIC ABO: CPT

## 2025-02-10 PROCEDURE — 86978 RBC PRETREATMENT SERUM: CPT

## 2025-02-10 PROCEDURE — 36415 COLL VENOUS BLD VENIPUNCTURE: CPT

## 2025-02-10 PROCEDURE — 86880 COOMBS TEST DIRECT: CPT

## 2025-02-10 RX ORDER — ALBUTEROL SULFATE 0.83 MG/ML
3 SOLUTION RESPIRATORY (INHALATION) AS NEEDED
Status: CANCELLED | OUTPATIENT
Start: 2025-02-10

## 2025-02-10 RX ORDER — HEPARIN SODIUM,PORCINE/PF 10 UNIT/ML
50 SYRINGE (ML) INTRAVENOUS AS NEEDED
OUTPATIENT
Start: 2025-02-10

## 2025-02-10 RX ORDER — EPINEPHRINE 0.3 MG/.3ML
0.3 INJECTION SUBCUTANEOUS EVERY 5 MIN PRN
OUTPATIENT
Start: 2025-02-10

## 2025-02-10 RX ORDER — DIPHENHYDRAMINE HYDROCHLORIDE 50 MG/ML
50 INJECTION INTRAMUSCULAR; INTRAVENOUS AS NEEDED
Status: CANCELLED | OUTPATIENT
Start: 2025-02-10

## 2025-02-10 RX ORDER — FAMOTIDINE 10 MG/ML
20 INJECTION INTRAVENOUS ONCE AS NEEDED
Status: CANCELLED | OUTPATIENT
Start: 2025-02-10

## 2025-02-10 RX ORDER — FUROSEMIDE 10 MG/ML
20 INJECTION INTRAMUSCULAR; INTRAVENOUS ONCE
Status: CANCELLED | OUTPATIENT
Start: 2025-02-10 | End: 2025-02-10

## 2025-02-10 RX ORDER — EPINEPHRINE 0.3 MG/.3ML
0.3 INJECTION SUBCUTANEOUS EVERY 5 MIN PRN
Status: CANCELLED | OUTPATIENT
Start: 2025-02-10

## 2025-02-10 RX ORDER — HEPARIN 100 UNIT/ML
500 SYRINGE INTRAVENOUS AS NEEDED
OUTPATIENT
Start: 2025-02-10

## 2025-02-10 RX ORDER — DIPHENHYDRAMINE HYDROCHLORIDE 50 MG/ML
50 INJECTION INTRAMUSCULAR; INTRAVENOUS AS NEEDED
OUTPATIENT
Start: 2025-02-10

## 2025-02-10 RX ORDER — FUROSEMIDE 10 MG/ML
20 INJECTION INTRAMUSCULAR; INTRAVENOUS AS NEEDED
Status: CANCELLED | OUTPATIENT
Start: 2025-02-10

## 2025-02-10 RX ORDER — FUROSEMIDE 10 MG/ML
20 INJECTION INTRAMUSCULAR; INTRAVENOUS ONCE
OUTPATIENT
Start: 2025-02-10 | End: 2025-02-10

## 2025-02-10 RX ORDER — FAMOTIDINE 10 MG/ML
20 INJECTION INTRAVENOUS ONCE AS NEEDED
OUTPATIENT
Start: 2025-02-10

## 2025-02-10 RX ORDER — ALBUTEROL SULFATE 0.83 MG/ML
3 SOLUTION RESPIRATORY (INHALATION) AS NEEDED
OUTPATIENT
Start: 2025-02-10

## 2025-02-10 ASSESSMENT — PAIN SCALES - GENERAL: PAINLEVEL_OUTOF10: 7

## 2025-02-10 NOTE — PROGRESS NOTES
PUT 1 DROP INTO BOTH EYES EVERY NIGHT AT BEDTIME Spoke with patient and scheduled initial assessment for 3/3/2025 at 1 pm

## 2025-02-10 NOTE — PROGRESS NOTES
Outreached patient regarding Collaborative Care referral. Left voicemail requesting return call.

## 2025-02-11 ENCOUNTER — TELEPHONE (OUTPATIENT)
Dept: HEMATOLOGY/ONCOLOGY | Facility: CLINIC | Age: 65
End: 2025-02-11
Payer: MEDICARE

## 2025-02-11 NOTE — PROGRESS NOTES
MYLESW met with patient today as he had questions about how to obtain his medical records.  Patient has a second opinion scheduled with Dr. Beltrán from the [facility] tomorrow morning at 9am.  Patient was instructed to provide information on his bone marrow biopsy.  Patient signed a release of information and MYLESW faxed the results of his bone marrow biopsy, along with the most recent provider note from Dr. Beebe, to Dr. Beltrán office.  No other issues or concerns noted at this time.  LSW will remain available.

## 2025-02-12 ENCOUNTER — PATIENT OUTREACH (OUTPATIENT)
Dept: PRIMARY CARE | Facility: CLINIC | Age: 65
End: 2025-02-12
Payer: MEDICARE

## 2025-02-12 ENCOUNTER — TELEPHONE (OUTPATIENT)
Dept: HEMATOLOGY/ONCOLOGY | Facility: CLINIC | Age: 65
End: 2025-02-12
Payer: MEDICARE

## 2025-02-12 RX ORDER — EPINEPHRINE 0.3 MG/.3ML
0.3 INJECTION SUBCUTANEOUS EVERY 5 MIN PRN
OUTPATIENT
Start: 2025-02-12

## 2025-02-12 RX ORDER — DIPHENHYDRAMINE HYDROCHLORIDE 50 MG/ML
50 INJECTION INTRAMUSCULAR; INTRAVENOUS AS NEEDED
OUTPATIENT
Start: 2025-02-12

## 2025-02-12 RX ORDER — ALBUTEROL SULFATE 0.83 MG/ML
3 SOLUTION RESPIRATORY (INHALATION) AS NEEDED
OUTPATIENT
Start: 2025-02-12

## 2025-02-12 RX ORDER — FAMOTIDINE 10 MG/ML
20 INJECTION INTRAVENOUS ONCE AS NEEDED
OUTPATIENT
Start: 2025-02-12

## 2025-02-12 RX ORDER — FUROSEMIDE 10 MG/ML
20 INJECTION INTRAMUSCULAR; INTRAVENOUS ONCE
OUTPATIENT
Start: 2025-02-12 | End: 2025-02-12

## 2025-02-12 NOTE — TELEPHONE ENCOUNTER
Call from pt transferred to this RN.  Pt reports being seen at [facility] and being told he needs blood and plt.  We discussed the apt that is scheduled for tomorrow and anticipating his need for blood and possibly plt.  I reiterated with him the need to these twice weekly visits so current type and screens can be drawn.  I reminded him that he has antibodies that require a more involved blood bank work up.  I reassured him that we would be ready to transfuse him if needed.  He is considering a transfer of care to [facility].  He understands importance of ensuring there are no gaps in his count checks.  I told him we would work with him whatever he decided regarding his care.  He was aware of tomorrow's apt and will be here.

## 2025-02-13 ENCOUNTER — TELEPHONE (OUTPATIENT)
Dept: HEMATOLOGY/ONCOLOGY | Facility: CLINIC | Age: 65
End: 2025-02-13
Payer: MEDICARE

## 2025-02-13 ENCOUNTER — SOCIAL WORK (OUTPATIENT)
Dept: HEMATOLOGY/ONCOLOGY | Facility: CLINIC | Age: 65
End: 2025-02-13

## 2025-02-13 ENCOUNTER — INFUSION (OUTPATIENT)
Dept: HEMATOLOGY/ONCOLOGY | Facility: CLINIC | Age: 65
End: 2025-02-13
Payer: COMMERCIAL

## 2025-02-13 VITALS
DIASTOLIC BLOOD PRESSURE: 66 MMHG | OXYGEN SATURATION: 93 % | SYSTOLIC BLOOD PRESSURE: 133 MMHG | RESPIRATION RATE: 20 BRPM | HEART RATE: 93 BPM | WEIGHT: 300.6 LBS | BODY MASS INDEX: 48.52 KG/M2 | TEMPERATURE: 97.3 F

## 2025-02-13 DIAGNOSIS — F33.1 MODERATE EPISODE OF RECURRENT MAJOR DEPRESSIVE DISORDER: ICD-10-CM

## 2025-02-13 DIAGNOSIS — D75.81 MYELOFIBROSIS (MULTI): ICD-10-CM

## 2025-02-13 LAB
BASOPHILS # BLD AUTO: 0 X10*3/UL (ref 0–0.1)
BASOPHILS NFR BLD AUTO: 0 %
BB ANTIBODY IDENTIFICATION: NORMAL
BLOOD EXPIRATION DATE: NORMAL
BLOOD EXPIRATION DATE: NORMAL
CASE #: NORMAL
DISPENSE STATUS: NORMAL
DISPENSE STATUS: NORMAL
EOSINOPHIL # BLD AUTO: 0 X10*3/UL (ref 0–0.7)
EOSINOPHIL NFR BLD AUTO: 0 %
ERYTHROCYTE [DISTWIDTH] IN BLOOD BY AUTOMATED COUNT: 14.6 % (ref 11.5–14.5)
HCT VFR BLD AUTO: 20.7 % (ref 41–52)
HGB BLD-MCNC: 7.2 G/DL (ref 13.5–17.5)
IMM GRANULOCYTES # BLD AUTO: 0.03 X10*3/UL (ref 0–0.7)
IMM GRANULOCYTES NFR BLD AUTO: 2.1 % (ref 0–0.9)
LYMPHOCYTES # BLD AUTO: 1.18 X10*3/UL (ref 1.2–4.8)
LYMPHOCYTES NFR BLD AUTO: 84.3 %
MCH RBC QN AUTO: 30.4 PG (ref 26–34)
MCHC RBC AUTO-ENTMCNC: 34.8 G/DL (ref 32–36)
MCV RBC AUTO: 87 FL (ref 80–100)
MONOCYTES # BLD AUTO: 0.09 X10*3/UL (ref 0.1–1)
MONOCYTES NFR BLD AUTO: 6.4 %
NEUTROPHILS # BLD AUTO: 0.1 X10*3/UL (ref 1.2–7.7)
NEUTROPHILS NFR BLD AUTO: 7.2 %
NRBC BLD-RTO: ABNORMAL /100{WBCS}
OVALOCYTES BLD QL SMEAR: NORMAL
PLATELET # BLD AUTO: 12 X10*3/UL (ref 150–450)
PRODUCT BLOOD TYPE: 600
PRODUCT BLOOD TYPE: 6200
PRODUCT CODE: NORMAL
PRODUCT CODE: NORMAL
RBC # BLD AUTO: 2.37 X10*6/UL (ref 4.5–5.9)
RBC MORPH BLD: NORMAL
UNIT ABO: NORMAL
UNIT ABO: NORMAL
UNIT NUMBER: NORMAL
UNIT NUMBER: NORMAL
UNIT RH: NORMAL
UNIT RH: NORMAL
UNIT VOLUME: 181
UNIT VOLUME: 266
WBC # BLD AUTO: 1.4 X10*3/UL (ref 4.4–11.3)

## 2025-02-13 PROCEDURE — 36415 COLL VENOUS BLD VENIPUNCTURE: CPT

## 2025-02-13 PROCEDURE — 85025 COMPLETE CBC W/AUTO DIFF WBC: CPT

## 2025-02-13 ASSESSMENT — PAIN SCALES - GENERAL: PAINLEVEL_OUTOF10: 8

## 2025-02-13 NOTE — PROGRESS NOTES
LSW met with patient today to follow up on how his second opinion appointment went with Dr. Beltrán from [facility].  Patient stating that it was a very positive visit - he felt that there were other treatments that he could potentially offer the patient and patient has decided that he would like to transfer care directly to Dr. Beltrán.  Confirmed that patient has a lab appointment on Monday with [facility] and that we would move forward with cancelling all further appointments at [facility].  Patient confirmed this - he would like to keep his appointment with Dr. Beebe that is scheduled on 2/17/25 so that he can personally thank him for the care he has provided.  No other issues or concerns noted.  Patient understands that he will reach out to his [facility] team for further care needs going forward.

## 2025-02-13 NOTE — PROGRESS NOTES
Patient came in for last count check and did not meet any parameter for transfusion. Hgb was 7.2 and platelets are 12. Patient was not c/o of any increased respiratory demand and understood that he did not make parameters as he did when he was with another facility earlier this week. Patient understands, that if he finds himself in distress before Monday that he may need to go to ED unless we are able to accommodate at main campus infusion. Patient will now be continuing care with [facility] as of 2/17. Patient requested to cancel all appointments with [facility] at this time except his follow up appointment with Dr Beebe. He would like the follow appt to be converted to a phone call. Patient stated how appreciative he was of everything we have done for him and would like to personally speak to Dr Beebe.

## 2025-02-14 LAB
BLOOD EXPIRATION DATE: NORMAL
DISPENSE STATUS: NORMAL
PRODUCT BLOOD TYPE: 9500
PRODUCT CODE: NORMAL
UNIT ABO: NORMAL
UNIT NUMBER: NORMAL
UNIT RH: NORMAL
UNIT VOLUME: 275
XM INTEP: NORMAL

## 2025-02-17 ENCOUNTER — APPOINTMENT (OUTPATIENT)
Dept: HEMATOLOGY/ONCOLOGY | Facility: CLINIC | Age: 65
End: 2025-02-17
Payer: MEDICARE

## 2025-02-17 ENCOUNTER — TELEMEDICINE (OUTPATIENT)
Dept: HEMATOLOGY/ONCOLOGY | Facility: CLINIC | Age: 65
End: 2025-02-17
Payer: MEDICARE

## 2025-02-17 DIAGNOSIS — R91.1 RIGHT UPPER LOBE PULMONARY NODULE: ICD-10-CM

## 2025-02-17 DIAGNOSIS — D75.81 MYELOFIBROSIS (MULTI): Primary | ICD-10-CM

## 2025-02-17 DIAGNOSIS — F17.211 CIGARETTE NICOTINE DEPENDENCE IN REMISSION: ICD-10-CM

## 2025-02-17 DIAGNOSIS — J43.2 CENTRILOBULAR EMPHYSEMA (MULTI): ICD-10-CM

## 2025-02-17 DIAGNOSIS — I10 PRIMARY HYPERTENSION: ICD-10-CM

## 2025-02-17 ASSESSMENT — ENCOUNTER SYMPTOMS
CARDIOVASCULAR NEGATIVE: 1
SHORTNESS OF BREATH: 1
EYES NEGATIVE: 1
LIGHT-HEADEDNESS: 1
GASTROINTESTINAL NEGATIVE: 1
ENDOCRINE NEGATIVE: 1
FATIGUE: 1
MUSCULOSKELETAL NEGATIVE: 1
PSYCHIATRIC NEGATIVE: 1
HEMATOLOGIC/LYMPHATIC NEGATIVE: 1
DIZZINESS: 1

## 2025-02-17 NOTE — PROGRESS NOTES
Patient ID: Jaycee Harp is a 65 y.o. male.  Referring Physician: Raul Beebe MD  [id number] [facility] [address]  [place] [id number]  Primary Care Provider: Mandeep Tucker DO  Visit Type:  Follow Up     Verbal consent was requested and obtained from patient on this date for a telehealth visit.    Subjective    HPI I want to be treated at [facility]    Review of Systems   Constitutional:  Positive for fatigue.   HENT:  Negative.     Eyes: Negative.    Respiratory:  Positive for shortness of breath.    Cardiovascular: Negative.    Gastrointestinal: Negative.    Endocrine: Negative.    Genitourinary: Negative.     Musculoskeletal: Negative.    Skin: Negative.    Neurological:  Positive for dizziness and light-headedness.   Hematological: Negative.    Psychiatric/Behavioral: Negative.          Objective   BSA: There is no height or weight on file to calculate BSA.  There were no vitals taken for this visit.     has a past medical history of Contact with and (suspected) exposure to covid-19 (08/28/2020), Hematuria (10/12/2023), Personal history of other endocrine, nutritional and metabolic disease (01/13/2021), and Personal history of other specified conditions (08/28/2020).   has a past surgical history that includes Other surgical history (10/31/2019).  Family History   Problem Relation Name Age of Onset    Diabetes Mother      Other (varicose veins) Mother      Liver cancer Mother      Diabetes Father      Tuberculosis Father       Oncology History Overview Note   Prognosis by data available at the time of diagnosis:  - GIPSS = 4 (int-2, median os 4.6y)  - MIPSS = 11 (very high risk, median os 1.8y)  - DIPSS = 4 (int-2, median os   - DIPSS Plus = 4 (high risk)  - MTSS = 7 (5y os 22%)    Note:  no cytogenetic results (karyotype culture failure); could increase risk but not decrease it     Myelofibrosis (Multi)   11/28/2024 Initial Diagnosis    Myeloproliferative Neoplasm - favor Primary  Myelofibrosis  Diagnosis:     BONE MARROW CLOT WITH ASPIRATE AND CORE WITH TOUCH PREP, LEFT ILIAC CREST:    --HYPERCELLULAR BONE MARROW (95%) WITH ATYPICAL MEGAKARYOCYTES AND INCREASED FIBROSIS CONSISTENT WITH MYELOPROLIFERATIVE NEOPLASM, SEE NOTE.    NOTE: Sections show increased and morphologically atypical megakaryocytes frequently arranged in clusters in a slightly hypercellular bone marrow. Blasts are not increased by morphology or CD34 immunohistochemical stain. There is increaased reticulin fibrosis (MF grade 2). There is not definitive morphologic evidence of dysplasia. 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 The overall findings are most consistent with a myeloproliferative neoplasm, favor primary myelofibrosis. Chromosomal analysis and FISH studies will be attempted and results will be reported separately. Correlation with clinical findings is recommended  Molecular: DNMT3A p.R736H ([id number] c.2207G>A)  U2AF1 p.S34F ([id number] c.[id number]>T)  Karyotype/microarray: karyotype culture failure; microarray with Allelic imbalances (copy neutral loss of heterozygosity) were detected including 2p25.3p14(0-68,210,872).      12/2/2024 -  Chemotherapy    Treatment:   I. Momelotinib 200mg/day -  Month 1: started 12/2/2024 -   100mg          Jaycee Harp  reports that he quit smoking about 54 years ago. His smoking use included cigarettes. He started smoking about 4 months ago. He has been exposed to tobacco smoke. He has quit using smokeless tobacco.  He  reports that he does not currently use alcohol after a past usage of about 24.0 standard drinks of alcohol per week.  He  reports current drug use. Drug: Marijuana.    Physical Exam    WBC   Date/Time Value Ref Range Status   02/13/2025 12:14 PM 1.4 (L) 4.4 - 11.3 x10*3/uL Final   02/10/2025 10:09 AM 1.5 (L) 4.4 - 11.3 x10*3/uL Final   02/06/2025 03:13 PM 1.7  (L) 4.4 - 11.3 x10*3/uL Final     nRBC   Date Value Ref Range Status   02/13/2025   Final     Comment:     Not Measured   02/10/2025   Final     Comment:     Not Measured   02/06/2025   Final     Comment:     Not Measured     RBC   Date Value Ref Range Status   02/13/2025 2.37 (L) 4.50 - 5.90 x10*6/uL Final   02/10/2025 2.60 (L) 4.50 - 5.90 x10*6/uL Final   02/06/2025 2.06 (L) 4.50 - 5.90 x10*6/uL Final     POC Hemoglobin   Date Value Ref Range Status   11/22/2024 6.3 (A) 13.5 - 17.5 g/dL Final     Hemoglobin   Date Value Ref Range Status   02/13/2025 7.2 (L) 13.5 - 17.5 g/dL Final   02/10/2025 7.9 (L) 13.5 - 17.5 g/dL Final   02/06/2025 6.3 (LL) 13.5 - 17.5 g/dL Final     Hematocrit   Date Value Ref Range Status   02/13/2025 20.7 (L) 41.0 - 52.0 % Final   02/10/2025 23.2 (L) 41.0 - 52.0 % Final   02/06/2025 18.5 (L) 41.0 - 52.0 % Final     MCV   Date/Time Value Ref Range Status   02/13/2025 12:14 PM 87 80 - 100 fL Final   02/10/2025 10:09 AM 89 80 - 100 fL Final   02/06/2025 03:13 PM 90 80 - 100 fL Final     MCH   Date/Time Value Ref Range Status   02/13/2025 12:14 PM 30.4 26.0 - 34.0 pg Final   02/10/2025 10:09 AM 30.4 26.0 - 34.0 pg Final   02/06/2025 03:13 PM 30.6 26.0 - 34.0 pg Final     MCHC   Date/Time Value Ref Range Status   02/13/2025 12:14 PM 34.8 32.0 - 36.0 g/dL Final   02/10/2025 10:09 AM 34.1 32.0 - 36.0 g/dL Final   02/06/2025 03:13 PM 34.1 32.0 - 36.0 g/dL Final     RDW   Date/Time Value Ref Range Status   02/13/2025 12:14 PM 14.6 (H) 11.5 - 14.5 % Final   02/10/2025 10:09 AM 15.3 (H) 11.5 - 14.5 % Final   02/06/2025 03:13 PM 17.0 (H) 11.5 - 14.5 % Final     Platelets   Date/Time Value Ref Range Status   02/13/2025 12:14 PM 12 (LL) 150 - 450 x10*3/uL Final   02/10/2025 10:09 AM 12 (LL) 150 - 450 x10*3/uL Final     Comment:     Platelet count verified by smear review.   02/06/2025 03:13 PM 15 (LL) 150 - 450 x10*3/uL Final     Comment:     Previous result verified on 2/5/2025 1843 on specimen/case  "[id number] called with component PLT for procedure CBC with value 26 x10*3/uL.     No results found for: \"MPV\"  Neutrophils %   Date/Time Value Ref Range Status   02/13/2025 12:14 PM 7.2 40.0 - 80.0 % Final   02/10/2025 10:09 AM 9.5 40.0 - 80.0 % Final   02/06/2025 03:13 PM 6.0 40.0 - 80.0 % Final     Immature Granulocytes %, Automated   Date/Time Value Ref Range Status   02/13/2025 12:14 PM 2.1 (H) 0.0 - 0.9 % Final     Comment:     Immature Granulocyte Count (IG) includes promyelocytes, myelocytes and metamyelocytes but does not include bands. Percent differential counts (%) should be interpreted in the context of the absolute cell counts (cells/UL).   02/10/2025 10:09 AM 0.0 0.0 - 0.9 % Final     Comment:     Immature Granulocyte Count (IG) includes promyelocytes, myelocytes and metamyelocytes but does not include bands. Percent differential counts (%) should be interpreted in the context of the absolute cell counts (cells/UL).   02/06/2025 03:13 PM 0.0 0.0 - 0.9 % Final     Comment:     Immature Granulocyte Count (IG) includes promyelocytes, myelocytes and metamyelocytes but does not include bands. Percent differential counts (%) should be interpreted in the context of the absolute cell counts (cells/UL).     Lymphocytes %, Manual   Date/Time Value Ref Range Status   11/14/2024 02:58 PM 76.0 13.0 - 44.0 % Final   10/11/2024 09:59 AM 70.0 13.0 - 44.0 % Final     Lymphocytes %   Date/Time Value Ref Range Status   02/13/2025 12:14 PM 84.3 13.0 - 44.0 % Final   02/10/2025 10:09 AM 86.4 13.0 - 44.0 % Final   02/06/2025 03:13 PM 89.2 13.0 - 44.0 % Final     Monocytes %, Manual   Date/Time Value Ref Range Status   11/14/2024 02:58 PM 5.0 2.0 - 10.0 % Final   10/11/2024 09:59 AM 3.0 2.0 - 10.0 % Final     Monocytes %   Date/Time Value Ref Range Status   02/13/2025 12:14 PM 6.4 2.0 - 10.0 % Final   02/10/2025 10:09 AM 4.1 2.0 - 10.0 % Final   02/06/2025 03:13 PM 4.8 2.0 - 10.0 % Final     Eosinophils %, Manual "   Date/Time Value Ref Range Status   11/14/2024 02:58 PM 2.0 0.0 - 6.0 % Final   10/11/2024 09:59 AM 2.0 0.0 - 6.0 % Final     Eosinophils %   Date/Time Value Ref Range Status   02/13/2025 12:14 PM 0.0 0.0 - 6.0 % Final   02/10/2025 10:09 AM 0.0 0.0 - 6.0 % Final   02/06/2025 03:13 PM 0.0 0.0 - 6.0 % Final     Basophils %, Manual   Date/Time Value Ref Range Status   11/14/2024 02:58 PM 0.0 0.0 - 2.0 % Final   10/11/2024 09:59 AM 0.0 0.0 - 2.0 % Final     Basophils %   Date/Time Value Ref Range Status   02/13/2025 12:14 PM 0.0 0.0 - 2.0 % Final   02/10/2025 10:09 AM 0.0 0.0 - 2.0 % Final   02/06/2025 03:13 PM 0.0 0.0 - 2.0 % Final     Neutrophils Absolute   Date/Time Value Ref Range Status   02/13/2025 12:14 PM 0.10 (L) 1.20 - 7.70 x10*3/uL Final     Comment:     Percent differential counts (%) should be interpreted in the context of the absolute cell counts (cells/uL).   02/10/2025 10:09 AM 0.14 (L) 1.20 - 7.70 x10*3/uL Final     Comment:     Percent differential counts (%) should be interpreted in the context of the absolute cell counts (cells/uL).   02/06/2025 03:13 PM 0.10 (L) 1.20 - 7.70 x10*3/uL Final     Comment:     Percent differential counts (%) should be interpreted in the context of the absolute cell counts (cells/uL).     Immature Granulocytes Absolute, Automated   Date/Time Value Ref Range Status   02/13/2025 12:14 PM 0.03 0.00 - 0.70 x10*3/uL Final   02/10/2025 10:09 AM 0.00 0.00 - 0.70 x10*3/uL Final   02/06/2025 03:13 PM 0.00 0.00 - 0.70 x10*3/uL Final     Lymphocytes Absolute   Date/Time Value Ref Range Status   02/13/2025 12:14 PM 1.18 (L) 1.20 - 4.80 x10*3/uL Final   02/10/2025 10:09 AM 1.27 1.20 - 4.80 x10*3/uL Final   02/06/2025 03:13 PM 1.48 1.20 - 4.80 x10*3/uL Final     Monocytes Absolute   Date/Time Value Ref Range Status   02/13/2025 12:14 PM 0.09 (L) 0.10 - 1.00 x10*3/uL Final   02/10/2025 10:09 AM 0.06 (L) 0.10 - 1.00 x10*3/uL Final   02/06/2025 03:13 PM 0.08 (L) 0.10 - 1.00 x10*3/uL  "Final     Eosinophils Absolute   Date/Time Value Ref Range Status   02/13/2025 12:14 PM 0.00 0.00 - 0.70 x10*3/uL Final   02/10/2025 10:09 AM 0.00 0.00 - 0.70 x10*3/uL Final   02/06/2025 03:13 PM 0.00 0.00 - 0.70 x10*3/uL Final     Eosinophils Absolute, Manual   Date/Time Value Ref Range Status   11/14/2024 02:58 PM 0.04 0.00 - 0.70 x10*3/uL Final   10/11/2024 09:59 AM 0.04 0.00 - 0.70 x10*3/uL Final     Basophils Absolute   Date/Time Value Ref Range Status   02/13/2025 12:14 PM 0.00 0.00 - 0.10 x10*3/uL Final     Comment:     Automated WBC differential has been confirmed by manual smear.   02/10/2025 10:09 AM 0.00 0.00 - 0.10 x10*3/uL Final   02/06/2025 03:13 PM 0.00 0.00 - 0.10 x10*3/uL Final     Basophils Absolute, Manual   Date/Time Value Ref Range Status   11/14/2024 02:58 PM 0.00 0.00 - 0.10 x10*3/uL Final   10/11/2024 09:59 AM 0.00 0.00 - 0.10 x10*3/uL Final       No components found for: \"PT\"  aPTT   Date/Time Value Ref Range Status   11/15/2024 12:05 AM 25 (L) 27 - 38 seconds Final   09/15/2021 04:30 PM 35 25 - 35 sec Final     Comment:       THE APTT IS NO LONGER USED FOR MONITORING     UNFRACTIONATED HEPARIN THERAPY.    FOR MONITORING HEPARIN THERAPY,     USE THE HEPARIN ASSAY.       Medication Documentation Review Audit       Reviewed by Cheryle Sylvester (Technician) on 02/13/25 at 1159      Medication Order Taking? Sig Documenting Provider Last Dose Status   acyclovir (Zovirax) 400 mg tablet [id number] Yes Take 1 tablet (400 mg) by mouth 2 times a day. Maria E Kramer APRN-CNP  Active   albuterol (ProAir HFA) 90 mcg/actuation inhaler [id number] Yes Inhale 2 puffs every 4 hours if needed for wheezing or shortness of breath. Mandeep Tucker, DO 11/14/2024 Active   budesonide-glycopyr-formoterol (Breztri Aerosphere) 160-9-4.8 mcg/actuation HFA aerosol inhaler [id number] Yes Inhale 2 puffs 2 times a day. Rinse mouth after. Mandeep Tucker, DO  Active   hydrOXYzine pamoate (VistariL) 25 mg capsule " [id number]  Take 1 capsule (25 mg) by mouth every 6 hours if needed for itching or anxiety for up to 10 days. Mandeep Tucker DO   24 235   losartan-hydrochlorothiazide (Hyzaar) 50-12.5 mg tablet [id number] Yes Take 1 tablet by mouth once daily. Mandeep Tucker DO  Active   momelotinib (Ojjaara) 100 mg tablet tablet [id number]  Take 1 tablet (100 mg) by mouth once daily.   Patient not taking: Reported on 2025    Raul Beebe MD  Active   omeprazole (PriLOSEC) 20 mg DR capsule [id number] Yes TAKE 1 CAPSULE (20 MG) BY MOUTH 2 TIMES A DAY. DO NOT CRUSH OR CHEW.   Patient taking differently: Take 1 capsule (20 mg) by mouth once daily. Do not crush or chew.    Mandeep Tucker DO  Active   POTASSIUM CHLORIDE ORAL [id number] Yes Take by mouth. Historical Provider, MD  Active   tadalafil (Cialis) 20 mg tablet [id number]  Take 1 tablet (20 mg) by mouth once daily as needed for erectile dysfunction. Shea Patel, APRN-CNP   24 235   varenicline (Chantix) 0.5 mg tablet [id number] Yes Take 1 tablet (0.5 mg) by mouth 2 times a day. Take with full glass of water. Historical Provider, MD 2024 Active                   Assessment/Plan    1) pancytopenia  -I reviewed his lab history in the EMR  -2021 wbc 7.2, hgb 16.2, plt 313,000  -9/15/2021 wbc 8.6, hgb 14.4, plt 242,000  -2023 wbc 11.2, hgb 13.9, plt 248,000  -10/11/2024 wbc 1.8, hgb 9.3, , plt 37,000, , abs lymph 1260 (smear: mild polychromasia, few ovalocytes, few teardrops, basophilic stippling)  -he is fatigued because of the anemia  -he was incarcerated for a number of years, and ended up with fungal infections in his toenails, which probably have nothing to do with his current pancytopenia, which is a fairly new development  -he may have MDS, or hairy cell leukemia, or even AML  -I have recommended proceeding with a bone marrow biopsy as soon as possible  -bmbx done on 2024  reviewed--hypercellular  bone marrow (95%) with atypical megakaryocytes and increased fibrosis consistent with myeloproliferative neoplasm; blasts are not increased by morphology or CD34 IHC; there is increased reticulin fibrosis (MF grade 2); molecular studies showed disease associated mutations in DNMT3A and U2AF1 however, no pathogenic mutations in JAK2, MPL or CALR. 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  -case was reviewed in malignant heme TB on 11/14/2024--panel consensus was to start with momelotinib and then proceed with transplant evaluation  -he was admitted to [place] a few days ago--was transfused PRBC  -today we will recheck CBC and then set him up for weekly lab checks--will transfuse if hgb <=7, and platelets <10K  -he saw Dr Massey on 12/5/2024--he advised to continue with momelotinib, weekly lab checks and to begin evaluation for transplant (he will see Dr Vargas in January)  -he is awaiting appointment with our psych NP Osvaldo Roper  -he continues to take momelotinib 100 mg PO daily  -11/8/2024  CBC shows wbc 1.8, hgb 8.1, plt 14,000,   -11/25/2024 wbc 1.8, hgb 7.6, plt 15,000,   -12/2/2024 wbc 1.4, hgb 6.6, plt 19,000   -12/5/2024 wbc 2.1, hgb 6.8, plt 18,000,   -12/9/2024 wbc 1.9, hgb 6.8, plt 13,000,   -12/16/2024 wbc 1.6, hgb 6.8, plt 15,000, , creatinine 1.11, alk phos 102, AST 43, ALT 84  -will consider antimicrobial prophylaxis if ANC drops to <200  -will continue with transfusion support PRN  -he will see Dr Massey again on 1/2/2025  -he will see Dr Vargas on 1/10/2025     1/15/2025 ** Jaycee has been diagnosed with myelofibrosis undergoing treatment and utilizes home oxygen. He is experiencing weakness, fatigue, and shortness of breath. He would benefit from the use of a wheelchair in the home. A cane or a walker would not provide enough support and would be unsafe for him at this time. He would benefit from the use of a wheelchair to increase his  "activity in the home. He will be able to use this safely with the assistance of his family.     -here for interval followup  -because of his cohort of medical problems as well as social issues (including the fact that his [employer] insurance plan discontinued his prescription plan coverage without informing him, so that he now has a new insurance plan that will become effective on February 1), he has decided against consideration for bone marrow transplant  -he continues to take momelotinib 100 mg daily  -also taking acyclovir 400 mg PO BID  -remains transfusion dependent, and last week, he started with platelet transfusion  -he was unable to come last week as his car wouldn't start  -his significant other's niece supposedly has myelofibrosis too, 15 years ago was placed on clinical trial and to this day is still taking the \"experimental drug that starts with a number\" and doing well, so that he wants to go see Dr Balwinder Beltrán for 2nd opinion  -today we checked CBC + COMP  -results reviewed--wbc 1.4, hgb 7.4, plt 11,000, , abs lymph 1140, creatinine 0.99, calcium 9.1, alk phos 76, AST 52,   -as hgb >7.0 and plt >10K, he does not need transfusion support this week  -will continue to take momelotinib 100 mg daily  -will continue to watch LFTs as well  -will see him again in a few weeks  -here for interval followup via telephone  -labs done on 2/13/2025- wbc 1.4, hgb 7.2, plt 12,000  -Jaycee is opting to be treated at [facility] right now  -has been taking momelotinib for nearly 3 months without any improvement with his counts  -Dr Beltrán thinks he has MPN/MDS and will try LEIF, (however, pt's native EPO level is well over 500) and/or pacritinib     2) COPD/emphysema  -on proair  -he is not actually dependent on oxygen--he only started using oxygen PRN when he became anemic     3) hypertension  -on losartan-hydrochlorothiazide     4) high BMI  -on phentermine  -on wegovy     5) tobacco abuse  -on chantix     6) lung " nodule  -LDCT done on 10/3/2024 showed a 1.7 cm pleural based nodule in the RUL   -PET scan done on 10/7/2024 showed FDG avid pleural based right upper lobe nodule with SUV 4.9; there is mild hypermetabolic activity along the periphery paraseptal emphysematous changes within left lower lobe with SUV 2.9; no FDG avid mediastinal, hilar or axillary lymphadenopathy  -he has seen thoracic surgeon Dr Garcia, who recommended either resection vs SBRT  -this lesion looks atypical for malignancy--could be inflammatory  -because of his marked thrombocytopenia--biopsy cannot be done right now          Problem List Items Addressed This Visit             ICD-10-CM    Myelofibrosis (Multi) D75.81            Raul Beebe MD

## 2025-02-19 ENCOUNTER — PATIENT OUTREACH (OUTPATIENT)
Dept: PRIMARY CARE | Facility: CLINIC | Age: 65
End: 2025-02-19
Payer: COMMERCIAL

## 2025-02-19 DIAGNOSIS — K21.9 GASTROESOPHAGEAL REFLUX DISEASE WITHOUT ESOPHAGITIS: ICD-10-CM

## 2025-02-19 DIAGNOSIS — I10 PRIMARY HYPERTENSION: ICD-10-CM

## 2025-02-19 DIAGNOSIS — D46.Z MYELODYSPLASIA PRESENT IN BONE MARROW (MULTI): ICD-10-CM

## 2025-02-19 DIAGNOSIS — F31.5 BIPOLAR DISORDER, CURRENT EPISODE DEPRESSED, SEVERE, WITH PSYCHOTIC FEATURES (MULTI): ICD-10-CM

## 2025-02-19 DIAGNOSIS — C34.90 MALIGNANT NEOPLASM OF UNSPECIFIED PART OF UNSPECIFIED BRONCHUS OR LUNG (MULTI): ICD-10-CM

## 2025-02-19 RX ORDER — OMEPRAZOLE 20 MG/1
20 CAPSULE, DELAYED RELEASE ORAL 2 TIMES DAILY
Qty: 90 CAPSULE | Refills: 0 | Status: SHIPPED | OUTPATIENT
Start: 2025-02-19

## 2025-02-19 NOTE — PROGRESS NOTES
Introduced patient to Chronic Care Management Program.   Explained that in my role as Care Manager, I will:  - Be a point of contact with questions and concerns  - Focus on chronic conditions and achieving health goals  - Make sure patient is receiving all preventative care he/she is due for    Verbal consent given to enroll.  My contact info was provided for any needs that may arise.  Pt is aware that I will call to check in the next 4 weeks.  Patient's main concerns at this time are:    Myelofibrosis: Pt was initially seeing Dr Beebe but has decided he'd like to proceed under the care of Dr. Beltrán at [facility]. Pt says he is educating himself on his disease process. Pt's s/o has family with the same disease, being treated with a trial drug and has lived over 10 years under the care of [facility]. Pt does not fear death but is not ready to die. Pt says he has a lot he wants to see and experience with his family first. Pt currently having frequent blood transfusions and feels well immediately following. Pt is wearing O2 continuously as he becomes easily SOB with exertion. Pt does use a walker and wheelchair for assistance.    HTN: Pt says he feels his BP is good. Pt does not take it at home but has frequent office visits and has been unremarkable. Pt is taking losartan-hydrochlorothiazide as ordered. Noted BP at 02/18/25 infusion was 115/60. Pt denies concerns about his blood pressure.    DM II: Pt does not take any medication for diabetes at this time. Monitoring    Lab Results   Component Value Date    HGBA1C 6.4 09/13/2024     Bipolar disorder, depression: In addition to his new diagnosis, pt is also dealing with the death of his son. Pt has lost two of his three sons. Much time spent allowing pt to talk of his family and life. Pt appears to be coping fair but will benefit from ongoing emotional support.     02/27/25 Urology with Dr. Angulo  03/03/25 STERLING Jordan  03/04/25 Heme Onc with CARLOS A Mcfarlane  03/05/25  Urology with Dr. Landis  03/11/25 Pulmonology with Dr. Hinojosa    Pt wishes to continue with CCM at this time. This RN CM ensured pt has direct contact number and encouraged to call with any questions or concerns.

## 2025-02-20 ENCOUNTER — APPOINTMENT (OUTPATIENT)
Dept: HEMATOLOGY/ONCOLOGY | Facility: CLINIC | Age: 65
End: 2025-02-20
Payer: MEDICARE

## 2025-02-22 LAB
ATRIAL RATE: 76 BPM
P AXIS: 59 DEGREES
P OFFSET: 199 MS
P ONSET: 148 MS
PR INTERVAL: 154 MS
Q ONSET: 225 MS
QRS COUNT: 12 BEATS
QRS DURATION: 88 MS
QT INTERVAL: 368 MS
QTC CALCULATION(BAZETT): 414 MS
QTC FREDERICIA: 398 MS
R AXIS: 59 DEGREES
T AXIS: 66 DEGREES
T OFFSET: 409 MS
VENTRICULAR RATE: 76 BPM

## 2025-02-24 ENCOUNTER — APPOINTMENT (OUTPATIENT)
Dept: HEMATOLOGY/ONCOLOGY | Facility: CLINIC | Age: 65
End: 2025-02-24
Payer: MEDICARE

## 2025-02-27 ENCOUNTER — APPOINTMENT (OUTPATIENT)
Dept: HEMATOLOGY/ONCOLOGY | Facility: CLINIC | Age: 65
End: 2025-02-27
Payer: COMMERCIAL

## 2025-03-03 ENCOUNTER — APPOINTMENT (OUTPATIENT)
Dept: HEMATOLOGY/ONCOLOGY | Facility: CLINIC | Age: 65
End: 2025-03-03
Payer: MEDICARE

## 2025-03-03 ENCOUNTER — APPOINTMENT (OUTPATIENT)
Dept: PRIMARY CARE | Facility: CLINIC | Age: 65
End: 2025-03-03
Payer: MEDICARE

## 2025-03-03 ENCOUNTER — PATIENT OUTREACH (OUTPATIENT)
Dept: PRIMARY CARE | Facility: CLINIC | Age: 65
End: 2025-03-03

## 2025-03-03 DIAGNOSIS — J43.2 CENTRILOBULAR EMPHYSEMA (MULTI): ICD-10-CM

## 2025-03-03 RX ORDER — BUDESONIDE, GLYCOPYRROLATE, AND FORMOTEROL FUMARATE 160; 9; 4.8 UG/1; UG/1; UG/1
2 AEROSOL, METERED RESPIRATORY (INHALATION)
Qty: 10.7 G | Refills: 2 | Status: SHIPPED | OUTPATIENT
Start: 2025-03-03

## 2025-03-03 RX ORDER — ALBUTEROL SULFATE 90 UG/1
2 INHALANT RESPIRATORY (INHALATION) EVERY 4 HOURS PRN
Qty: 8.5 G | Refills: 1 | Status: SHIPPED | OUTPATIENT
Start: 2025-03-03 | End: 2026-03-03

## 2025-03-03 NOTE — PROGRESS NOTES
Discharge facility: [facility]  Discharge diagnosis:  Febrile neutropenia, anemia and thrombocytopenia  Admission date: 02/27/2025  Discharge date: 03/01/2025    PCP Appointment Date: TBD  Specialist Appointment Date: 03/04/2025 with Hem Onc [facility]  Hospital Encounter and Summary: Linked     Hospital Encounter with Carmelo Klein MD; Duane Mack MD; eJwel Jin MD; Martín Hanley MD       TCM Outreach completed: 03/03/2025  Discharge date: 03/01/2025  2 attempts were made to reach patient to assess needs. No return call as of this note. If patient schedules follow-up visit within 14 days of discharge, visit is TCM billable. Message sent to PRACTICE STAFF to reach out to patient and schedule an appointment within 7-13 days from discharge date.    If patient meets criteria for moderately complex & has follow-up within 14 days - can bill [phone] for hospital follow-up.  If patient meets criteria for high complex & has follow-up visit within 7 days - can bill [phone] for hospital follow-up

## 2025-03-03 NOTE — TELEPHONE ENCOUNTER
Pt needs refills on  budesonide-glycopyr-formoterol (Breztri Aerosphere) 160-9-4.8 mcg/actuation HFA aerosol inhaler   albuterol (ProAir HFA) 90 mcg/actuation inhaler   [id number] - [place], [place] - [address]

## 2025-03-04 ENCOUNTER — TELEPHONE (OUTPATIENT)
Dept: PRIMARY CARE | Facility: CLINIC | Age: 65
End: 2025-03-04
Payer: COMMERCIAL

## 2025-03-04 NOTE — PROGRESS NOTES
Outreached patient due to missed appointment yesterday (initial assessment). Left voicemail requesting return call.

## 2025-03-05 ENCOUNTER — TELEPHONE (OUTPATIENT)
Dept: PRIMARY CARE | Facility: CLINIC | Age: 65
End: 2025-03-05
Payer: COMMERCIAL

## 2025-03-05 NOTE — PROGRESS NOTES
Outreached pt due to missed appt yesterday. Yesterday, pt contacted office asking if [facility] could call back later  because he was at an appointment. Left voicemail requesting a return call at [phone] at if pt is interested in re-scheduling.

## 2025-03-06 ENCOUNTER — APPOINTMENT (OUTPATIENT)
Dept: HEMATOLOGY/ONCOLOGY | Facility: CLINIC | Age: 65
End: 2025-03-06
Payer: COMMERCIAL

## 2025-03-13 ENCOUNTER — APPOINTMENT (OUTPATIENT)
Dept: HEMATOLOGY/ONCOLOGY | Facility: CLINIC | Age: 65
End: 2025-03-13
Payer: MEDICARE

## 2025-03-14 DIAGNOSIS — K21.9 GASTROESOPHAGEAL REFLUX DISEASE WITHOUT ESOPHAGITIS: ICD-10-CM

## 2025-03-14 RX ORDER — OMEPRAZOLE 20 MG/1
20 CAPSULE, DELAYED RELEASE ORAL 2 TIMES DAILY
Qty: 180 CAPSULE | Refills: 1 | Status: SHIPPED | OUTPATIENT
Start: 2025-03-14

## 2025-03-17 ENCOUNTER — PATIENT OUTREACH (OUTPATIENT)
Dept: PRIMARY CARE | Facility: CLINIC | Age: 65
End: 2025-03-17
Payer: MEDICARE

## 2025-03-17 DIAGNOSIS — F31.5 BIPOLAR DISORDER, CURRENT EPISODE DEPRESSED, SEVERE, WITH PSYCHOTIC FEATURES (MULTI): ICD-10-CM

## 2025-03-17 DIAGNOSIS — D69.6 THROMBOCYTOPENIA (CMS-HCC): ICD-10-CM

## 2025-03-17 DIAGNOSIS — D64.9 ANEMIA, UNSPECIFIED TYPE: ICD-10-CM

## 2025-03-17 DIAGNOSIS — D46.Z MYELODYSPLASIA PRESENT IN BONE MARROW (MULTI): ICD-10-CM

## 2025-03-17 NOTE — PROGRESS NOTES
Discharge facility: [facility]  Discharge diagnosis: Anemia & Thrombocytopenia  Admission date: 03/13/2025  Discharge date: 03/16/2025    PCP Appointment Date: 03/19/2025 @ 1330 (Virtual)  Specialist Appointment Date: 03/25/2025 [facility] Hematology/Oncology  Hospital Encounter and Summary: Linked     Hospital Encounter with JAYLIN FABIAN UMESH      See Discharge assessment below for further details     Wrap Up  Is the patient/caregiver familiar with Advance Care Planning?: Yes (3/17/2025  9:30 AM)  Would the patient like more information on Advance Care Planning?: No (3/17/2025  9:30 AM)  Call End Time: 0933 (3/17/2025  9:30 AM)    Engagement  Call Start Time: 0918 (3/17/2025  9:30 AM)    Medications  Medications reviewed with patient/caregiver?: Yes (3/17/2025  9:30 AM)  Is the patient having any side effects they believe may be caused by any medication additions or changes?: No (3/17/2025  9:30 AM)  Does the patient have all medications ordered at discharge?: No (Pt intends to purchase polyethylene glycol OTC) (3/17/2025  9:30 AM)  What is keeping the patient from filling the prescriptions?: -- (No barriers) (3/17/2025  9:30 AM)  Care Management Interventions: Provided patient education (3/17/2025  9:30 AM)  Prescription Comments: Educated pt on the importance of bowel maintenance. Discussed bowel texture and avoidance of constipation (3/17/2025  9:30 AM)  Is the patient taking all medications as directed (includes completed medication regime)?: Yes (3/17/2025  9:30 AM)    Appointments  Does the patient have a primary care provider?: Yes (3/17/2025  9:30 AM)  Care Management Interventions: Verified appointment date/time/provider ([facility] hospital follow up scheduled for 03/19/2025 @ 1330) (3/17/2025  9:30 AM)  Has the patient kept scheduled appointments due by today?: Yes (3/17/2025  9:30 AM)  Care Management Interventions: Advised patient to keep appointment; Educated on importance of keeping appointment  (3/17/2025  9:30 AM)    Self Management  What is the home health agency?: N/A (3/17/2025  9:30 AM)  Has home health visited the patient within 72 hours of discharge?: Not applicable (3/17/2025  9:30 AM)  What Durable Medical Equipment (DME) was ordered?: N/A (3/17/2025  9:30 AM)    Patient Teaching  Does the patient have access to their discharge instructions?: Yes (3/17/2025  9:30 AM)  What is the patient's perception of their health status since discharge?: Improving (3/17/2025  9:30 AM)  Is the patient/caregiver able to teach back the hierarchy of who to call/visit for symptoms/problems? PCP, Specialist, Home Health nurse, Urgent Care, ED, 911: Yes (3/17/2025  9:30 AM)

## 2025-03-19 ENCOUNTER — APPOINTMENT (OUTPATIENT)
Dept: PRIMARY CARE | Facility: CLINIC | Age: 65
End: 2025-03-19
Payer: MEDICARE

## 2025-03-19 ENCOUNTER — SPECIALTY PHARMACY (OUTPATIENT)
Dept: PHARMACY | Facility: CLINIC | Age: 65
End: 2025-03-19

## 2025-03-24 ENCOUNTER — PATIENT OUTREACH (OUTPATIENT)
Dept: PRIMARY CARE | Facility: CLINIC | Age: 65
End: 2025-03-24
Payer: MEDICARE

## 2025-03-24 DIAGNOSIS — D64.9 SYMPTOMATIC ANEMIA: ICD-10-CM

## 2025-03-25 ENCOUNTER — PATIENT OUTREACH (OUTPATIENT)
Dept: PRIMARY CARE | Facility: CLINIC | Age: 65
End: 2025-03-25
Payer: MEDICARE

## 2025-03-25 DIAGNOSIS — D64.9 SYMPTOMATIC ANEMIA: ICD-10-CM

## 2025-03-25 DIAGNOSIS — I10 HYPERTENSION, UNSPECIFIED TYPE: ICD-10-CM

## 2025-03-25 RX ORDER — NEBULIZER AND COMPRESSOR
1 EACH MISCELLANEOUS ONCE
Qty: 1 EACH | Refills: 0 | Status: CANCELLED | OUTPATIENT
Start: 2025-03-25 | End: 2025-03-25

## 2025-03-25 NOTE — PROGRESS NOTES
Discharge facility: [facility]  Discharge diagnosis: Symptomatic anemia  Admission date: 03/18/2025  Discharge date: 03/22/2025    PCP Appointment Date: *TBD* Needs to be scheduled on or before 04/04/25 to qualify for TCM  Specialist Appointment Date: Dr. Beltrán w/Hematology Oncology  Hospital Encounter and Summary: Linked     Hospital Encounter with Maxime Lindsay MD; Juyl White MD; Ricky Norton MD, PhD      See Discharge assessment below for further details      Wrap Up  Is the patient/caregiver familiar with Advance Care Planning?: Yes (3/25/2025  9:43 AM)  Would the patient like more information on Advance Care Planning?: No (3/25/2025  9:43 AM)  Wrap Up Additional Comments: This RN CM to follow up with patient about TCM appointment and BP cuff (3/25/2025  9:43 AM)  Call End Time: 1005 (3/25/2025  9:43 AM)    Engagement  Call Start Time: 0939 (3/25/2025  9:43 AM)    Medications  Medications reviewed with patient/caregiver?: Yes (3/25/2025  9:43 AM)  Is the patient having any side effects they believe may be caused by any medication additions or changes?: (!) Yes (Pt's BP was getting too low on the Hyzaar. Pt is not taking. He does not have BP cuff at home to follow parameters to hold. He would like a diuretic without an antihypertensive) (3/25/2025  9:43 AM)  Does the patient have all medications ordered at discharge?: Yes (3/25/2025  9:43 AM)  Care Management Interventions: Provided patient education (3/25/2025  9:43 AM)  Prescription Comments: This RN CM to assist pt with obtaining BP cuff through insurance. Pt also has a flex spending card through [facility] Medicare that can be used for BP cuff. (3/25/2025  9:43 AM)  Is the patient taking all medications as directed (includes completed medication regime)?: No (3/25/2025  9:43 AM)  What is preventing the patient from taking all medications as directed?: Side effects (3/25/2025  9:43 AM)  Care Management Interventions: Provided patient education;  Notified provider (3/25/2025  9:43 AM)  Medication Comments: Will route communication to Dr. Tucker and his clinical staff (3/25/2025  9:43 AM)    Appointments  Does the patient have a primary care provider?: Yes (3/25/2025  9:43 AM)  Care Management Interventions: Educated patient on importance of making appointment (Request sent to office for hospital follow up visit in office on 4/2/25 per pt request.) (3/25/2025  9:43 AM)  Has the patient kept scheduled appointments due by today?: No (3/25/2025  9:43 AM)  What is preventing the patient from keeping their appointments?: -- (Pt was hospitalized) (3/25/2025  9:43 AM)  Care Management Interventions: Educated on importance of keeping appointment (3/25/2025  9:43 AM)  Follow Up Tasks: Appointments (3/25/2025  9:43 AM)    Self Management  What is the home health agency?: N/A (3/25/2025  9:43 AM)  Has home health visited the patient within 72 hours of discharge?: Not applicable (3/25/2025  9:43 AM)  What Durable Medical Equipment (DME) was ordered?: N/A (3/25/2025  9:43 AM)    Patient Teaching  Does the patient have access to their discharge instructions?: Yes (3/25/2025  9:43 AM)  Care Management Interventions: Reviewed instructions with patient (3/25/2025  9:43 AM)  What is the patient's perception of their health status since discharge?: Same (3/25/2025  9:43 AM)  Is the patient/caregiver able to teach back the hierarchy of who to call/visit for symptoms/problems? PCP, Specialist, Home Health nurse, Urgent Care, ED, 911: Yes (3/25/2025  9:43 AM)

## 2025-03-26 ENCOUNTER — TELEPHONE (OUTPATIENT)
Dept: PRIMARY CARE | Facility: CLINIC | Age: 65
End: 2025-03-26
Payer: MEDICARE

## 2025-03-26 NOTE — PROGRESS NOTES
Spoke with pt to check in regarding Collaborative Care referral. Explored if he would like to schedule with me. Pt reports he has many medical issues and attends several appointments during the week and feels right now is not the best time for him to engage. Advised pt that if this changes in the future, to speak to PCP to be re-referred to programming for mental health support. He is agreeable. Will close referral.

## 2025-03-29 RX ORDER — NEBULIZER AND COMPRESSOR
1 EACH MISCELLANEOUS ONCE
Qty: 1 EACH | Refills: 0 | Status: SHIPPED | OUTPATIENT
Start: 2025-03-29 | End: 2025-03-29

## 2025-04-02 ENCOUNTER — PATIENT OUTREACH (OUTPATIENT)
Dept: PRIMARY CARE | Facility: CLINIC | Age: 65
End: 2025-04-02
Payer: MEDICARE

## 2025-04-02 ENCOUNTER — DOCUMENTATION (OUTPATIENT)
Dept: PRIMARY CARE | Facility: CLINIC | Age: 65
End: 2025-04-02
Payer: MEDICARE

## 2025-04-02 NOTE — PROGRESS NOTES
Two attempts were made to reach patient after discharge. RN CM unable contact patient for TCM to be completed, nor leave voicemail.     Discharge Facility: [facility]   CC: Neutropenic fever   DX: Acute myeloid leukemia     Admit Date: 3/28/2025  Discharge Date: 4/1/2025   PCP Appt: Tasked to office  Specialty Appt: Hem/Onc 4/2/2025

## 2025-04-07 ENCOUNTER — DOCUMENTATION (OUTPATIENT)
Dept: PRIMARY CARE | Facility: CLINIC | Age: 65
End: 2025-04-07
Payer: COMMERCIAL

## 2025-04-15 ENCOUNTER — DOCUMENTATION (OUTPATIENT)
Dept: PRIMARY CARE | Facility: CLINIC | Age: 65
End: 2025-04-15
Payer: COMMERCIAL

## 2025-04-16 ENCOUNTER — APPOINTMENT (OUTPATIENT)
Dept: OPHTHALMOLOGY | Facility: CLINIC | Age: 65
End: 2025-04-16
Payer: MEDICARE

## 2025-04-23 ENCOUNTER — PATIENT OUTREACH (OUTPATIENT)
Dept: PRIMARY CARE | Facility: CLINIC | Age: 65
End: 2025-04-23
Payer: COMMERCIAL

## 2025-04-23 ENCOUNTER — TELEPHONE (OUTPATIENT)
Dept: PRIMARY CARE | Facility: CLINIC | Age: 65
End: 2025-04-23
Payer: COMMERCIAL

## 2025-04-23 DIAGNOSIS — D62 ACUTE ON CHRONIC BLOOD LOSS ANEMIA: ICD-10-CM

## 2025-04-23 NOTE — TELEPHONE ENCOUNTER
Home health care nurse call.  Requesting PT and   Gait strengthening and  for waiver programs.       Call back [phone]  Katherine

## 2025-04-23 NOTE — PROGRESS NOTES
Discharge facility: [facility]  Discharge diagnosis: Acute on chronic blood loss anemia  Admission date: 04/18/25  Discharge date: 04/22/25    PCP Appointment Date: TBD - Needs to be seen on or before 05/05/25 to qualify for TCM. A task was sent to Practice Clinical Pool to assist with scheduling patient for 14 day follow-up.     Specialist Appointment Date: 04/29/25, Palliative Medicine with [facility]  Hospital Encounter and Summary: Linked     Hospital Encounter with Maxime Lindsay MD; Geoffrey Ray MD; Narendra Gordon MD; Mary Beth Hardy MD; Carmelo Lambert MD      See Discharge assessment below for further details     Wrap Up  Is the patient/caregiver familiar with Advance Care Planning?: Yes (4/23/2025 12:04 PM)  Would the patient like more information on Advance Care Planning?: No (4/23/2025 12:04 PM)  Call End Time: 1220 (4/23/2025 12:04 PM)    Engagement  Call Start Time: 1159 (4/23/2025 12:04 PM)    Medications  Medications reviewed with patient/caregiver?: Yes (4/23/2025 12:04 PM)  Is the patient having any side effects they believe may be caused by any medication additions or changes?: No (4/23/2025 12:04 PM)  Does the patient have all medications ordered at discharge?: Yes (4/23/2025 12:04 PM)  Care Management Interventions: No intervention needed (4/23/2025 12:04 PM)  Prescription Comments: New orders to STOP Omeprazole & Stiolto Respimat. START Pantoprazole, Polyethylene Glycol & Senna (4/23/2025 12:04 PM)  Is the patient taking all medications as directed (includes completed medication regime)?: Yes (4/23/2025 12:04 PM)    Appointments  Does the patient have a primary care provider?: Yes (4/23/2025 12:04 PM)  Care Management Interventions: Educated patient on importance of making appointment (4/23/2025 12:04 PM)  Has the patient kept scheduled appointments due by today?: No (4/23/2025 12:04 PM)  What is preventing the patient from keeping their appointments?: Transportation (4/23/2025 12:04  PM)  Care Management Interventions: Advised patient to keep appointment; Educated on importance of keeping appointment (4/23/2025 12:04 PM)  Follow Up Tasks: Appointments (4/23/2025 12:04 PM)    Self Management  What is the home health agency?: [facility] (4/23/2025 12:04 PM)  Has home health visited the patient within 72 hours of discharge?: No (Scheduled to start today, 04/23/25) (4/23/2025 12:04 PM)  What Durable Medical Equipment (DME) was ordered?: N/A (4/23/2025 12:04 PM)    Patient Teaching  Does the patient have access to their discharge instructions?: Yes (Pt says discharge instructions were in his car at time of outreach) (4/23/2025 12:04 PM)  Care Management Interventions: Reviewed instructions with patient (4/23/2025 12:04 PM)  What is the patient's perception of their health status since discharge?: Improving (4/23/2025 12:04 PM)  Is the patient/caregiver able to teach back the hierarchy of who to call/visit for symptoms/problems? PCP, Specialist, Home Health nurse, Urgent Care, ED, 911: Yes (4/23/2025 12:04 PM)

## 2025-04-25 NOTE — TELEPHONE ENCOUNTER
Home care nurse calling back to check on status of prior request. Informed Katherine that [facility] is out of office until Monday.

## 2025-05-02 ENCOUNTER — TELEPHONE (OUTPATIENT)
Dept: PRIMARY CARE | Facility: CLINIC | Age: 65
End: 2025-05-02
Payer: COMMERCIAL

## 2025-05-02 NOTE — TELEPHONE ENCOUNTER
DR AMADO VELÁSQUEZ FROM [facility] CALLED AND STATED PT IS SCHED FOR PT NEXT WEEK. PT HAD TO MANY APTS THIS WEEK AND NEEDED SOME TIME BEFORE PT

## 2025-05-07 ENCOUNTER — PATIENT OUTREACH (OUTPATIENT)
Dept: PRIMARY CARE | Facility: CLINIC | Age: 65
End: 2025-05-07
Payer: COMMERCIAL

## 2025-05-07 DIAGNOSIS — F31.5 BIPOLAR DISORDER, CURRENT EPISODE DEPRESSED, SEVERE, WITH PSYCHOTIC FEATURES (MULTI): ICD-10-CM

## 2025-05-07 DIAGNOSIS — I10 HYPERTENSION, UNSPECIFIED TYPE: ICD-10-CM

## 2025-05-07 NOTE — PROGRESS NOTES
"Pt does not qualify for TCM billing as he did not have PCP hospital follow up within 14 days of hospital discharge.    Care Management Monthly Outreach  Chart review completed  Confirmation of at least 2 patient identifiers  Change in insurance? No    Has patient been to ER/Urgent Care since last outreach? No    Last Office Visit with PCP: 11/22/2024   Next Office Visit with PCP: 5/22/2025   APC Collaboration: n/a    Chronic Conditions and Outreach Summary:   Bipolar disorder, current episode depressed, severe, with psychotic features (Multi)    Hypertension, unspecified type    Pt's main concerns this outreach are pain, mobility and physical debility.     Pt spoke about how much his illness is affecting those around him. Pt is unable to do the things he used to do and he's a different partner to his girlfriend. He is feeling better since his last hospitalization as his blood counts have not been dropping as drastically between infusions. However, he is still struggling with fatigue, pain, and weakness. PT and nursing are coming to his home today.     Pt is wanting to know if he can qualify for a motorized wheelchair. Advised pt that he needs to have a face to face visit with Dr. Tucker to make that determination and initiate the process. Pt's last office visit was 11/22/24. Assisted pt with scheduling a PCP visit on 05/22/25 @ 1045. Pt asked this RN CM for a reminder call the day before.     Pt says he is struggling with pain. He describes it as \"fire\" going through his body. Pt says he received Fentanyl at [facility] last admit and it resolved. Pt plans to discuss possibility of a pain patch at his PCP visit.     Pt does not have BP concerns. No s/s of hypertension or hypotension. Last 3 documented BP with [facility]:  05/06/25 - 137/62  05/02/25 - 117/52  04/29/25 - 135/78    Medications:   Are there medication changes since last visit? No  Refills needed? No    Social Drivers of Health: Complete  Care Gaps " Addressed? Needs Complete  Care Plan addressed: Yes    Upcoming Appointments:   Future Appointments       Date / Time Provider Department Dept Phone    5/19/2025 1:40 PM Hattie García, APRN-CNP [facility] [phone]    5/22/2025 10:45 AM (Arrive by 10:30 AM) Mandeep Tucker DO [facility] [phone]          Blood Pressures Reviewed  BP Readings from Last 3 Encounters:   02/13/25 133/66   02/10/25 149/73   02/06/25 116/69     Labs Reviewed:  Lab Results   Component Value Date    CREATININE 1.10 02/10/2025    GLUCOSE 105 (H) 02/10/2025    ALKPHOS 82 02/10/2025    K 3.9 02/10/2025    PROT 7.4 02/10/2025     02/10/2025    CALCIUM 8.8 02/10/2025    AST 38 02/10/2025    ALT 85 (H) 02/10/2025    BUN 16 02/10/2025    MG 1.86 11/16/2024     (H) 12/05/2024    GFRMALE 81 08/28/2023     Lab Results   Component Value Date    TRIG 110 10/11/2024    CHOL 120 10/11/2024    LDLCALC 57 10/11/2024    HDL 40.6 10/11/2024     Lab Results   Component Value Date    HGBA1C 6.4 09/13/2024    HGBA1C 6.1 08/24/2023     Lab Results   Component Value Date    WBC 1.4 (L) 02/13/2025    RBC 2.37 (L) 02/13/2025    HGB 7.2 (L) 02/13/2025    PLT 12 (LL) 02/13/2025   No other concerns at this time.  Agreeable to continue monthly outreaches.  Encouraged to call if questions or concerns arise.    Linnette Cadena, RN  Chronic Care Manager  [phone]

## 2025-05-14 DIAGNOSIS — I10 PRIMARY HYPERTENSION: ICD-10-CM

## 2025-05-14 RX ORDER — LOSARTAN POTASSIUM AND HYDROCHLOROTHIAZIDE 12.5; 5 MG/1; MG/1
1 TABLET ORAL DAILY
Qty: 30 TABLET | Refills: 0 | Status: SHIPPED | OUTPATIENT
Start: 2025-05-14

## 2025-05-14 NOTE — TELEPHONE ENCOUNTER
Recent Visits  Date Type Provider Dept   11/22/24 Office Visit Mandeep Tucker, DO [facility]   11/05/24 Office Visit Mandeep Tucker, DO [facility]   09/13/24 Office Visit Mandeep Tucker, DO [facility]   Showing recent visits within past 365 days and meeting all other requirements  Future Appointments  Date Type Provider Dept   05/22/25 Appointment Mandeep Tucker, DO [facility]   Showing future appointments within next 90 days and meeting all other requirements

## 2025-05-19 ENCOUNTER — APPOINTMENT (OUTPATIENT)
Dept: PULMONOLOGY | Facility: CLINIC | Age: 65
End: 2025-05-19
Payer: MEDICARE

## 2025-05-19 ENCOUNTER — PATIENT OUTREACH (OUTPATIENT)
Dept: PRIMARY CARE | Facility: CLINIC | Age: 65
End: 2025-05-19

## 2025-05-19 DIAGNOSIS — D46.Z MYELODYSPLASIA PRESENT IN BONE MARROW (MULTI): ICD-10-CM

## 2025-05-19 DIAGNOSIS — D64.9 SYMPTOMATIC ANEMIA: ICD-10-CM

## 2025-05-19 DIAGNOSIS — D62 ACUTE ON CHRONIC BLOOD LOSS ANEMIA: ICD-10-CM

## 2025-05-19 NOTE — PROGRESS NOTES
Discharge facility: [facility]  Discharge diagnosis: MDS, Chronic Anemia  Admission date: 05/11/25  Discharge date: 05/16/25    PCP Appointment Date: 05/22/25 @ 1045  Specialist Appointment Date: 05/21/25 Urology      06/10/25 Hematology / Oncology      06/16/25 Pulmonology      07/10/25 Gastroenterology    Hospital Encounter and Summary: Linked   Hospital Encounter with Jewel Jin MD; Geoffrey Welsh DO; Barby Woods DO; Claudia Candelaria DO; Julián Alanis MD      See Discharge assessment below for further details     Wrap Up  Is the patient/caregiver familiar with Advance Care Planning?: Yes (5/19/2025  9:37 AM)  Would the patient like more information on Advance Care Planning?: No (5/19/2025  9:37 AM)  Wrap Up Additional Comments: Pt reports his stools today are brown, no sign of bleeding. Pt met with palliative medicine in the hospital, discussed hospice option. Pt would like to continue curative measures at this time. (5/19/2025  9:37 AM)  Call End Time: 0942 (5/19/2025  9:37 AM)    Engagement  Call Start Time: 0929 (5/19/2025  9:37 AM)    Medications  Medications reviewed with patient/caregiver?: Yes (5/19/2025  9:37 AM)  Is the patient having any side effects they believe may be caused by any medication additions or changes?: No (5/19/2025  9:37 AM)  Does the patient have all medications ordered at discharge?: Yes (5/19/2025  9:37 AM)  Care Management Interventions: No intervention needed (5/19/2025  9:37 AM)  Is the patient taking all medications as directed (includes completed medication regime)?: Yes (5/19/2025  9:37 AM)    Appointments  Does the patient have a primary care provider?: Yes (5/19/2025  9:37 AM)  Care Management Interventions: Verified appointment date/time/provider (5/19/2025  9:37 AM)  Has the patient kept scheduled appointments due by today?: No (5/19/2025  9:37 AM)  What is preventing the patient from keeping their appointments?: -- (Frequent hospital admissions)  (5/19/2025  9:37 AM)  Care Management Interventions: Advised patient to keep appointment; Educated on importance of keeping appointment (5/19/2025  9:37 AM)    Self Management  What is the home health agency?: [facility] (5/19/2025  9:37 AM)  Has home health visited the patient within 72 hours of discharge?: No (Per pt, will have PT and nursing. Nursing to start 5/21/25) (5/19/2025  9:37 AM)  What Durable Medical Equipment (DME) was ordered?: N/A (5/19/2025  9:37 AM)    Patient Teaching  Does the patient have access to their discharge instructions?: No (Not at time of call. Pt getting ready to get in the shower as he has to be at an appointment by 1130) (5/19/2025  9:37 AM)  Care Management Interventions: Reviewed instructions with patient (5/19/2025  9:37 AM)  What is the patient's perception of their health status since discharge?: Same (5/19/2025  9:37 AM)  Is the patient/caregiver able to teach back the hierarchy of who to call/visit for symptoms/problems? PCP, Specialist, Home Health nurse, Urgent Care, ED, 911: Yes (5/19/2025  9:37 AM)

## 2025-05-21 ENCOUNTER — TELEPHONE (OUTPATIENT)
Dept: PRIMARY CARE | Facility: CLINIC | Age: 65
End: 2025-05-21
Payer: COMMERCIAL

## 2025-05-22 ENCOUNTER — OFFICE VISIT (OUTPATIENT)
Dept: PRIMARY CARE | Facility: CLINIC | Age: 65
End: 2025-05-22
Payer: COMMERCIAL

## 2025-05-22 ENCOUNTER — APPOINTMENT (OUTPATIENT)
Dept: PRIMARY CARE | Facility: CLINIC | Age: 65
End: 2025-05-22
Payer: COMMERCIAL

## 2025-05-22 VITALS
RESPIRATION RATE: 19 BRPM | HEIGHT: 66 IN | OXYGEN SATURATION: 95 % | DIASTOLIC BLOOD PRESSURE: 62 MMHG | BODY MASS INDEX: 50.62 KG/M2 | WEIGHT: 315 LBS | TEMPERATURE: 98 F | HEART RATE: 95 BPM | SYSTOLIC BLOOD PRESSURE: 118 MMHG

## 2025-05-22 DIAGNOSIS — S06.6X0D: ICD-10-CM

## 2025-05-22 DIAGNOSIS — J41.8 MIXED SIMPLE AND MUCOPURULENT CHRONIC BRONCHITIS (MULTI): ICD-10-CM

## 2025-05-22 DIAGNOSIS — D46.9 MYELODYSPLASTIC SYNDROME, UNSPECIFIED (MULTI): Primary | ICD-10-CM

## 2025-05-22 DIAGNOSIS — E66.01 MORBID (SEVERE) OBESITY DUE TO EXCESS CALORIES (MULTI): ICD-10-CM

## 2025-05-22 DIAGNOSIS — D61.818 OTHER PANCYTOPENIA (MULTI): ICD-10-CM

## 2025-05-22 DIAGNOSIS — J43.2 CENTRILOBULAR EMPHYSEMA (MULTI): ICD-10-CM

## 2025-05-22 DIAGNOSIS — D47.1 PRIMARY MYELOFIBROSIS (MULTI): ICD-10-CM

## 2025-05-22 DIAGNOSIS — C94.6 MDS/MPN (MYELODYSPLASTIC/MYELOPROLIFERATIVE NEOPLASMS): ICD-10-CM

## 2025-05-22 DIAGNOSIS — J96.11 CHRONIC RESPIRATORY FAILURE WITH HYPOXIA: ICD-10-CM

## 2025-05-22 PROBLEM — J44.9 CHRONIC OBSTRUCTIVE PULMONARY DISEASE (MULTI): Status: RESOLVED | Noted: 2024-10-30 | Resolved: 2025-05-22

## 2025-05-22 PROCEDURE — 99496 TRANSJ CARE MGMT HIGH F2F 7D: CPT | Performed by: FAMILY MEDICINE

## 2025-05-22 RX ORDER — OXYCODONE HYDROCHLORIDE 10 MG/1
10 TABLET ORAL EVERY 6 HOURS PRN
COMMUNITY
Start: 2025-05-16

## 2025-05-22 RX ORDER — GABAPENTIN 300 MG/1
300 CAPSULE ORAL
COMMUNITY
Start: 2025-05-16 | End: 2025-08-14

## 2025-05-22 RX ORDER — POLYETHYLENE GLYCOL 3350 17 G/17G
17 POWDER, FOR SOLUTION ORAL
COMMUNITY
Start: 2025-04-22

## 2025-05-22 RX ORDER — DOCUSATE SODIUM 50MG AND SENNOSIDES 8.6MG 8.6; 5 MG/1; MG/1
1 TABLET, FILM COATED ORAL
COMMUNITY
Start: 2025-03-16

## 2025-05-22 RX ORDER — HYDROCHLOROTHIAZIDE 12.5 MG/1
12.5 TABLET ORAL
COMMUNITY
Start: 2025-04-01 | End: 2025-06-30

## 2025-05-22 RX ORDER — OMEPRAZOLE 40 MG/1
1 CAPSULE, DELAYED RELEASE ORAL
COMMUNITY
Start: 2025-05-16

## 2025-05-22 RX ORDER — SUCRALFATE 1 G/1
1 TABLET ORAL
COMMUNITY
Start: 2025-05-16 | End: 2025-06-15

## 2025-05-22 RX ORDER — LEVOFLOXACIN 500 MG/1
500 TABLET, FILM COATED ORAL
COMMUNITY
Start: 2025-04-29

## 2025-05-22 RX ORDER — PANTOPRAZOLE SODIUM 40 MG/1
40 TABLET, DELAYED RELEASE ORAL
COMMUNITY
Start: 2025-04-22 | End: 2025-06-01

## 2025-05-22 RX ORDER — SENNOSIDES 8.6 MG/1
8.6 TABLET ORAL 2 TIMES DAILY
COMMUNITY
Start: 2025-04-29

## 2025-05-22 ASSESSMENT — PATIENT HEALTH QUESTIONNAIRE - PHQ9
1. LITTLE INTEREST OR PLEASURE IN DOING THINGS: NOT AT ALL
SUM OF ALL RESPONSES TO PHQ9 QUESTIONS 1 AND 2: 0
2. FEELING DOWN, DEPRESSED OR HOPELESS: NOT AT ALL

## 2025-05-22 ASSESSMENT — ENCOUNTER SYMPTOMS
LOSS OF SENSATION IN FEET: 0
DEPRESSION: 0
OCCASIONAL FEELINGS OF UNSTEADINESS: 0

## 2025-05-22 NOTE — PROGRESS NOTES
Subjective   Patient ID: Jaycee Harp is a 65 y.o. male who presents for Hospital Follow-up (05/11/2025-05/16/2025; [facility]; diagnose chronic anemia) and Med Management (Patient would like to have all his prescription prescribed for doctor Tucker.).  History of Present Illness  The patient is a 65-year-old  male who comes in after being seen at the [facility] and hospitalized for chronic anemia from 05/11/2025 through 05/16/2025. He would also like to have prescriptions by [facility].    He reports satisfactory strength levels and has successfully ceased smoking and alcohol consumption. He has discontinued the use of potassium supplements and Chantix. His current medication regimen includes Prilosec, Carafate, losartan, hydrochlorothiazide, oxycodone, Breztri inhaler, albuterol inhaler, Cialis, gabapentin, acyclovir, levothyroxine, and Senokot. He has discontinued Mounjaro and Vistaril. He uses MiraLAX as needed for constipation.    The patient is scheduled for a blood transfusion tomorrow. He maintains a healthy diet and reports no issues with appetite or sleep. He has been attempting to gradually reduce his oxygen dependency.    SOCIAL HISTORY  He quit smoking and drinking alcohol.    Review of Systems   Constitutional: Negative.  Negative for activity change, appetite change, fatigue and fever.   HENT:  Negative for congestion, dental problem, ear discharge, ear pain, mouth sores, rhinorrhea, sinus pressure, sinus pain, sore throat, tinnitus and trouble swallowing.    Eyes:  Negative for photophobia, pain and visual disturbance.   Respiratory:  Negative for cough, chest tightness, shortness of breath and stridor.    Cardiovascular:  Negative for chest pain and palpitations.   Gastrointestinal:  Negative for abdominal distention, abdominal pain, blood in stool, constipation, diarrhea and rectal pain.   Endocrine: Negative for cold intolerance, heat intolerance, polydipsia, polyphagia and  "polyuria.   Genitourinary:  Negative for dysuria, flank pain, hematuria and urgency.   Musculoskeletal:  Negative for arthralgias, gait problem, myalgias and neck stiffness.   Skin:  Negative for color change and rash.   Allergic/Immunologic: Negative for environmental allergies and food allergies.   Neurological:  Negative for dizziness, seizures, syncope, speech difficulty and headaches.   Hematological:  Negative for adenopathy.   Psychiatric/Behavioral:  Negative for agitation, confusion, decreased concentration, dysphoric mood and sleep disturbance. The patient is not nervous/anxious.    The above were reviewed and noted negative except as noted in HPI and Problem List.    Objective     /62 (BP Location: Right arm, Patient Position: Sitting, BP Cuff Size: Adult)   Pulse 95   Temp 36.7 °C (98 °F) (Temporal)   Resp 19   Ht 1.676 m (5' 6\")   Wt 144 kg (318 lb)   SpO2 95%   BMI 51.33 kg/m²      Physical Exam    Constitutional: Well developed, well nourished, alert and in no acute distress   Eyes: Normal external exam. Pupils equally round and reactive to light with normal accommodation and extraocular movements intact.  Neck: Supple, no lymphadenopathy or masses.   Cardiovascular: Regular rate and rhythm, normal S1 and S2, no murmurs, gallops, or rubs. Radial pulses normal. No peripheral edema.  Pulmonary: No respiratory distress, lungs clear to auscultation bilaterally. No wheezes, rhonchi, rales.  Abdomen: soft,non tender, non distended, without masses or HSM  Skin: Warm, well perfused, normal skin turgor and color.   Neurologic: Cranial nerves II-XII grossly intact.   Psychiatric: Mood calm and affect normal  Musculoskeletal: Moving all extremities without restriction  The above were reviewed and noted negative except as noted in HPI and Problem List.    Problem List Items Addressed This Visit    None       Assessment & Plan  1. Chronic Anemia.  - He was recently hospitalized for chronic anemia from " 05/11/2025 through 05/16/2025.  - Hemoglobin is currently 8.4 with a hematocrit of 24.  - He receives regular blood transfusions and injections to manage his condition; reports leg swelling after injections.  - Advised to continue with current treatment plan and monitor for any signs of bleeding or black stools.    2. Medication Management.  - Currently taking Prilosec, Carafate, losartan, hydrochlorothiazide, oxycodone, Breztri inhaler, albuterol inhaler, Cialis, gabapentin, acyclovir, levothyroxine, and Senokot.  - Stopped taking potassium, Chantix, Mounjaro, and Vistaril; uses MiraLAX as needed for constipation.  - Medications will be managed through .    3. Oxygen Therapy.  - Oxygen saturation is slightly low at 82%.  - Advised to continue using oxygen therapy as needed to manage shortness of breath.  - Provided with an oxygen tank to take home and return at the next visit.    Results  Labs   - AST: 70 U/L   - ALT: 109 U/L   - Blood Sugar: 118 mg/dL   - White Count: 11.86 x10^9/L   - Hemoglobin: 8.4 g/dL   - Hematocrit: 24%       Mandeep Tucker,      This medical note was created with the assistance of artificial intelligence (AI) for documentation purposes. The content has been reviewed and confirmed by the healthcare provider for accuracy and completeness. Patient consented to the use of audio recording and use of AI during their visit.

## 2025-05-22 NOTE — PATIENT INSTRUCTIONS
Follow up in    Continue current medications and therapy for chronic medical conditions.    Patient was advised importance of proper diet/nutrition in addition adequate hydration. Patient was encouraged moderate exercise program to include 30 minutes daily for 5 days of the week or 150 minutes weekly. Patient will follow-up with us as scheduled.

## 2025-05-28 ENCOUNTER — PATIENT OUTREACH (OUTPATIENT)
Dept: PRIMARY CARE | Facility: CLINIC | Age: 65
End: 2025-05-28
Payer: COMMERCIAL

## 2025-05-28 DIAGNOSIS — D46.9 MYELODYSPLASTIC SYNDROME, UNSPECIFIED (MULTI): ICD-10-CM

## 2025-05-28 DIAGNOSIS — D62 ACUTE ON CHRONIC BLOOD LOSS ANEMIA: ICD-10-CM

## 2025-05-28 NOTE — PROGRESS NOTES
Unable to reach patient for call back after patient's follow up appointment with PCP. LVM with call back number for patient to call if needed.

## 2025-05-29 ENCOUNTER — TELEPHONE (OUTPATIENT)
Dept: PRIMARY CARE | Facility: CLINIC | Age: 65
End: 2025-05-29
Payer: COMMERCIAL

## 2025-06-07 DIAGNOSIS — I10 PRIMARY HYPERTENSION: ICD-10-CM

## 2025-06-08 ENCOUNTER — HOSPITAL ENCOUNTER (EMERGENCY)
Age: 65
Discharge: HOME | End: 2025-06-08
Attending: EMERGENCY MEDICINE
Payer: MEDICAID

## 2025-06-08 VITALS
DIASTOLIC BLOOD PRESSURE: 88 MMHG | SYSTOLIC BLOOD PRESSURE: 144 MMHG | TEMPERATURE: 98.1 F | RESPIRATION RATE: 20 BRPM | HEART RATE: 86 BPM | BODY MASS INDEX: 45.35 KG/M2 | HEIGHT: 69 IN | OXYGEN SATURATION: 98 % | WEIGHT: 306.2 LBS

## 2025-06-08 DIAGNOSIS — D69.6 THROMBOCYTOPENIA: ICD-10-CM

## 2025-06-08 DIAGNOSIS — R23.3 PETECHIAE: Primary | ICD-10-CM

## 2025-06-08 LAB
ABO/RH: NORMAL
ACANTHOCYTES BLD QL SMEAR: ABNORMAL
ALBUMIN SERPL-MCNC: 3.5 G/DL (ref 3.5–4.6)
ALP SERPL-CCNC: 72 U/L (ref 35–104)
ALT SERPL-CCNC: 61 U/L (ref 0–41)
ANION GAP SERPL CALCULATED.3IONS-SCNC: 9 MEQ/L (ref 9–15)
ANISOCYTOSIS BLD QL SMEAR: ABNORMAL
ANTIBODY SCREEN: NORMAL
APTT PPP: 49.8 SEC (ref 24.4–36.8)
AST SERPL-CCNC: 35 U/L (ref 0–40)
BASOPHILS # BLD: 0 K/UL (ref 0–0.2)
BASOPHILS NFR BLD: 0.4 %
BILIRUB SERPL-MCNC: 0.6 MG/DL (ref 0.2–0.7)
BUN SERPL-MCNC: 12 MG/DL (ref 8–23)
CALCIUM SERPL-MCNC: 9.1 MG/DL (ref 8.5–9.9)
CHLORIDE SERPL-SCNC: 90 MEQ/L (ref 95–107)
CO2 SERPL-SCNC: 37 MEQ/L (ref 20–31)
CREAT SERPL-MCNC: 1.12 MG/DL (ref 0.7–1.2)
DAT IGG: NORMAL
DAT POLY-SP REAG RBC QL: NORMAL
EOSINOPHIL # BLD: 0.4 K/UL (ref 0–0.7)
EOSINOPHIL NFR BLD: 1 %
ERYTHROCYTE [DISTWIDTH] IN BLOOD BY AUTOMATED COUNT: 13.2 % (ref 11.5–14.5)
ERYTHROCYTE [DISTWIDTH] IN BLOOD BY AUTOMATED COUNT: 13.2 % (ref 11.5–14.5)
GLOBULIN SER CALC-MCNC: 4.5 G/DL (ref 2.3–3.5)
GLUCOSE SERPL-MCNC: 109 MG/DL (ref 70–99)
HCT VFR BLD AUTO: 21.2 % (ref 42–52)
HCT VFR BLD AUTO: 24.9 % (ref 42–52)
HGB BLD-MCNC: 7.3 G/DL (ref 14–18)
HGB BLD-MCNC: 8.5 G/DL (ref 14–18)
HYPOCHROMIA BLD QL SMEAR: ABNORMAL
INR PPP: 1.3
LACTATE BLDV-SCNC: 1.2 MMOL/L (ref 0.5–2.2)
LYMPHOCYTES # BLD: 5.2 K/UL (ref 1–4.8)
LYMPHOCYTES NFR BLD: 10 %
MCH RBC QN AUTO: 29.1 PG (ref 27–31.3)
MCH RBC QN AUTO: 29.6 PG (ref 27–31.3)
MCHC RBC AUTO-ENTMCNC: 34.1 % (ref 33–37)
MCHC RBC AUTO-ENTMCNC: 34.4 % (ref 33–37)
MCV RBC AUTO: 85.3 FL (ref 79–92.2)
MCV RBC AUTO: 85.8 FL (ref 79–92.2)
MICROCYTES BLD QL SMEAR: ABNORMAL
MONOCYTES # BLD: 12.4 K/UL (ref 0.2–0.8)
MONOCYTES NFR BLD: 30.5 %
NEUTROPHILS # BLD: 22.4 K/UL (ref 1.4–6.5)
NEUTS SEG NFR BLD: 56 %
PATH INTERP BLD-IMP: YES
PLATELET # BLD AUTO: 16 K/UL (ref 130–400)
PLATELET # BLD AUTO: 5 K/UL (ref 130–400)
PLATELET BLD QL SMEAR: ABNORMAL
POIKILOCYTOSIS BLD QL SMEAR: ABNORMAL
POTASSIUM SERPL-SCNC: 3.7 MEQ/L (ref 3.4–4.9)
PROT SERPL-MCNC: 8 G/DL (ref 6.3–8)
PROTHROMBIN TIME: 16.2 SEC (ref 12.3–14.9)
RBC # BLD AUTO: 2.47 M/UL (ref 4.7–6.1)
RBC # BLD AUTO: 2.92 M/UL (ref 4.7–6.1)
SLIDE REVIEW: ABNORMAL
SMUDGE CELLS BLD QL SMEAR: 10.5
SODIUM SERPL-SCNC: 136 MEQ/L (ref 135–144)
STOMATOCYTES BLD QL SMEAR: ABNORMAL
VARIANT LYMPHS NFR BLD: 3 %
WBC # BLD AUTO: 35.5 K/UL (ref 4.8–10.8)
WBC # BLD AUTO: 40 K/UL (ref 4.8–10.8)

## 2025-06-08 PROCEDURE — 86850 RBC ANTIBODY SCREEN: CPT

## 2025-06-08 PROCEDURE — 83605 ASSAY OF LACTIC ACID: CPT

## 2025-06-08 PROCEDURE — 85610 PROTHROMBIN TIME: CPT

## 2025-06-08 PROCEDURE — 99285 EMERGENCY DEPT VISIT HI MDM: CPT

## 2025-06-08 PROCEDURE — 87040 BLOOD CULTURE FOR BACTERIA: CPT

## 2025-06-08 PROCEDURE — 86870 RBC ANTIBODY IDENTIFICATION: CPT

## 2025-06-08 PROCEDURE — 85025 COMPLETE CBC W/AUTO DIFF WBC: CPT

## 2025-06-08 PROCEDURE — 36430 TRANSFUSION BLD/BLD COMPNT: CPT

## 2025-06-08 PROCEDURE — 86880 COOMBS TEST DIRECT: CPT

## 2025-06-08 PROCEDURE — 85730 THROMBOPLASTIN TIME PARTIAL: CPT

## 2025-06-08 PROCEDURE — P9073 PLATELETS PHERESIS PATH REDU: HCPCS

## 2025-06-08 PROCEDURE — 80053 COMPREHEN METABOLIC PANEL: CPT

## 2025-06-08 PROCEDURE — 86901 BLOOD TYPING SEROLOGIC RH(D): CPT

## 2025-06-08 PROCEDURE — 86900 BLOOD TYPING SEROLOGIC ABO: CPT

## 2025-06-08 PROCEDURE — 36415 COLL VENOUS BLD VENIPUNCTURE: CPT

## 2025-06-08 PROCEDURE — 85027 COMPLETE CBC AUTOMATED: CPT

## 2025-06-08 PROCEDURE — 6370000000 HC RX 637 (ALT 250 FOR IP): Performed by: EMERGENCY MEDICINE

## 2025-06-08 RX ORDER — SODIUM CHLORIDE 9 MG/ML
INJECTION, SOLUTION INTRAVENOUS PRN
Status: DISCONTINUED | OUTPATIENT
Start: 2025-06-08 | End: 2025-06-08 | Stop reason: HOSPADM

## 2025-06-08 RX ORDER — OXYCODONE AND ACETAMINOPHEN 5; 325 MG/1; MG/1
2 TABLET ORAL ONCE
Refills: 0 | Status: COMPLETED | OUTPATIENT
Start: 2025-06-08 | End: 2025-06-08

## 2025-06-08 RX ORDER — ONDANSETRON 4 MG/1
4 TABLET, ORALLY DISINTEGRATING ORAL ONCE
Status: COMPLETED | OUTPATIENT
Start: 2025-06-08 | End: 2025-06-08

## 2025-06-08 RX ADMIN — OXYCODONE AND ACETAMINOPHEN 2 TABLET: 5; 325 TABLET ORAL at 17:38

## 2025-06-08 RX ADMIN — ONDANSETRON 4 MG: 4 TABLET, ORALLY DISINTEGRATING ORAL at 17:38

## 2025-06-08 ASSESSMENT — PAIN DESCRIPTION - LOCATION: LOCATION: LEG;BACK

## 2025-06-08 ASSESSMENT — PAIN SCALES - GENERAL: PAINLEVEL_OUTOF10: 9

## 2025-06-08 ASSESSMENT — LIFESTYLE VARIABLES
HOW MANY STANDARD DRINKS CONTAINING ALCOHOL DO YOU HAVE ON A TYPICAL DAY: PATIENT DOES NOT DRINK
HOW OFTEN DO YOU HAVE A DRINK CONTAINING ALCOHOL: NEVER

## 2025-06-08 ASSESSMENT — PAIN DESCRIPTION - DESCRIPTORS: DESCRIPTORS: ACHING

## 2025-06-08 ASSESSMENT — PAIN - FUNCTIONAL ASSESSMENT: PAIN_FUNCTIONAL_ASSESSMENT: NONE - DENIES PAIN

## 2025-06-08 ASSESSMENT — PAIN DESCRIPTION - ORIENTATION: ORIENTATION: RIGHT;LEFT

## 2025-06-08 NOTE — ED PROVIDER NOTES
[facility] EMERGENCY DEPARTMENT  eMERGENCY dEPARTMENT eNCOUnter      Pt Name: Ally Corrales  MRN: [id number]  Birthdate 1960  Date of evaluation: 6/8/2025  Provider: John Daniel MD    CHIEF COMPLAINT       Chief Complaint   Patient presents with    OTHER     Petechia, patient states he has \"blood cancer.\" States petechia is worse           HISTORY OF PRESENT ILLNESS   (Location/Symptom, Timing/Onset,Context/Setting, Quality, Duration, Modifying Factors, Severity)  Note limiting factors.   Ally Corrales is a 65 y.o. male who presents to the emergency department with complaints of significant petechiae over arms.  Patient states he does have a myelo fibrotic pathology.  Patient admits he has significantly low platelets.  Patient is concerned about what may be going on.    This kind patient states there has been no trauma.  Patient was recent transfusion for PRBC.  Patient was she does follow with [facility] for his routine oncologic and hematologic care.    There is been no fevers or chills no shortness of breath no chest pain.  No complaint of abdominal pain.    HPI    NursingNotes were reviewed.    REVIEW OF SYSTEMS    (2-9 systems for level 4, 10 or more for level 5)     Review of Systems   Constitutional:  Negative for activity change, chills and fever.   Eyes: Negative.    Respiratory:  Negative for shortness of breath and wheezing.    Cardiovascular:  Negative for chest pain.   Gastrointestinal:  Negative for abdominal pain, nausea and vomiting.   Endocrine: Negative.    Genitourinary:  Negative for dysuria and frequency.   Musculoskeletal:  Negative for gait problem and neck pain.   Skin:  Positive for color change and rash.   Allergic/Immunologic: Negative.    Neurological:  Positive for speech difficulty. Negative for seizures, syncope and light-headedness.   Hematological:  Bruises/bleeds easily.   Psychiatric/Behavioral: Negative.         Except as noted above the remainder of the review of systems  platelet transfusion at this time.  Patient given informed consent.  Patient undergoing transfusion at this time.  Care is endorsed to Dr. Gar at 1900 hrs.  Medical Decision Making  Amount and/or Complexity of Data Reviewed  Labs: ordered.    Risk  Prescription drug management.      Coding     CONSULTS:  None    PROCEDURES:  Unless otherwise noted below, none     Procedures    FINAL IMPRESSION      1. Petechiae    2. Thrombocytopenia        DISPOSITION/PLAN   DISPOSITION Decision To Discharge 06/08/2025 08:54:47 PM   DISPOSITION CONDITION Stable           PATIENT REFERRED TO:  Danielito Haro DO  [id number] [place] [address]  [place] [address]  [phone]    Call       [facility] Emergency Department  [address] [place], [place] [id number] [phone]    If symptoms worsen      DISCHARGE MEDICATIONS:  Discharge Medication List as of 6/8/2025  8:55 PM             (Please note that portions of this note were completed with a voice recognitionprogram.  Efforts were made to edit the dictations but occasionally words are mis-transcribed.)    John Daniel MD (electronically signed)  Attending Emergency Physician          John Daniel MD  06/09/25 [id number]

## 2025-06-08 NOTE — CONSENT
Informed Consent for Blood Component Transfusion Note    I have discussed with the patient the rationale for blood component transfusion; its benefits in treating or preventing fatigue, organ damage, or death; and its risk which includes mild transfusion reactions, rare risk of blood borne infection, or more serious but rare reactions. I have discussed the alternatives to transfusion, including the risk and consequences of not receiving transfusion. The patient had an opportunity to ask questions and had agreed to proceed with transfusion of blood components.    Electronically signed by John Daniel MD on 6/8/25 at 5:08 PM EDT

## 2025-06-08 NOTE — ED PROVIDER NOTES
MEDICAL SCREENING EXAM     Basic Information   Time Seen: 3:36 PM   Primary Care Provider: Danielito Haro DO     Chief Complaint   Patient presents with    OTHER     Petechia, patient states he has \"blood cancer.\" States petechia is worse        HPI   Ally Corrales is a 65 yrs male who presents with laceration to left arm.  Patient has petechiae and this is getting worse.  Patient has leukemia.   Physical Exam     BP (!) 144/90 (06/08/25 1529)    Temp 98 °F (36.7 °C) (06/08/25 1529)    Pulse 97 (06/08/25 1529)   Resp 14 (06/08/25 1529)    SpO2 97 % (06/08/25 1529)       General: Awake and Alert, no acute distress   CV: RRR, S1, S2   Resp: LCTAB, even and non labored   Other: Diffuse petechiae present.   Impression and Plan   Labs Reviewed - No data to display     No orders to display      Final Impression   I have performed a medical screening exam on Ally Corrales. Based on this patient's chief complaint/symptoms of   Chief Complaint   Patient presents with    OTHER     Petechia, patient states he has \"blood cancer.\" States petechia is worse      and my focused exam, their care will be started and transitioned to provider when room is available       Anitha Mcconnell PA-C  06/08/25 [id number]

## 2025-06-09 ENCOUNTER — PATIENT OUTREACH (OUTPATIENT)
Dept: PRIMARY CARE | Facility: CLINIC | Age: 65
End: 2025-06-09
Payer: COMMERCIAL

## 2025-06-09 DIAGNOSIS — K75.0 HEPATIC ABSCESS (HHS-HCC): ICD-10-CM

## 2025-06-09 DIAGNOSIS — D46.9 MYELODYSPLASTIC SYNDROME, UNSPECIFIED (MULTI): ICD-10-CM

## 2025-06-09 LAB
BLOOD BANK DISPENSE STATUS: NORMAL
BLOOD BANK DISPENSE STATUS: NORMAL
BLOOD BANK PRODUCT CODE: NORMAL
BLOOD BANK PRODUCT CODE: NORMAL
BPU ID: NORMAL
BPU ID: NORMAL
DESCRIPTION BLOOD BANK: NORMAL
DESCRIPTION BLOOD BANK: NORMAL
PATH INTERP BLD-IMP: NORMAL

## 2025-06-09 RX ORDER — LOSARTAN POTASSIUM AND HYDROCHLOROTHIAZIDE 12.5; 5 MG/1; MG/1
1 TABLET ORAL DAILY
Qty: 90 TABLET | Refills: 0 | Status: SHIPPED | OUTPATIENT
Start: 2025-06-09

## 2025-06-09 NOTE — TELEPHONE ENCOUNTER
Pt needs refills on  pantoprazole (ProtoNix) 40 mg EC tablet   hydroCHLOROthiazide (Microzide) 12.5 mg tablet   [facility]/pharmacy #[id number] - [place], [place] - [address]

## 2025-06-09 NOTE — TELEPHONE ENCOUNTER
Recent Visits  Date Type Provider Dept   05/22/25 Office Visit [facility]andeep Tucker DO [facility]   Showing recent visits within past 180 days and meeting all other requirements  Future Appointments  No visits were found meeting these conditions.  Showing future appointments within next 90 days and meeting all other requirements

## 2025-06-09 NOTE — PROGRESS NOTES
Phone call for TCM outreach post hospital discharge on 6/7/25 from [facility]. No answer, left message requesting call back and provided direct contact number.

## 2025-06-10 ENCOUNTER — PATIENT OUTREACH (OUTPATIENT)
Dept: PRIMARY CARE | Facility: CLINIC | Age: 65
End: 2025-06-10
Payer: COMMERCIAL

## 2025-06-10 DIAGNOSIS — D46.9 MYELODYSPLASTIC SYNDROME, UNSPECIFIED (MULTI): ICD-10-CM

## 2025-06-10 DIAGNOSIS — K75.0 HEPATIC ABSCESS (HHS-HCC): ICD-10-CM

## 2025-06-10 NOTE — PROGRESS NOTES
Discharge facility: [facility]  Discharge diagnosis: Myelodysplastic syndrome, Hepatic abscess  Admission date: 06/03/25  Discharge date: 06/07/25    PCP Appointment Date: N/A  Specialist Appointment Date: 06/10/25 with Hematology/Oncology @ [facility]  Hospital Encounter and Summary: Linked   Hospital Encounter     TCM Outreach completed:  2 attempts were made to reach patient to assess needs. No return call as of this note.    Readmit in 30 days  Patient is not scheduled within 7-14 days of discharge for hospital follow up.

## 2025-06-11 LAB
BLOOD GROUP ANTIBODIES SERPL: NORMAL
BLOOD GROUP ANTIBODIES SERPL: NORMAL
SPECIMEN STATUS: NORMAL
SPECIMEN STATUS: NORMAL

## 2025-06-13 DIAGNOSIS — R11.2 NAUSEA AND VOMITING, UNSPECIFIED VOMITING TYPE: ICD-10-CM

## 2025-06-13 LAB — BACTERIA BLD CULT: NORMAL

## 2025-06-13 NOTE — TELEPHONE ENCOUNTER
Patient of DR. Tucker    Patient is taken antibiotic medication and making him nausea and he is throwing them back up     He is asking if something can be prescribed to help the nausea     Pharmacy     [facility]/pharmacy #[id number] [facility], [place] - [address] Phone: [phone]   Fax: [phone]

## 2025-06-18 ENCOUNTER — PATIENT OUTREACH (OUTPATIENT)
Dept: PRIMARY CARE | Facility: CLINIC | Age: 65
End: 2025-06-18
Payer: COMMERCIAL

## 2025-06-18 DIAGNOSIS — D46.9 MYELODYSPLASTIC SYNDROME, UNSPECIFIED (MULTI): ICD-10-CM

## 2025-06-18 DIAGNOSIS — D69.6 THROMBOCYTOPENIA: ICD-10-CM

## 2025-06-18 RX ORDER — ONDANSETRON 4 MG/1
4 TABLET, ORALLY DISINTEGRATING ORAL EVERY 8 HOURS PRN
Qty: 20 TABLET | Refills: 1 | Status: SHIPPED | OUTPATIENT
Start: 2025-06-18 | End: 2025-06-25

## 2025-06-18 NOTE — PROGRESS NOTES
Phone call to patient for TCM outreach, hospital follow up. No answer, left message requesting call back and provided direct contact number.

## 2025-06-19 ENCOUNTER — PATIENT OUTREACH (OUTPATIENT)
Dept: PRIMARY CARE | Facility: CLINIC | Age: 65
End: 2025-06-19
Payer: COMMERCIAL

## 2025-06-19 DIAGNOSIS — D46.9 MYELODYSPLASTIC SYNDROME, UNSPECIFIED (MULTI): ICD-10-CM

## 2025-06-19 DIAGNOSIS — D69.6 THROMBOCYTOPENIA: ICD-10-CM

## 2025-06-19 NOTE — PROGRESS NOTES
Discharge facility: [facility]  Discharge diagnosis: Thrombocytopenia  Admission date: 06/16/2025  Discharge date:  06/17/2025    PCP Appointment Date: 06/26/2025  Specialist Appointment Date: 06/30/2025 - Pulmonology with Dr. Hinojosa  Hospital Encounter and Summary: Linked   Hospital Encounter with Carmelo Hernández MD; Isaias Sanchez MD; Agustin Ho MD      See Discharge assessment below for further details     Wrap Up  Is the patient/caregiver familiar with Advance Care Planning?: Yes (6/19/2025 11:18 AM)  Would the patient like more information on Advance Care Planning?: No (6/19/2025 11:18 AM)  Wrap Up Additional Comments: Requesting podiatry consult (6/19/2025 11:18 AM)  Call End Time: 1205 (6/19/2025 11:18 AM)    Engagement  Call Start Time: 1116 (6/19/2025 11:18 AM)    Medications  Medications reviewed with patient/caregiver?: No (No medication changes - Pt denies needs) (6/19/2025 11:18 AM)  Is the patient having any side effects they believe may be caused by any medication additions or changes?: No (6/19/2025 11:18 AM)  Does the patient have all medications ordered at discharge?: Yes (6/19/2025 11:18 AM)  Is the patient taking all medications as directed (includes completed medication regime)?: Yes (6/19/2025 11:18 AM)    Appointments  Does the patient have a primary care provider?: Yes (6/19/2025 11:18 AM)  Care Management Interventions: Verified appointment date/time/provider (6/19/2025 11:18 AM)  Has the patient kept scheduled appointments due by today?: Yes (unless hospitalized) (6/19/2025 11:18 AM)  Care Management Interventions: Advised patient to keep appointment; Educated on importance of keeping appointment (6/19/2025 11:18 AM)    Self Management  What is the home health agency?: [facility] (6/19/2025 11:18 AM)  Has home health visited the patient within 72 hours of discharge?: No (6/19/2025 11:18 AM)  Home Health Interventions: Called home health agency (Per [facility], unable to reach pt. Referral  closed. Pt will need to see his PCP for another referral) (6/19/2025 11:18 AM)    Patient Teaching  What is the patient's perception of their health status since discharge?: Same (6/19/2025 11:18 AM)  Is the patient/caregiver able to teach back the hierarchy of who to call/visit for symptoms/problems? PCP, Specialist, Home Health nurse, Urgent Care, ED, 911: Yes (6/19/2025 11:18 AM)

## 2025-06-20 ENCOUNTER — PATIENT OUTREACH (OUTPATIENT)
Dept: PRIMARY CARE | Facility: CLINIC | Age: 65
End: 2025-06-20
Payer: COMMERCIAL

## 2025-06-20 DIAGNOSIS — L60.9 NAIL PROBLEM: Primary | ICD-10-CM

## 2025-06-20 NOTE — PROGRESS NOTES
Attempted to reach patient with follow up to last outreach. No answer, left detailed message. Instructed him to call the office directly with any questions or needs as this RN CM will be out of the office until 6/30/25.    [facility]: Referral was closed, no contact. Ensured they had correct contact number for patient to follow up.    [employer]: Called to ask about portable O2 as pt says one tank is not enough to get him back and forth to [place]. Was advised by  that pt has a portable O2 concentrator. Advised pt that he should be using the portable concentrator as it is safer than transporting compressed gas in the car and it will provide the O2 support he needs while out for longer rides.     Oxy IR: Per Dr. Tucker, he would recommended pain management. Advised pt to speak with his cancer care team about palliative medicine as they can also assist with this.    Podiatry: Advised pt that the referral is in and waiting for Dr. Tucker approval.    Reminded him of upcoming appointment with Dr. Tucker on 6/26/25.

## 2025-06-26 ENCOUNTER — APPOINTMENT (OUTPATIENT)
Dept: PRIMARY CARE | Facility: CLINIC | Age: 65
End: 2025-06-26
Payer: COMMERCIAL

## 2025-06-26 VITALS
RESPIRATION RATE: 19 BRPM | WEIGHT: 307 LBS | HEART RATE: 101 BPM | HEIGHT: 66 IN | SYSTOLIC BLOOD PRESSURE: 110 MMHG | BODY MASS INDEX: 49.34 KG/M2 | TEMPERATURE: 98 F | OXYGEN SATURATION: 90 % | DIASTOLIC BLOOD PRESSURE: 58 MMHG

## 2025-06-26 DIAGNOSIS — E11.9 TYPE 2 DIABETES MELLITUS WITHOUT COMPLICATION, WITHOUT LONG-TERM CURRENT USE OF INSULIN: ICD-10-CM

## 2025-06-26 DIAGNOSIS — J43.2 CENTRILOBULAR EMPHYSEMA (MULTI): ICD-10-CM

## 2025-06-26 DIAGNOSIS — D61.818 OTHER PANCYTOPENIA (MULTI): ICD-10-CM

## 2025-06-26 DIAGNOSIS — G89.4 CHRONIC PAIN SYNDROME: ICD-10-CM

## 2025-06-26 DIAGNOSIS — F17.211 CIGARETTE NICOTINE DEPENDENCE IN REMISSION: ICD-10-CM

## 2025-06-26 DIAGNOSIS — F11.90 OPIOID USE: ICD-10-CM

## 2025-06-26 DIAGNOSIS — D47.1 PRIMARY MYELOFIBROSIS (MULTI): Primary | ICD-10-CM

## 2025-06-26 DIAGNOSIS — R91.1 RIGHT UPPER LOBE PULMONARY NODULE: ICD-10-CM

## 2025-06-26 DIAGNOSIS — I10 PRIMARY HYPERTENSION: ICD-10-CM

## 2025-06-26 LAB
POC FINGERSTICK BLOOD GLUCOSE: 134 MG/DL (ref 70–100)
POC HEMOGLOBIN A1C: 6.7 % (ref 4.2–6.5)

## 2025-06-26 PROCEDURE — 83036 HEMOGLOBIN GLYCOSYLATED A1C: CPT | Performed by: FAMILY MEDICINE

## 2025-06-26 PROCEDURE — 82962 GLUCOSE BLOOD TEST: CPT | Performed by: FAMILY MEDICINE

## 2025-06-26 PROCEDURE — 99495 TRANSJ CARE MGMT MOD F2F 14D: CPT | Performed by: FAMILY MEDICINE

## 2025-06-26 RX ORDER — LANCETS
EACH MISCELLANEOUS
Qty: 100 EACH | Refills: 3 | Status: SHIPPED | OUTPATIENT
Start: 2025-06-26

## 2025-06-26 RX ORDER — PANTOPRAZOLE SODIUM 40 MG/1
40 TABLET, DELAYED RELEASE ORAL
COMMUNITY
Start: 2025-06-10 | End: 2025-07-20

## 2025-06-26 RX ORDER — NALOXONE HYDROCHLORIDE 4 MG/.1ML
4 SPRAY NASAL AS NEEDED
Qty: 2 EACH | Refills: 0 | Status: SHIPPED | OUTPATIENT
Start: 2025-06-26

## 2025-06-26 RX ORDER — OXYCODONE HYDROCHLORIDE 10 MG/1
10 TABLET ORAL EVERY 8 HOURS PRN
Qty: 90 TABLET | Refills: 0 | Status: SHIPPED | OUTPATIENT
Start: 2025-06-26

## 2025-06-26 RX ORDER — BUDESONIDE, GLYCOPYRROLATE, AND FORMOTEROL FUMARATE 160; 9; 4.8 UG/1; UG/1; UG/1
2 AEROSOL, METERED RESPIRATORY (INHALATION)
Qty: 10.7 G | Refills: 2 | Status: SHIPPED | OUTPATIENT
Start: 2025-06-26

## 2025-06-26 RX ORDER — GABAPENTIN 100 MG/1
100-200 CAPSULE ORAL 3 TIMES DAILY
Qty: 180 CAPSULE | Refills: 1 | Status: SHIPPED | OUTPATIENT
Start: 2025-06-26 | End: 2025-12-23

## 2025-06-26 RX ORDER — LEVOFLOXACIN 750 MG/1
750 TABLET, FILM COATED ORAL
COMMUNITY
Start: 2025-06-07

## 2025-06-26 RX ORDER — METRONIDAZOLE 500 MG/1
500 TABLET ORAL 3 TIMES DAILY
COMMUNITY
Start: 2025-06-07 | End: 2025-07-07

## 2025-06-26 RX ORDER — ALBUTEROL SULFATE 90 UG/1
2 INHALANT RESPIRATORY (INHALATION) EVERY 4 HOURS PRN
Qty: 8.5 G | Refills: 1 | Status: SHIPPED | OUTPATIENT
Start: 2025-06-26 | End: 2026-06-26

## 2025-06-26 RX ORDER — INSULIN PUMP SYRINGE, 3 ML
EACH MISCELLANEOUS
Qty: 1 KIT | Refills: 0 | Status: SHIPPED | OUTPATIENT
Start: 2025-06-26

## 2025-06-26 ASSESSMENT — PATIENT HEALTH QUESTIONNAIRE - PHQ9
2. FEELING DOWN, DEPRESSED OR HOPELESS: SEVERAL DAYS
SUM OF ALL RESPONSES TO PHQ9 QUESTIONS 1 AND 2: 1
1. LITTLE INTEREST OR PLEASURE IN DOING THINGS: NOT AT ALL

## 2025-06-26 ASSESSMENT — ENCOUNTER SYMPTOMS
DEPRESSION: 1
LOSS OF SENSATION IN FEET: 0
OCCASIONAL FEELINGS OF UNSTEADINESS: 0

## 2025-06-26 NOTE — PATIENT INSTRUCTIONS
Follow up in 6 weeks    Continue current medications and therapy for chronic medical conditions.    Patient was advised importance of proper diet/nutrition in addition adequate hydration. Patient was encouraged moderate exercise program to include 30 minutes daily for 5 days of the week or 150 minutes weekly. Patient will follow-up with us as scheduled.

## 2025-06-26 NOTE — PROGRESS NOTES
Subjective   Patient ID: Jaycee Harp is a 65 y.o. male who presents for Hospital Follow-up ([facility]; 06/16/2025-06/17/2025; Thrombocytope), Results (XR abdomen 06/05/2025/MR liver 06/04/2025), and Referral (Pain doctor).  History of Present Illness  The patient is a 65-year-old  male who presents for a follow-up visit after being seen at the [facility] from 06/16/2025 through 06/17/2025 for thrombocytopenia. He wishes to review the results of his abdominal MRI and MRI of the liver. He also requests a referral to pain medicine.    He reports that an abnormality was detected in his liver on 06/06/2025, which prompted a recommendation to contact his healthcare provider. An MRI of the liver showed a 2 cm enhancing lesion, likely an abscess, which was deemed too small to drain. He was prescribed an antibiotic regimen during his hospital stay, which he believes has been effective as he feels a reduction in the size of the abnormality. He has been mindful of his diet, avoiding overeating and consuming only one substantial meal per day. He has been trying to stay active by walking up and down the steps.    He experiences shortness of breath when taking gabapentin, which he finds intolerable. He was advised to take 2 or 3 doses at night, but he is unable to comply with this regimen. He has been prescribed oxycodone for pain management, but he has exhausted his supply. He is uncertain about the healthcare professional responsible for his care and has been attempting to schedule an appointment with a nurse since Monday without success. He had a scheduled appointment on 06/16/2025, but he missed it due to a court appearance. He has previously consulted with a pain specialist who prescribed his pain medication and has been trying to reach her since Monday without success. He finds oxycodone 10 mg provides relief for approximately 4 hours and takes it every 6 hours. He has expressed concerns about  acupuncture due to his bleeding tendency. His hematologist prescribed him 15 tablets of oxycodone, which lasted him for 4 days. He is currently on a regimen of gabapentin 300 mg three times daily.    He is currently taking acyclovir 400 mg twice daily. He uses an albuterol inhaler and requires a refill of Breztri, which he finds beneficial. He is also on hydrochlorothiazide, levothyroxine, metronidazole, omeprazole, pantoprazole, MiraLAX, and Senokot. He has not yet started Zofran for nausea. He reports leg swelling, which improves with losartan and hydrochlorothiazide combination therapy.    Review of Systems   Constitutional: Negative.  Negative for activity change, appetite change, fatigue and fever.   HENT:  Negative for congestion, dental problem, ear discharge, ear pain, mouth sores, rhinorrhea, sinus pressure, sinus pain, sore throat, tinnitus and trouble swallowing.    Eyes:  Negative for photophobia, pain and visual disturbance.   Respiratory:  Negative for cough, chest tightness, shortness of breath and stridor.    Cardiovascular:  Negative for chest pain and palpitations.   Gastrointestinal:  Negative for abdominal distention, abdominal pain, blood in stool, constipation, diarrhea and rectal pain.   Endocrine: Negative for cold intolerance, heat intolerance, polydipsia, polyphagia and polyuria.   Genitourinary:  Negative for dysuria, flank pain, hematuria and urgency.   Musculoskeletal:  Negative for arthralgias, gait problem, myalgias and neck stiffness.   Skin:  Negative for color change and rash.   Allergic/Immunologic: Negative for environmental allergies and food allergies.   Neurological:  Negative for dizziness, seizures, syncope, speech difficulty and headaches.   Hematological:  Negative for adenopathy.   Psychiatric/Behavioral:  Negative for agitation, confusion, decreased concentration, dysphoric mood and sleep disturbance. The patient is not nervous/anxious.    The above were reviewed and  "noted negative except as noted in HPI and Problem List.    Objective     /58 (BP Location: Right arm, Patient Position: Sitting, BP Cuff Size: Adult)   Pulse 101   Temp 36.7 °C (98 °F) (Temporal)   Resp 19   Ht 1.676 m (5' 6\")   Wt 139 kg (307 lb)   SpO2 90%   BMI 49.55 kg/m²      Physical Exam    Constitutional: Well developed, well nourished, alert and in no acute distress   Eyes: Normal external exam. Pupils equally round and reactive to light with normal accommodation and extraocular movements intact.  Neck: Supple, no lymphadenopathy or masses.   Cardiovascular: Regular rate and rhythm, normal S1 and S2, no murmurs, gallops, or rubs. Radial pulses normal. No peripheral edema.  Pulmonary: No respiratory distress, lungs clear to auscultation bilaterally. No wheezes, rhonchi, rales.  Abdomen: soft,non tender, non distended, without masses or HSM  Skin: Warm, well perfused, normal skin turgor and color.   Neurologic: Cranial nerves II-XII grossly intact.   Psychiatric: Mood calm and affect normal  Musculoskeletal: Moving all extremities without restriction  The above were reviewed and noted negative except as noted in HPI and Problem List.    Problem List Items Addressed This Visit       Cigarette nicotine dependence in remission    Right upper lobe pulmonary nodule    Primary hypertension    Primary myelofibrosis (Multi) - Primary    Type 2 diabetes mellitus without complication, without long-term current use of insulin    Relevant Medications    Blood glucose monitoring meter    blood sugar diagnostic    lancets misc    Other Relevant Orders    POCT glycosylated hemoglobin (Hb A1C) manually resulted (Completed)    POCT fingerstick glucose manually resulted (Completed)    Other pancytopenia (Multi)     Other Visit Diagnoses         Chronic pain syndrome        Relevant Medications    gabapentin (Neurontin) 100 mg capsule    oxyCODONE (Roxicodone) 10 mg immediate release tablet    Other Relevant Orders "    Referral to Pain Medicine      Centrilobular emphysema (Multi)        Relevant Medications    albuterol (ProAir HFA) 90 mcg/actuation inhaler    budesonide-glycopyr-formoterol (Breztri Aerosphere) 160-9-4.8 mcg/actuation HFA aerosol inhaler      Opioid use        Relevant Medications    naloxone (Narcan) 4 mg/0.1 mL nasal spray             Assessment & Plan  1. Liver abscess.  - MRI of the liver showed a 2 cm enhancing lesion consistent with an abscess.  - The lesion is too small to drain.  - A repeat scan or ultrasound is recommended.  - Antibiotic treatment was initiated during the hospital stay.    2. Pain management.  - Reports difficulty breathing and confusion with the current gabapentin dosage of 300 mg.  - Gabapentin dosage will be reduced to 100 mg, to be taken 1-2 tablets three times daily.  - Prescription for oxycodone 10 mg will be provided, to be taken every 6 hours as needed for pain.  - Follow-up with pain management specialist is advised.    3. Medication management.  - Continues taking acyclovir 400 mg twice daily.  - Refill for the Breztri inhaler will be provided.  - Continues taking losartan-hydrochlorothiazide 50-12.5 mg combination for blood pressure management.  - Discontinue omeprazole and continue with pantoprazole for stomach issues.    4. Diabetes management.  - A1c level is 6.7, which is within the target range.  - A glucometer will be provided to monitor blood sugar levels.  - Dietary counseling provided to minimize sugars and carbohydrates.    Follow-up  - The patient will follow up in 6 weeks.    Results  Labs   - A1c: 6.7    Imaging   - MRI of the liver: 2 cm enhancing lesion consistent with an abscess       Mandeep Tucker,      This medical note was created with the assistance of artificial intelligence (AI) for documentation purposes. The content has been reviewed and confirmed by the healthcare provider for accuracy and completeness. Patient consented to the use of audio  recording and use of AI during their visit.

## 2025-06-30 ENCOUNTER — PATIENT OUTREACH (OUTPATIENT)
Dept: PRIMARY CARE | Facility: CLINIC | Age: 65
End: 2025-06-30
Payer: COMMERCIAL

## 2025-06-30 DIAGNOSIS — D46.9 MYELODYSPLASTIC SYNDROME, UNSPECIFIED (MULTI): ICD-10-CM

## 2025-06-30 DIAGNOSIS — D69.6 THROMBOCYTOPENIA: ICD-10-CM

## 2025-07-02 ENCOUNTER — PATIENT OUTREACH (OUTPATIENT)
Dept: PRIMARY CARE | Facility: CLINIC | Age: 65
End: 2025-07-02
Payer: COMMERCIAL

## 2025-07-02 DIAGNOSIS — D46.9 MYELODYSPLASTIC SYNDROME, UNSPECIFIED (MULTI): ICD-10-CM

## 2025-07-02 NOTE — PROGRESS NOTES
Phone call to [facility] @ [phone], spoke with [place].   Discussed battery life of pt's portable O2 concentrator. The battery isn't lasting more than an hour and pt is having difficulty getting back and forth to appointments. Per Israel, due to pt's O2 demand, the battery isn't expected to last more than an hour. Additional batteries may be purchased but they are approximately $250 each and would last about the same amount of time. Israel discussed changing to portable O2 tanks which would supply pt for a longer period of time and can be changed out by them when empty. Israel informed this RN of three different sizes, up to an E-tank that would last approximately 7 hours.     Phone call to patient. Updated him on this information. Per pt, he is going to continue working with the portable concentrator as he feels it is safer to travel with. Pt says he will use the car  to keep it plugged in while driving and take the wall  so he can plug it in when he gets to his destination. Pt says he will alert office staff of his O2 needs so he may utilize their O2 if available during his visit while his concentrator is charging. Advised pt that if at any time he feels the portable concentrator is not working well for him, he can contact [facility] to make the change to tanks. Pt expressed understanding.

## 2025-07-11 ENCOUNTER — PATIENT OUTREACH (OUTPATIENT)
Dept: PRIMARY CARE | Facility: CLINIC | Age: 65
End: 2025-07-11
Payer: COMMERCIAL

## 2025-07-11 DIAGNOSIS — D46.9 MYELODYSPLASTIC SYNDROME, UNSPECIFIED (MULTI): ICD-10-CM

## 2025-07-11 DIAGNOSIS — K75.0 LIVER ABSCESS (HHS-HCC): ICD-10-CM

## 2025-07-11 DIAGNOSIS — D69.6 THROMBOCYTOPENIA: ICD-10-CM

## 2025-07-11 NOTE — PROGRESS NOTES
Initial outreach for hospital follow up. No answer, left message requesting call back and provided direct contact number.

## 2025-07-11 NOTE — PROGRESS NOTES
Discharge facility: [facility]  Discharge diagnosis: Thrombocytopenia, Liver Abscess  Admission date: 07/05/25  Discharge date: 07/10/25    Discharge facility: [facility]  Discharge diagnosis: Thrombocytopenia, Liver Abscess  Admission date: 06/30/25  Discharge date: 07/05/25 - Transferred to [facility]    PCP Appointment Date: N/A  Specialist Appointment Date: 07/15/25 - Hematology/Oncology      07/16/25 - ID follow up on liver biopsy results    Hospital Encounter and Summary: Linked     7/5/2025 - Hospital Encounter with CATIA CHAUDHARI; HILARIO FORBES; RYLEY MOORE; Hilario Forbes; Catia Chaudhari MD in [place] [address]       6/30/2025 - Hospital Encounter with Freddy Hines MD; Ayaz Molina MD; Claudia Martin MD; Marlena Diaz MD; Ryley Moore MD in [address]     2 attempts were made to reach patient to assess needs. No return call as of this note. If patient schedules follow-up visit within 14 days of discharge, visit is TCM billable. Message sent to PRACTICE STAFF to reach out to patient and schedule an appointment within 7-13 days from discharge date.    If patient meets criteria for moderately complex & has follow-up within 14 days - can bill [phone] for hospital follow-up.  If patient meets criteria for high complex & has follow-up visit within 7 days - can bill [phone] for hospital follow-up

## 2025-07-29 ENCOUNTER — PATIENT OUTREACH (OUTPATIENT)
Dept: PRIMARY CARE | Facility: CLINIC | Age: 65
End: 2025-07-29
Payer: COMMERCIAL

## 2025-07-29 ENCOUNTER — TELEPHONE (OUTPATIENT)
Dept: PAIN MEDICINE | Facility: CLINIC | Age: 65
End: 2025-07-29
Payer: COMMERCIAL

## 2025-07-29 DIAGNOSIS — D69.6 LOW PLATELET COUNT: ICD-10-CM

## 2025-07-29 DIAGNOSIS — S36.112A LIVER HEMATOMA, INITIAL ENCOUNTER: ICD-10-CM

## 2025-07-29 RX ORDER — CEDAZURIDINE AND DECITABINE 100; 35 MG/1; MG/1
1 TABLET, FILM COATED ORAL WEEKLY
COMMUNITY

## 2025-07-29 RX ORDER — TAMSULOSIN HYDROCHLORIDE 0.4 MG/1
0.4 CAPSULE ORAL DAILY
COMMUNITY

## 2025-07-29 RX ORDER — SUCRALFATE 1 G/1
1 TABLET ORAL
COMMUNITY

## 2025-07-29 RX ORDER — MICONAZOLE NITRATE 2 G/100G
1 POWDER TOPICAL 2 TIMES DAILY PRN
COMMUNITY

## 2025-07-29 RX ORDER — MOMETASONE FUROATE 220 UG/1
1 INHALANT RESPIRATORY (INHALATION)
COMMUNITY

## 2025-07-29 RX ORDER — PANTOPRAZOLE SODIUM 40 MG/1
40 TABLET, DELAYED RELEASE ORAL 2 TIMES DAILY
COMMUNITY

## 2025-07-29 NOTE — PROGRESS NOTES
Discharge facility: [facility]  Discharge diagnosis: Low platelet count, Hematoma of the liver  Admission date: 7/17/25  Discharge date: 7/28/25    PCP Appointment Date: 7/31/25  Specialist Appointment Date: 8/1/25 Palliative Medicine  Hospital Encounter and Summary: Linked     Hospital Encounter with Geoffrey Welsh DO; Arielle Wilson MD; Lupe Lundberg MD; Patsy Robertson DO; Kiran So MD; Max Ambrose MD       Hospital Encounter with Cristiano Strong MD; Scot Alves MD; Lula Azul MD; Balwinder Menard MD; BALWINDER MENARD; SCOT ALVES; ARIELLE WILSON      See Discharge assessment below for further details     Wrap Up  Is the patient/caregiver familiar with Advance Care Planning?: Yes (7/29/2025 10:51 AM)  Would the patient like more information on Advance Care Planning?: No (7/29/2025 10:51 AM)  Call End Time: 1117 (7/29/2025 10:51 AM)    Engagement  Call Start Time: 1043 (7/29/2025 10:51 AM)    Medications  Medications reviewed with patient/caregiver?: Yes (7/29/2025 10:51 AM)  Is the patient having any side effects they believe may be caused by any medication additions or changes?: (!) Yes (Gabapentin caused hallucinations) (7/29/2025 10:51 AM)  Does the patient have all medications ordered at discharge?: Yes (7/29/2025 10:51 AM)  Prescription Comments: Gabapentin & Hydrochlorothiazide discontinued (7/29/2025 10:51 AM)  Is the patient taking all medications as directed (includes completed medication regime)?: Yes (7/29/2025 10:51 AM)  Medication Comments: Pt picking up pantoprazole today. Oxy IR received from [facility] at discharge and palliative medicine with [facility] will continue to manage. (7/29/2025 10:51 AM)    Appointments  Does the patient have a primary care provider?: Yes (7/29/2025 10:51 AM)  Care Management Interventions: Verified appointment date/time/provider (7/29/2025 10:51 AM)  Has the patient kept scheduled appointments due by today?: No (wendy frequent hospitalizations)  (7/29/2025 10:51 AM)  What is preventing the patient from keeping their appointments?: Transportation (7/29/2025 10:51 AM)  Care Management Interventions: Educated on importance of keeping appointment (7/29/2025 10:51 AM)    Self Management  What is the home health agency?: None (7/29/2025 10:51 AM)  What Durable Medical Equipment (DME) was ordered?: None - Pt plans to speak with Dr. Tucker on 7/31/25 about hospital bed. (7/29/2025 10:51 AM)    Patient Teaching  Does the patient have access to their discharge instructions?: Yes (7/29/2025 10:51 AM)  Care Management Interventions: Reviewed instructions with patient (7/29/2025 10:51 AM)  What is the patient's perception of their health status since discharge?: Improving (7/29/2025 10:51 AM)  Is the patient/caregiver able to teach back the hierarchy of who to call/visit for symptoms/problems? PCP, Specialist, Home Health nurse, Urgent Care, ED, 911: Yes (7/29/2025 10:51 AM)

## 2025-07-30 DIAGNOSIS — F32.9 REACTIVE DEPRESSION: Primary | ICD-10-CM

## 2025-07-31 ENCOUNTER — APPOINTMENT (OUTPATIENT)
Dept: PRIMARY CARE | Facility: CLINIC | Age: 65
End: 2025-07-31
Payer: COMMERCIAL

## 2025-07-31 DIAGNOSIS — F19.10 POLYSUBSTANCE ABUSE: Primary | ICD-10-CM

## 2025-08-05 ENCOUNTER — TELEPHONE (OUTPATIENT)
Dept: PRIMARY CARE | Facility: CLINIC | Age: 65
End: 2025-08-05
Payer: COMMERCIAL

## 2025-08-05 DIAGNOSIS — E11.9 TYPE 2 DIABETES MELLITUS WITHOUT COMPLICATION, WITHOUT LONG-TERM CURRENT USE OF INSULIN: Primary | ICD-10-CM

## 2025-08-11 ENCOUNTER — PATIENT OUTREACH (OUTPATIENT)
Dept: PRIMARY CARE | Facility: CLINIC | Age: 65
End: 2025-08-11
Payer: COMMERCIAL

## 2025-08-11 DIAGNOSIS — S36.112A LIVER HEMATOMA, INITIAL ENCOUNTER: ICD-10-CM

## 2025-08-12 ENCOUNTER — TELEPHONE (OUTPATIENT)
Dept: PAIN MEDICINE | Facility: CLINIC | Age: 65
End: 2025-08-12
Payer: COMMERCIAL

## 2025-08-16 RX ORDER — LANCETS 26 GAUGE
EACH MISCELLANEOUS
Qty: 1 EACH | Refills: 1 | Status: SHIPPED | OUTPATIENT
Start: 2025-08-16 | End: 2026-08-14

## 2025-08-25 ENCOUNTER — PATIENT OUTREACH (OUTPATIENT)
Dept: PRIMARY CARE | Facility: CLINIC | Age: 65
End: 2025-08-25
Payer: COMMERCIAL

## 2025-08-25 DIAGNOSIS — D69.6 LOW PLATELET COUNT: ICD-10-CM

## 2025-08-25 DIAGNOSIS — E11.9 TYPE 2 DIABETES MELLITUS WITHOUT COMPLICATION, WITHOUT LONG-TERM CURRENT USE OF INSULIN: ICD-10-CM

## 2025-08-25 DIAGNOSIS — D47.1 PRIMARY MYELOFIBROSIS (MULTI): ICD-10-CM

## 2025-08-25 DIAGNOSIS — D46.9 MYELODYSPLASTIC SYNDROME, UNSPECIFIED (MULTI): ICD-10-CM

## 2025-08-25 DIAGNOSIS — G89.4 CHRONIC PAIN SYNDROME: ICD-10-CM

## 2025-09-06 DIAGNOSIS — I10 PRIMARY HYPERTENSION: ICD-10-CM

## 2025-09-06 RX ORDER — LOSARTAN POTASSIUM AND HYDROCHLOROTHIAZIDE 12.5; 5 MG/1; MG/1
1 TABLET ORAL DAILY
Qty: 90 TABLET | Refills: 0 | Status: SHIPPED | OUTPATIENT
Start: 2025-09-06